# Patient Record
Sex: MALE | Race: BLACK OR AFRICAN AMERICAN | Employment: FULL TIME | URBAN - NONMETROPOLITAN AREA
[De-identification: names, ages, dates, MRNs, and addresses within clinical notes are randomized per-mention and may not be internally consistent; named-entity substitution may affect disease eponyms.]

---

## 2017-05-06 ENCOUNTER — OFFICE VISIT (OUTPATIENT)
Dept: URGENT CARE | Age: 37
End: 2017-05-06
Payer: COMMERCIAL

## 2017-05-06 VITALS
WEIGHT: 315 LBS | RESPIRATION RATE: 22 BRPM | SYSTOLIC BLOOD PRESSURE: 166 MMHG | TEMPERATURE: 98 F | DIASTOLIC BLOOD PRESSURE: 96 MMHG | OXYGEN SATURATION: 97 % | BODY MASS INDEX: 37.19 KG/M2 | HEIGHT: 77 IN | HEART RATE: 109 BPM

## 2017-05-06 DIAGNOSIS — I10 ESSENTIAL HYPERTENSION: ICD-10-CM

## 2017-05-06 DIAGNOSIS — J06.9 URI WITH COUGH AND CONGESTION: Primary | ICD-10-CM

## 2017-05-06 PROCEDURE — 99213 OFFICE O/P EST LOW 20 MIN: CPT | Performed by: PHYSICIAN ASSISTANT

## 2017-05-06 RX ORDER — BENZONATATE 100 MG/1
100 CAPSULE ORAL 3 TIMES DAILY PRN
Qty: 30 CAPSULE | Refills: 0 | Status: SHIPPED | OUTPATIENT
Start: 2017-05-06 | End: 2017-05-13

## 2017-05-06 RX ORDER — LOSARTAN POTASSIUM AND HYDROCHLOROTHIAZIDE 12.5; 5 MG/1; MG/1
1 TABLET ORAL DAILY
Qty: 30 TABLET | Refills: 0 | Status: SHIPPED | OUTPATIENT
Start: 2017-05-06 | End: 2022-10-28

## 2017-05-06 RX ORDER — AZITHROMYCIN 250 MG/1
TABLET, FILM COATED ORAL
Qty: 1 PACKET | Refills: 0 | Status: SHIPPED | OUTPATIENT
Start: 2017-05-06 | End: 2017-05-16

## 2017-05-06 ASSESSMENT — ENCOUNTER SYMPTOMS
GASTROINTESTINAL NEGATIVE: 1
WHEEZING: 0
SORE THROAT: 1
COUGH: 1
SHORTNESS OF BREATH: 0

## 2017-05-16 ENCOUNTER — OFFICE VISIT (OUTPATIENT)
Dept: FAMILY MEDICINE CLINIC | Facility: CLINIC | Age: 37
End: 2017-05-16

## 2017-05-16 VITALS
RESPIRATION RATE: 18 BRPM | OXYGEN SATURATION: 98 % | WEIGHT: 315 LBS | SYSTOLIC BLOOD PRESSURE: 142 MMHG | HEIGHT: 77 IN | DIASTOLIC BLOOD PRESSURE: 96 MMHG | BODY MASS INDEX: 37.19 KG/M2 | HEART RATE: 103 BPM | TEMPERATURE: 98.8 F

## 2017-05-16 DIAGNOSIS — I10 BENIGN ESSENTIAL HTN: Primary | ICD-10-CM

## 2017-05-16 DIAGNOSIS — E66.01 MORBID OBESITY DUE TO EXCESS CALORIES (HCC): ICD-10-CM

## 2017-05-16 DIAGNOSIS — Z13.220 SCREENING FOR LIPID DISORDERS: ICD-10-CM

## 2017-05-16 PROBLEM — E66.09 NON MORBID OBESITY DUE TO EXCESS CALORIES: Status: ACTIVE | Noted: 2017-05-16

## 2017-05-16 PROCEDURE — 99214 OFFICE O/P EST MOD 30 MIN: CPT | Performed by: FAMILY MEDICINE

## 2017-05-16 RX ORDER — LOSARTAN POTASSIUM AND HYDROCHLOROTHIAZIDE 12.5; 5 MG/1; MG/1
1.5 TABLET ORAL DAILY
Qty: 45 TABLET | Refills: 2 | Status: SHIPPED | OUTPATIENT
Start: 2017-05-16 | End: 2017-06-19

## 2017-05-16 RX ORDER — BENZONATATE 100 MG/1
CAPSULE ORAL 3 TIMES DAILY PRN
Refills: 0 | COMMUNITY
Start: 2017-05-06 | End: 2017-05-16

## 2017-05-16 RX ORDER — LOSARTAN POTASSIUM AND HYDROCHLOROTHIAZIDE 12.5; 5 MG/1; MG/1
TABLET ORAL DAILY
Refills: 0 | COMMUNITY
Start: 2017-05-06 | End: 2017-05-16 | Stop reason: SDUPTHER

## 2017-05-17 LAB
ALBUMIN SERPL-MCNC: 4 G/DL (ref 3.5–5)
ALBUMIN/GLOB SERPL: 1 G/DL (ref 1.1–2.5)
ALP SERPL-CCNC: 105 U/L (ref 24–120)
ALT SERPL-CCNC: 57 U/L (ref 0–54)
AST SERPL-CCNC: 37 U/L (ref 7–45)
BASOPHILS # BLD AUTO: 0.04 10*3/MM3 (ref 0–0.2)
BASOPHILS NFR BLD AUTO: 0.3 % (ref 0–2)
BILIRUB SERPL-MCNC: 0.6 MG/DL (ref 0.1–1)
BUN SERPL-MCNC: 10 MG/DL (ref 5–21)
BUN/CREAT SERPL: 13 (ref 7–25)
CALCIUM SERPL-MCNC: 9.7 MG/DL (ref 8.4–10.4)
CHLORIDE SERPL-SCNC: 100 MMOL/L (ref 98–110)
CHOLEST SERPL-MCNC: 175 MG/DL (ref 130–200)
CO2 SERPL-SCNC: 26 MMOL/L (ref 24–31)
CREAT SERPL-MCNC: 0.77 MG/DL (ref 0.5–1.4)
EOSINOPHIL # BLD AUTO: 0.12 10*3/MM3 (ref 0–0.7)
EOSINOPHIL NFR BLD AUTO: 1 % (ref 0–4)
ERYTHROCYTE [DISTWIDTH] IN BLOOD BY AUTOMATED COUNT: 14.9 % (ref 12–15)
GLOBULIN SER CALC-MCNC: 4.2 GM/DL
GLUCOSE SERPL-MCNC: 91 MG/DL (ref 70–100)
HCT VFR BLD AUTO: 51.2 % (ref 40–52)
HDLC SERPL-MCNC: 50 MG/DL
HGB BLD-MCNC: 16.7 G/DL (ref 14–18)
IMM GRANULOCYTES # BLD: 0.03 10*3/MM3 (ref 0–0.03)
IMM GRANULOCYTES NFR BLD: 0.3 % (ref 0–5)
LDLC SERPL CALC-MCNC: 103 MG/DL (ref 0–99)
LYMPHOCYTES # BLD AUTO: 3.07 10*3/MM3 (ref 0.72–4.86)
LYMPHOCYTES NFR BLD AUTO: 25.6 % (ref 15–45)
MCH RBC QN AUTO: 28.3 PG (ref 28–32)
MCHC RBC AUTO-ENTMCNC: 32.6 G/DL (ref 33–36)
MCV RBC AUTO: 86.8 FL (ref 82–95)
MONOCYTES # BLD AUTO: 0.83 10*3/MM3 (ref 0.19–1.3)
MONOCYTES NFR BLD AUTO: 6.9 % (ref 4–12)
NEUTROPHILS # BLD AUTO: 7.9 10*3/MM3 (ref 1.87–8.4)
NEUTROPHILS NFR BLD AUTO: 65.9 % (ref 39–78)
PLATELET # BLD AUTO: 286 10*3/MM3 (ref 130–400)
POTASSIUM SERPL-SCNC: 4.1 MMOL/L (ref 3.5–5.3)
PROT SERPL-MCNC: 8.2 G/DL (ref 6.3–8.7)
RBC # BLD AUTO: 5.9 10*6/MM3 (ref 4.8–5.9)
SODIUM SERPL-SCNC: 141 MMOL/L (ref 135–145)
TRIGL SERPL-MCNC: 111 MG/DL (ref 0–149)
VLDLC SERPL CALC-MCNC: 22.2 MG/DL
WBC # BLD AUTO: 11.99 10*3/MM3 (ref 4.8–10.8)

## 2017-06-19 ENCOUNTER — OFFICE VISIT (OUTPATIENT)
Dept: FAMILY MEDICINE CLINIC | Facility: CLINIC | Age: 37
End: 2017-06-19

## 2017-06-19 VITALS
BODY MASS INDEX: 37.19 KG/M2 | OXYGEN SATURATION: 98 % | HEIGHT: 77 IN | DIASTOLIC BLOOD PRESSURE: 96 MMHG | TEMPERATURE: 97.8 F | SYSTOLIC BLOOD PRESSURE: 146 MMHG | RESPIRATION RATE: 20 BRPM | HEART RATE: 93 BPM | WEIGHT: 315 LBS

## 2017-06-19 DIAGNOSIS — E66.01 MORBID OBESITY DUE TO EXCESS CALORIES (HCC): Primary | ICD-10-CM

## 2017-06-19 DIAGNOSIS — I10 BENIGN ESSENTIAL HTN: ICD-10-CM

## 2017-06-19 PROCEDURE — 99214 OFFICE O/P EST MOD 30 MIN: CPT | Performed by: FAMILY MEDICINE

## 2017-06-19 RX ORDER — LOSARTAN POTASSIUM AND HYDROCHLOROTHIAZIDE 25; 100 MG/1; MG/1
1 TABLET ORAL DAILY
Qty: 30 TABLET | Refills: 2 | Status: ON HOLD | OUTPATIENT
Start: 2017-06-19 | End: 2019-10-14 | Stop reason: SDUPTHER

## 2017-06-19 NOTE — PROGRESS NOTES
Chief Complaint   Patient presents with   • Follow-up     1 month f/u of HTN and starting BP meds.  He states that he has done a lot changing of his lifestyle with trying to exercise and eat better.          History:  Marlon Mccarthy is a 37 y.o. male presents who today for evaluation of the above problems.  PCP currently listed as No Known Provider.   Present with wife, Nahomi.  Permission to speak freely discussed and patient agreed.  1 month f/u HTN.  BP is mildly elevated today, about what it was in last OV.  He has no cuff at home large enough to check it.  The patient has not been checking BP. No HA, no chest pain, no vision changes, no SOA.  Meds are doing well, no SE.  He is happy with Rx, not overly expensive.  He is down 15 lb from last OV.  He has been drinking more water, trying to drink 64-70. Drinking 12 oz before meal and waiting 30 minute.  Caffeine free, zero calorie soft drinks, water with additives.  Working out/exerFamilinking.  He is subscribed to [employer] and healthy snacking.  Caloric intake has dropped from ~4000 to 2500 to 2600.  He is budgeted for 3400. He pushing himself.  Breakfast 300-500, lunch 500-800, Dinner 600-800.  He snacks about 4-5x daily.  Pouches of tuna are working well.  He has some white coat HTN.  BMI down from 53.4 to 51.6.   We talked about incresaing BP medication from 1.5 daily to 100/25 daily and his wife is concerned that it may be too much.  He hsa no home readings.  Using Lose it and doing very well.  He is working on challenges.       Marlon Mccarthy  has a past medical history of Diverticulitis of colon; Hypertension; and Non morbid obesity due to excess calories (5/16/2017).    Allergies   Allergen Reactions   • Lisinopril Cough     Past Medical History:   Diagnosis Date   • Diverticulitis of colon    • Hypertension    • Non morbid obesity due to excess calories 5/16/2017     History reviewed. No pertinent surgical history.  Family History   Problem Relation Age of  Onset   • Hypertension Mother    • Hypertension Father    • No Known Problems Sister    • No Known Problems Maternal Grandmother    • No Known Problems Maternal Grandfather    • No Known Problems Paternal Grandmother    • No Known Problems Paternal Grandfather    • No Known Problems Sister        Current Outpatient Prescriptions on File Prior to Visit   Medication Sig Dispense Refill   • losartan-hydrochlorothiazide (HYZAAR) 50-12.5 MG per tablet Take 1.5 tablets by mouth Daily. 45 tablet 2     No current facility-administered medications on file prior to visit.        Family history, surgical history, past medical history, Allergies and meds reviewed with patient today and updated in [facility] EMR.     ROS:  Review of Systems   Constitutional: Negative for activity change, appetite change, chills, diaphoresis, fatigue and fever.   HENT: Negative for congestion, ear discharge, ear pain, facial swelling, hearing loss, rhinorrhea, sinus pressure, sneezing, sore throat and tinnitus.    Eyes: Negative for pain, discharge, redness and visual disturbance.   Respiratory: Negative for apnea, cough, choking, chest tightness, shortness of breath and wheezing.    Cardiovascular: Negative for chest pain, palpitations and leg swelling.   Gastrointestinal: Negative for abdominal pain, constipation, diarrhea, nausea and vomiting.   Genitourinary: Negative for difficulty urinating, flank pain, frequency, penile pain and urgency.   Musculoskeletal: Negative for arthralgias, back pain, gait problem, joint swelling, myalgias and neck pain.   Skin: Negative for color change, pallor and rash.   Allergic/Immunologic: Negative for environmental allergies and food allergies.   Neurological: Negative for dizziness, seizures, weakness, numbness and headaches.   Hematological: Negative for adenopathy. Does not bruise/bleed easily.   Psychiatric/Behavioral: Negative for agitation, behavioral problems, confusion, hallucinations, self-injury, sleep  "disturbance and suicidal ideas.       OBJECTIVE:  Vitals:    06/19/17 0909   BP: 142/84   BP Location: Left arm   Patient Position: Sitting   Cuff Size: Large Adult   Pulse: 93   Resp: 20   Temp: 97.8 °F (36.6 °C)   TempSrc: Oral   SpO2: 98%   Weight: (!) 435 lb 6.4 oz (197 kg)   Height: 77\" (195.6 cm)     Physical Exam   Constitutional: He is oriented to person, place, and time. He appears well-developed and well-nourished.  Non-toxic appearance. He does not have a sickly appearance.   Central adiposity. Morbidly obese.    HENT:   Head: Normocephalic and atraumatic.   Right Ear: External ear normal.   Left Ear: External ear normal.   Nose: Nose normal.   Mouth/Throat: Oropharynx is clear and moist.   Eyes: Conjunctivae and EOM are normal. Pupils are equal, round, and reactive to light.   Neck: Normal range of motion. Neck supple. No thyromegaly present.   Cardiovascular: Normal rate, regular rhythm, normal heart sounds and intact distal pulses.    No murmur heard.  Pulmonary/Chest: Effort normal and breath sounds normal. No respiratory distress. He has no wheezes. He exhibits no tenderness.   Abdominal: Soft. Bowel sounds are normal. There is no tenderness. There is no rebound and no guarding.   Musculoskeletal: Normal range of motion. He exhibits no tenderness.   Lymphadenopathy:     He has no cervical adenopathy.   Neurological: He is alert and oriented to person, place, and time. No cranial nerve deficit. Coordination normal.   Skin: Skin is warm and dry. No rash noted.   Psychiatric: He has a normal mood and affect. His behavior is normal. Judgment and thought content normal.   Nursing note and vitals reviewed.      Assessment/Plan:  Morbid obesity due to excess calories: Declined referral.  He is wanting to work on this himself.  Down 14 lb from last OV and nearly back to weight from 8/30/16.  Caloric limiting, regular exercise and goals are being met.  Initial goal of 5% weight loss.  Set small goals. Federal " guidelines recommend that people under the age of 65 should have a BMI of 18.5-24.9 and people age 65 and older should have a BMI of 23-30. Morbid obesity is defined as >100 lb overweight or BMI >40.  The patient BMI is outside the recommended range and we recommended/discussed today to utilize a diet/exercise program to get back into the appropriate range.  Today we encouraged roughly a 1 lb per week weight loss with initial goal of 5% weight loss. The initial step is to document everything that is consumed into a food diary.  Studies have shown that patients can lose up to 2x the weight by keeping track of foods.  We discussed BEE today and discussed reasonable goal to realize weight loss.    · Discussed if eating out for a meal, consider cutting food in half and placing into to-go container.  Individually portion any foods coming into the home based on package.   · Consider using smaller plates.    · Consider drinking 12 oz of water 30 minutes before meal as way to suppress appetite.   · Cut back on soft drinks/juices.  Discussed 1 can of regular soft drink has roughly 150-170 Calories per day.    · Increase exercise as able. It is recommended to try to exercise minimum 10 minutes 5 days per week.  The goal over the next 2-4 weeks is to walk 30 minutes per day 5 days per week at pace difficult to hold conversation.   · Medications that may provide some benefit to weight loss include metformin, topamax, phentermine, Qsymia, Belviq (lorcaserin), Contrave (Buproprion/naltrexone) and a few others for Binge eating.  Also discussed some of the FAD diets and recommended general portion reduction instead of special dietary changes.  · Apps that may be of benefit include Ziploop Pal, Lose it.  · Healthier choices included fruits/grains/nuts instead of process food.  · Offered referral to dietician and bariatrics.      Benign essential HTN: Not at goal.  We increased him from 50/12.5 #1.5 tablets daily to 100-25mg 1  tablet daily.  BP medications increased today.  Discussed weight loss, portion contorl. Discussed medications R/B/A to meds d/w patient, SE reviewed, handout offered regarding medications listed.    NEW RX STRENGTH TODAY.  No SE so far.  He is feeling better overall.  No chest pain, no N/V/D vision change.  Bring BP log to me in 2-4 weeks. IF this remains elevated I will add norvasc.   -     losartan-hydrochlorothiazide (HYZAAR) 100-25 MG per tablet; Take 1 tablet by mouth Daily.    Risks/benefits of current and new medications discussed with the patient and or family today.  The patient/family are aware and accept that if there any side effects they should call or return to clinic as soon as possible.  Appropriate F/U discussed for topics addressed today. All questions were answered to the satisfactory state of patient/family.  Should symptoms fail to improve or worsen they agree to call or return to clinic or to go to the ER. Education handouts were offered on any new Rx if requested.  Discussed the importance of following up with any needed screening tests/labs/specialist appointments and any requested follow-up recommended by me today.  Importance of maintaining follow-up discussed and patient accepts that missed appointments can delay diagnosis and potentially lead to worsening of conditions.    An After Visit Summary was printed and given to the patient at discharge.  Follow-up: Return in about 3 months (around 9/19/2017).         Chris Denney M.D. 6/19/2017   Answers for HPI/ROS submitted by the patient on 6/16/2017   Hypertension  Chronicity: recurrent  Onset: more than 1 year ago  Progression since onset: waxing and waning  Condition status: controlled  anxiety: No  blurred vision: No  malaise/fatigue: No  orthopnea: No  peripheral edema: No  PND: No  sweats: No  Agents associated with hypertension: no associated agents  CAD risks: obesity  Compliance problems: exercise

## 2017-08-01 ENCOUNTER — HOSPITAL ENCOUNTER (OUTPATIENT)
Dept: NUTRITION | Facility: HOSPITAL | Age: 37
Discharge: HOME | End: 2017-08-01
Attending: FAMILY MEDICINE

## 2019-10-14 ENCOUNTER — APPOINTMENT (OUTPATIENT)
Dept: CARDIOLOGY | Facility: HOSPITAL | Age: 39
End: 2019-10-14

## 2019-10-14 ENCOUNTER — HOSPITAL ENCOUNTER (OUTPATIENT)
Facility: HOSPITAL | Age: 39
Setting detail: OBSERVATION
Discharge: HOME | End: 2019-10-14
Attending: EMERGENCY MEDICINE | Admitting: FAMILY MEDICINE

## 2019-10-14 VITALS
TEMPERATURE: 97.6 F | BODY MASS INDEX: 38.36 KG/M2 | SYSTOLIC BLOOD PRESSURE: 173 MMHG | WEIGHT: 315 LBS | RESPIRATION RATE: 16 BRPM | OXYGEN SATURATION: 99 % | HEART RATE: 87 BPM | HEIGHT: 76 IN | DIASTOLIC BLOOD PRESSURE: 84 MMHG

## 2019-10-14 DIAGNOSIS — I10 BENIGN ESSENTIAL HTN: ICD-10-CM

## 2019-10-14 DIAGNOSIS — F10.20 ALCOHOLISM (HCC): ICD-10-CM

## 2019-10-14 DIAGNOSIS — I10 HYPERTENSION, UNSPECIFIED TYPE: ICD-10-CM

## 2019-10-14 DIAGNOSIS — I47.1 SVT (SUPRAVENTRICULAR TACHYCARDIA) (HCC): Primary | ICD-10-CM

## 2019-10-14 PROBLEM — I47.10 SVT (SUPRAVENTRICULAR TACHYCARDIA): Status: ACTIVE | Noted: 2019-10-14

## 2019-10-14 LAB
BH CV ECHO MEAS - AO MAX PG (FULL): 0.58 MMHG
BH CV ECHO MEAS - AO MAX PG: 6.1 MMHG
BH CV ECHO MEAS - AO MEAN PG (FULL): 1 MMHG
BH CV ECHO MEAS - AO MEAN PG: 4 MMHG
BH CV ECHO MEAS - AO ROOT AREA (BSA CORRECTED): 1.1
BH CV ECHO MEAS - AO ROOT AREA: 9.6 CM^2
BH CV ECHO MEAS - AO ROOT DIAM: 3.5 CM
BH CV ECHO MEAS - AO V2 MAX: 123 CM/SEC
BH CV ECHO MEAS - AO V2 MEAN: 94.7 CM/SEC
BH CV ECHO MEAS - AO V2 VTI: 24.6 CM
BH CV ECHO MEAS - AVA(I,A): 3.3 CM^2
BH CV ECHO MEAS - AVA(I,D): 3.3 CM^2
BH CV ECHO MEAS - AVA(V,A): 3.6 CM^2
BH CV ECHO MEAS - AVA(V,D): 3.6 CM^2
BH CV ECHO MEAS - BSA(HAYCOCK): 3.6 M^2
BH CV ECHO MEAS - BSA: 3.3 M^2
BH CV ECHO MEAS - BZI_BMI: 60.6 KILOGRAMS/M^2
BH CV ECHO MEAS - BZI_METRIC_HEIGHT: 193 CM
BH CV ECHO MEAS - BZI_METRIC_WEIGHT: 225.9 KG
BH CV ECHO MEAS - EDV(CUBED): 197.1 ML
BH CV ECHO MEAS - EDV(MOD-SP4): 156 ML
BH CV ECHO MEAS - EDV(TEICH): 167.9 ML
BH CV ECHO MEAS - EF(CUBED): 83.5 %
BH CV ECHO MEAS - EF(MOD-SP4): 60.2 %
BH CV ECHO MEAS - EF(TEICH): 75.8 %
BH CV ECHO MEAS - ESV(CUBED): 32.5 ML
BH CV ECHO MEAS - ESV(MOD-SP4): 62.1 ML
BH CV ECHO MEAS - ESV(TEICH): 40.6 ML
BH CV ECHO MEAS - FS: 45.2 %
BH CV ECHO MEAS - IVS/LVPW: 1.1
BH CV ECHO MEAS - IVSD: 1.3 CM
BH CV ECHO MEAS - LA DIMENSION: 3.7 CM
BH CV ECHO MEAS - LA/AO: 1.1
BH CV ECHO MEAS - LAT PEAK E' VEL: 8.4 CM/SEC
BH CV ECHO MEAS - LV DIASTOLIC VOL/BSA (35-75): 47.8 ML/M^2
BH CV ECHO MEAS - LV MASS(C)D: 307.2 GRAMS
BH CV ECHO MEAS - LV MASS(C)DI: 94.1 GRAMS/M^2
BH CV ECHO MEAS - LV MAX PG: 5.5 MMHG
BH CV ECHO MEAS - LV MEAN PG: 3 MMHG
BH CV ECHO MEAS - LV SYSTOLIC VOL/BSA (12-30): 19 ML/M^2
BH CV ECHO MEAS - LV V1 MAX: 117 CM/SEC
BH CV ECHO MEAS - LV V1 MEAN: 79.1 CM/SEC
BH CV ECHO MEAS - LV V1 VTI: 21.1 CM
BH CV ECHO MEAS - LVIDD: 5.8 CM
BH CV ECHO MEAS - LVIDS: 3.2 CM
BH CV ECHO MEAS - LVLD AP4: 9.3 CM
BH CV ECHO MEAS - LVLS AP4: 8.5 CM
BH CV ECHO MEAS - LVOT AREA (M): 3.8 CM^2
BH CV ECHO MEAS - LVOT AREA: 3.8 CM^2
BH CV ECHO MEAS - LVOT DIAM: 2.2 CM
BH CV ECHO MEAS - LVPWD: 1.2 CM
BH CV ECHO MEAS - MED PEAK E' VEL: 9.79 CM/SEC
BH CV ECHO MEAS - MV A MAX VEL: 100 CM/SEC
BH CV ECHO MEAS - MV DEC TIME: 0.18 SEC
BH CV ECHO MEAS - MV E MAX VEL: 71.4 CM/SEC
BH CV ECHO MEAS - MV E/A: 0.71
BH CV ECHO MEAS - PA MAX PG: 2.9 MMHG
BH CV ECHO MEAS - PA V2 MAX: 85.4 CM/SEC
BH CV ECHO MEAS - RAP SYSTOLE: 5 MMHG
BH CV ECHO MEAS - RVSP: 15.6 MMHG
BH CV ECHO MEAS - SI(AO): 72.5 ML/M^2
BH CV ECHO MEAS - SI(CUBED): 50.4 ML/M^2
BH CV ECHO MEAS - SI(LVOT): 24.6 ML/M^2
BH CV ECHO MEAS - SI(MOD-SP4): 28.8 ML/M^2
BH CV ECHO MEAS - SI(TEICH): 39 ML/M^2
BH CV ECHO MEAS - SV(AO): 236.7 ML
BH CV ECHO MEAS - SV(CUBED): 164.7 ML
BH CV ECHO MEAS - SV(LVOT): 80.2 ML
BH CV ECHO MEAS - SV(MOD-SP4): 93.9 ML
BH CV ECHO MEAS - SV(TEICH): 127.2 ML
BH CV ECHO MEAS - TR MAX VEL: 163 CM/SEC
BH CV ECHO MEASUREMENTS AVERAGE E/E' RATIO: 7.85
CHOLEST SERPL-MCNC: 177 MG/DL (ref 0–200)
CK SERPL-CCNC: 434 U/L (ref 20–200)
HBA1C MFR BLD: 6.1 % (ref 4.8–5.6)
HDLC SERPL-MCNC: 51 MG/DL (ref 40–60)
HOLD SPECIMEN: NORMAL
LDLC SERPL CALC-MCNC: 105 MG/DL (ref 0–100)
LDLC/HDLC SERPL: 2.06 {RATIO}
LEFT ATRIUM VOLUME INDEX: 27.1 ML/M2
LEFT ATRIUM VOLUME: 88.4 CM3
MAGNESIUM SERPL-MCNC: 2 MG/DL (ref 1.6–2.6)
MAXIMAL PREDICTED HEART RATE: 180 BPM
PHOSPHATE SERPL-MCNC: 2.6 MG/DL (ref 2.5–4.5)
STRESS TARGET HR: 153 BPM
T4 FREE SERPL-MCNC: 1.28 NG/DL (ref 0.93–1.7)
TRIGL SERPL-MCNC: 105 MG/DL (ref 0–150)
TROPONIN T SERPL-MCNC: <0.01 NG/ML (ref 0–0.03)
TSH SERPL DL<=0.05 MIU/L-ACNC: 1.2 UIU/ML (ref 0.27–4.2)
VLDLC SERPL-MCNC: 21 MG/DL
WHOLE BLOOD HOLD SPECIMEN: NORMAL
WHOLE BLOOD HOLD SPECIMEN: NORMAL

## 2019-10-14 PROCEDURE — 84439 ASSAY OF FREE THYROXINE: CPT | Performed by: EMERGENCY MEDICINE

## 2019-10-14 PROCEDURE — G0378 HOSPITAL OBSERVATION PER HR: HCPCS

## 2019-10-14 PROCEDURE — 93306 TTE W/DOPPLER COMPLETE: CPT

## 2019-10-14 PROCEDURE — 25010000002 PERFLUTREN 6.52 MG/ML SUSPENSION: Performed by: FAMILY MEDICINE

## 2019-10-14 PROCEDURE — 0296T HC EXT ECG > 48HR TO 21 DAY RCRD W/CONECT INTL RCRD: CPT

## 2019-10-14 PROCEDURE — 93010 ELECTROCARDIOGRAM REPORT: CPT | Performed by: INTERNAL MEDICINE

## 2019-10-14 PROCEDURE — 99285 EMERGENCY DEPT VISIT HI MDM: CPT

## 2019-10-14 PROCEDURE — 84484 ASSAY OF TROPONIN QUANT: CPT | Performed by: INTERNAL MEDICINE

## 2019-10-14 PROCEDURE — 96372 THER/PROPH/DIAG INJ SC/IM: CPT

## 2019-10-14 PROCEDURE — 96374 THER/PROPH/DIAG INJ IV PUSH: CPT

## 2019-10-14 PROCEDURE — 93306 TTE W/DOPPLER COMPLETE: CPT | Performed by: INTERNAL MEDICINE

## 2019-10-14 PROCEDURE — 84100 ASSAY OF PHOSPHORUS: CPT | Performed by: EMERGENCY MEDICINE

## 2019-10-14 PROCEDURE — 93005 ELECTROCARDIOGRAM TRACING: CPT | Performed by: INTERNAL MEDICINE

## 2019-10-14 PROCEDURE — 83036 HEMOGLOBIN GLYCOSYLATED A1C: CPT | Performed by: INTERNAL MEDICINE

## 2019-10-14 PROCEDURE — 84484 ASSAY OF TROPONIN QUANT: CPT | Performed by: EMERGENCY MEDICINE

## 2019-10-14 PROCEDURE — 94799 UNLISTED PULMONARY SVC/PX: CPT

## 2019-10-14 PROCEDURE — 25010000002 ENOXAPARIN PER 10 MG: Performed by: INTERNAL MEDICINE

## 2019-10-14 PROCEDURE — 99204 OFFICE O/P NEW MOD 45 MIN: CPT | Performed by: INTERNAL MEDICINE

## 2019-10-14 PROCEDURE — 93005 ELECTROCARDIOGRAM TRACING: CPT | Performed by: EMERGENCY MEDICINE

## 2019-10-14 PROCEDURE — 80061 LIPID PANEL: CPT | Performed by: INTERNAL MEDICINE

## 2019-10-14 PROCEDURE — 82550 ASSAY OF CK (CPK): CPT | Performed by: EMERGENCY MEDICINE

## 2019-10-14 PROCEDURE — 94760 N-INVAS EAR/PLS OXIMETRY 1: CPT

## 2019-10-14 PROCEDURE — 84443 ASSAY THYROID STIM HORMONE: CPT | Performed by: EMERGENCY MEDICINE

## 2019-10-14 PROCEDURE — 83735 ASSAY OF MAGNESIUM: CPT | Performed by: EMERGENCY MEDICINE

## 2019-10-14 RX ORDER — LABETALOL HYDROCHLORIDE 5 MG/ML
10 INJECTION, SOLUTION INTRAVENOUS ONCE
Status: COMPLETED | OUTPATIENT
Start: 2019-10-14 | End: 2019-10-14

## 2019-10-14 RX ORDER — SODIUM CHLORIDE 0.9 % (FLUSH) 0.9 %
10 SYRINGE (ML) INJECTION AS NEEDED
Status: DISCONTINUED | OUTPATIENT
Start: 2019-10-14 | End: 2019-10-14 | Stop reason: HOSPADM

## 2019-10-14 RX ORDER — NITROGLYCERIN 0.4 MG/1
0.4 TABLET SUBLINGUAL
Status: DISCONTINUED | OUTPATIENT
Start: 2019-10-14 | End: 2019-10-14 | Stop reason: HOSPADM

## 2019-10-14 RX ORDER — ONDANSETRON 4 MG/1
4 TABLET, FILM COATED ORAL EVERY 6 HOURS PRN
Status: DISCONTINUED | OUTPATIENT
Start: 2019-10-14 | End: 2019-10-14 | Stop reason: HOSPADM

## 2019-10-14 RX ORDER — LOSARTAN POTASSIUM AND HYDROCHLOROTHIAZIDE 25; 100 MG/1; MG/1
1 TABLET ORAL DAILY
Qty: 30 TABLET | Refills: 3 | Status: SHIPPED | OUTPATIENT
Start: 2019-10-14 | End: 2019-10-14 | Stop reason: SDUPTHER

## 2019-10-14 RX ORDER — SODIUM CHLORIDE 0.9 % (FLUSH) 0.9 %
10 SYRINGE (ML) INJECTION EVERY 12 HOURS SCHEDULED
Status: DISCONTINUED | OUTPATIENT
Start: 2019-10-14 | End: 2019-10-14 | Stop reason: HOSPADM

## 2019-10-14 RX ORDER — SODIUM CHLORIDE 9 MG/ML
100 INJECTION, SOLUTION INTRAVENOUS CONTINUOUS
Status: DISCONTINUED | OUTPATIENT
Start: 2019-10-14 | End: 2019-10-14 | Stop reason: HOSPADM

## 2019-10-14 RX ORDER — LABETALOL HYDROCHLORIDE 5 MG/ML
20 INJECTION, SOLUTION INTRAVENOUS EVERY 4 HOURS PRN
Status: DISCONTINUED | OUTPATIENT
Start: 2019-10-14 | End: 2019-10-14 | Stop reason: HOSPADM

## 2019-10-14 RX ORDER — ONDANSETRON 2 MG/ML
4 INJECTION INTRAMUSCULAR; INTRAVENOUS EVERY 6 HOURS PRN
Status: DISCONTINUED | OUTPATIENT
Start: 2019-10-14 | End: 2019-10-14 | Stop reason: HOSPADM

## 2019-10-14 RX ADMIN — SODIUM CHLORIDE, PRESERVATIVE FREE 10 ML: 5 INJECTION INTRAVENOUS at 10:32

## 2019-10-14 RX ADMIN — LOSARTAN POTASSIUM: 50 TABLET, FILM COATED ORAL at 11:06

## 2019-10-14 RX ADMIN — ENOXAPARIN SODIUM 40 MG: 40 INJECTION SUBCUTANEOUS at 10:32

## 2019-10-14 RX ADMIN — LABETALOL HYDROCHLORIDE 10 MG: 5 INJECTION INTRAVENOUS at 03:53

## 2019-10-14 RX ADMIN — PERFLUTREN 9.78 MG: 6.52 INJECTION, SUSPENSION INTRAVENOUS at 09:20

## 2019-10-14 NOTE — CONSULTS
Inpatient Cardiology Consult  Consult performed by: Teddy Hector MD  Consult ordered by: Dejan Rodgers MD  Reason for consult: SVT        Chief Complaint   Patient presents with   • Rapid Heart Rate     HPI: This is a 39-year-old -American male with a history of hypertension, presenting with elevated heart rate.  Patient says that he was at work last night when he developed acute onset of heart rate elevation.  He noticed that his heart rate was elevated and he got up to try to go outside.  His supervisors at work noticed that he was not feeling well therefore recommended that he seek medical care.  The patient says that he has had numerous episodes like this in the past.  These last anywhere from minutes to about a half an hour in duration and generally spontaneously resolve or sometimes resolve after coughing or Valsalva.  He tried these maneuvers last night however this did not resolve his symptoms.  Ultimately, he was cardioverted to restore sinus rhythm.  The patient denies any chest pain with the episode, no lightheadedness, dizziness or syncope.  No significant shortness of breath with the episode.  No other symptoms were associated with this event.  He was transferred here and monitored overnight.  No obvious recurrence of this rhythm overnight.  The patient had 2 ECGs, both showing sinus rhythm.  The first ECG did show some ventricular ectopic beats.  The patient notes that he has been evaluated for episodes like this in the past.  He is one Holter monitors in the past which were unrevealing.  In addition, he has had several ECGs in the past as well.  As stated, usually his symptoms resolve spontaneously or with deep breathing or Valsalva.  He has never required a cardioversion until last night's episode.  However, he also notes that he was not unstable at that time or significantly symptomatic.  In any event, he is back at baseline at this time.    Palpitations    This is a recurrent  Pt read message on portal.   problem. The current episode started yesterday. The problem has been resolved. The symptoms are aggravated by alcohol and caffeine. Pertinent negatives include no chest pain, coughing, dizziness, fever, nausea, numbness, shortness of breath, syncope or vomiting. He has tried breathing exercises for the symptoms. The treatment provided no relief. Risk factors include obesity and sedentary lifestyle. There is no history of anemia, drug use or hyperthyroidism.       Past Medical History:   Diagnosis Date   • Diverticulitis of colon    • Hypertension    • Non morbid obesity due to excess calories 5/16/2017   • SVT (supraventricular tachycardia) (CMS/HCC)      History reviewed. No pertinent surgical history.    Home Medications: Hyzaar 100/25 mg daily    Allergies   Allergen Reactions   • Lisinopril Cough     Social History     Tobacco Use   • Smoking status: Never Smoker   • Smokeless tobacco: Never Used   Substance Use Topics   • Alcohol use: Yes     Alcohol/week: 4.2 oz     Types: 7 Cans of beer per week     Family History   Problem Relation Age of Onset   • Hypertension Mother    • Hypertension Father    • No Known Problems Sister    • No Known Problems Maternal Grandmother    • No Known Problems Maternal Grandfather    • No Known Problems Paternal Grandmother    • No Known Problems Paternal Grandfather    • No Known Problems Sister      Review of Systems   Constitution: Negative for chills, fever, night sweats and weight loss.   HENT: Negative for congestion and hearing loss.    Eyes: Negative for blurred vision and pain.   Cardiovascular: Positive for palpitations. Negative for chest pain, claudication, dyspnea on exertion, leg swelling, orthopnea, paroxysmal nocturnal dyspnea and syncope.   Respiratory: Negative for cough, hemoptysis, shortness of breath and wheezing.    Endocrine: Negative for cold intolerance, heat intolerance, polydipsia and polyuria.   Hematologic/Lymphatic: Negative for adenopathy and  "bleeding problem. Does not bruise/bleed easily.   Skin: Negative for color change, poor wound healing and rash.   Musculoskeletal: Negative for arthritis, back pain, joint pain, joint swelling, myalgias and neck pain.   Gastrointestinal: Negative for abdominal pain, change in bowel habit, constipation, diarrhea, heartburn, hematochezia, melena, nausea and vomiting.   Genitourinary: Negative for dysuria, frequency, hematuria and nocturia.   Neurological: Negative for dizziness, focal weakness, headaches, light-headedness, loss of balance and numbness.   Psychiatric/Behavioral: Negative for altered mental status, memory loss and substance abuse.   Allergic/Immunologic: Negative for hives and persistent infections.     Physical Exam:  /89 (BP Location: Left arm, Patient Position: Lying) Comment: reported to nurse   Pulse 93   Temp 97.7 °F (36.5 °C) (Oral)   Resp 16   Ht 193 cm (75.98\")   Wt (!) 226 kg (498 lb 5 oz)   SpO2 99%   BMI 60.68 kg/m²   Temp:  [97.7 °F (36.5 °C)-98.4 °F (36.9 °C)] 97.7 °F (36.5 °C)  Heart Rate:  [] 93  Resp:  [14-16] 16  BP: (138-180)/(78-96) 163/89    Physical Exam   Constitutional: He is oriented to person, place, and time. Vital signs are normal. He appears well-developed and well-nourished. He is cooperative.  Non-toxic appearance. No distress.   HENT:   Head: Normocephalic and atraumatic.   Right Ear: External ear normal.   Left Ear: External ear normal.   Nose: Nose normal.   Mouth/Throat: Uvula is midline, oropharynx is clear and moist and mucous membranes are normal. Mucous membranes are not pale, not dry and not cyanotic. No oropharyngeal exudate.   Eyes: EOM and lids are normal. Pupils are equal, round, and reactive to light.   Neck: Normal range of motion. Neck supple. No hepatojugular reflux and no JVD present. Carotid bruit is not present. No tracheal deviation and no edema present. No thyroid mass and no thyromegaly present.   Cardiovascular: Normal rate, " regular rhythm, S1 normal, S2 normal, normal heart sounds, intact distal pulses and normal pulses.  No extrasystoles are present. PMI is not displaced. Exam reveals no gallop and no friction rub.   No murmur heard.  Pulses:       Radial pulses are 2+ on the right side, and 2+ on the left side.        Femoral pulses are 2+ on the right side, and 2+ on the left side.       Dorsalis pedis pulses are 2+ on the right side, and 2+ on the left side.        Posterior tibial pulses are 2+ on the right side, and 2+ on the left side.   Distant   Pulmonary/Chest: Effort normal. No accessory muscle usage. No respiratory distress. He has decreased breath sounds. He has no wheezes. He has no rales. He exhibits no tenderness.   Abdominal: Soft. Normal appearance and bowel sounds are normal. He exhibits no distension, no abdominal bruit and no pulsatile midline mass. There is no hepatosplenomegaly. There is no tenderness.   Obese   Musculoskeletal: Normal range of motion. He exhibits no edema, tenderness or deformity.   Lymphadenopathy:     He has no cervical adenopathy.   Neurological: He is oriented to person, place, and time. He has normal strength. No cranial nerve deficit.   Skin: Skin is warm, dry and intact. No rash noted. He is not diaphoretic. No cyanosis or erythema. Nails show no clubbing.   Psychiatric: He has a normal mood and affect. His speech is normal and behavior is normal. Thought content normal.   Vitals reviewed.    Diagnostic Data:    Lab Results   Component Value Date    TSH 1.200 10/14/2019     ECG: Sinus tachycardia with PVC  ECG: NSR  ECG at [place]: Sinus tachycardia    ASSESSMENT/PLAN:    1. PSVT  2. Essential hypertension  3. Morbid obesity    -We are asked to see this patient regarding what appears to be paroxysmal SVT.  He is in sinus rhythm at this time.  Unfortunately, I cannot obtain any rhythm strips, however by history, it seems that the patient likely does have paroxysmal SVT.  It is reasonable to  place him in a 14-day cardiac monitor at discharge to evaluate for any recurrent arrhythmias.  We did discuss starting medications for this.  Patient is not inclined to do this at this time.  His symptoms seem to occur sporadically and not frequently, therefore I think this is reasonable.  We did discuss that if his events occur in increasing frequency in the future, we can start AV vane blocking agents or antiarrhythmic agents.  In addition, EP study and possible ablation were discussed with the patient as well.  Once again, we will defer these for the time being symptoms are generally fairly rare.  -An echocardiogram is pending at this time.  We will follow-up the results of this, however this would not necessarily change the management at this time.  -The patient's blood pressure is elevated at this time.  Apparently, he had stopped his medications at some point in the past.  He was previously on Hyzaar.  Resume this medication. The patient will need to follow-up with PCP long-term regarding his blood pressure.  -Would recommend the patient follow-up in our office in 3 months to reevaluate his symptoms at that time.  We will also follow-up the results of his cardiac monitor as an outpatient.  -The patient is stable for discharge home today.

## 2019-10-14 NOTE — PLAN OF CARE
Problem: Patient Care Overview  Goal: Plan of Care Review  Outcome: Ongoing (interventions implemented as appropriate)   10/14/19 0642   Coping/Psychosocial   Plan of Care Reviewed With patient   Plan of Care Review   Progress improving   OTHER   Outcome Summary VSS improving, no SVT, no c/o pain, S-ST continue to monitor       Problem: Arrhythmia/Dysrhythmia (Symptomatic) (Adult)  Goal: Signs and Symptoms of Listed Potential Problems Will be Absent, Minimized or Managed (Arrhythmia/Dysrhythmia)  Outcome: Ongoing (interventions implemented as appropriate)

## 2019-10-14 NOTE — ED PROVIDER NOTES
"Subjective   This is an extremely pleasant albeit very nervous 39 year old morbidly obese young man who presents from OSH for evaluation of SVT. The patient shares with me that he has had episodes of palpations pretty frequently over the last few years with normal event monitor around 3 years ago. 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 He admits further to etoh usage (he will drink 4 out of 7 days a week drinking he feels around 4 each time sometimes Stout beer other times 4 Locos). He admits further to pretty heavy caffeine usage (he will sometimes down a full 2 L of soda in one sitting). He tells me the episodes will often happen when he is under a great deal of stress, he will feel his heart race and his ears will \"feel funny.' He notes this will last for around 1 hour and then self taras. He states today similar symptoms started again and 911 was called. He was cardioverted in the ambulance. OSH stated upon arrival to their Ed he was sinus tach but cardizem bolus and gtt were started as the OSH physician wished to \"prevent the SVT from happening again.\"     Of note, the patient endorses he was at one point on BP medications and anxiety medications but has stopped both. He denies any CP or SOB and states he feels much better at this time.         Family, social and past history reviewed as below, prior documentation of H and Ps and other documentation are reviewed:    Past Medical History:  No date: Diverticulitis of colon  No date: Hypertension  5/16/2017: Non morbid obesity due to excess calories    History reviewed. No pertinent surgical history.    Social History    Socioeconomic History      Marital status:       Spouse name: Not on file      Number of children: Not on file      Years of education: Not on file      Highest education level: Not on file    Tobacco Use      Smoking status: Never Smoker      Smokeless tobacco: Never Used    " Substance and Sexual Activity      Alcohol use: Yes        Alcohol/week: 4.2 oz        Types: 7 Cans of beer per week      Drug use: No      Family history: reviewed and noncontributory             Review of Systems   All other systems reviewed and are negative.      Past Medical History:   Diagnosis Date   • Diverticulitis of colon    • Hypertension    • Non morbid obesity due to excess calories 5/16/2017       Allergies   Allergen Reactions   • Lisinopril Cough       History reviewed. No pertinent surgical history.    Family History   Problem Relation Age of Onset   • Hypertension Mother    • Hypertension Father    • No Known Problems Sister    • No Known Problems Maternal Grandmother    • No Known Problems Maternal Grandfather    • No Known Problems Paternal Grandmother    • No Known Problems Paternal Grandfather    • No Known Problems Sister        Social History     Socioeconomic History   • Marital status:      Spouse name: Not on file   • Number of children: Not on file   • Years of education: Not on file   • Highest education level: Not on file   Tobacco Use   • Smoking status: Never Smoker   • Smokeless tobacco: Never Used   Substance and Sexual Activity   • Alcohol use: Yes     Alcohol/week: 4.2 oz     Types: 7 Cans of beer per week   • Drug use: No           Objective   Physical Exam   Constitutional: He is oriented to person, place, and time. He appears well-developed and well-nourished.   HENT:   Head: Normocephalic and atraumatic.   Eyes: Conjunctivae and EOM are normal. Pupils are equal, round, and reactive to light.   Neck: Normal range of motion. Neck supple.   Cardiovascular: Normal rate, regular rhythm, normal heart sounds and intact distal pulses.   Pulmonary/Chest: Effort normal and breath sounds normal.   Abdominal: Soft. Bowel sounds are normal.   Musculoskeletal: He exhibits no edema.   Neurological: He is alert and oriented to person, place, and time.   Skin: Skin is warm. Capillary  refill takes less than 2 seconds.   Psychiatric: He has a normal mood and affect. His behavior is normal.   Vitals reviewed.      ECG 12 Lead    Date/Time: 10/14/2019 3:44 AM  Performed by: Booker Sung MD  Authorized by: Booker Sung MD   Interpreted by physician  Rhythm: sinus tachycardia  Rate: tachycardic  BPM: 104  QRS axis: normal                   ED Course  Upon receiving report from the OSH I did ask that they stop the cardizem drip as there was no indication for the patient to be on an IV drip of a CCB for a sinus tachycardia. The risk of bradycardia or hypotension were greater than the prophylactic usage of the medication.       Labs Reviewed   CK - Abnormal; Notable for the following components:       Result Value    Creatine Kinase 434 (*)     All other components within normal limits   TROPONIN (IN-HOUSE) - Normal    Narrative:     Troponin T Reference Range:  <= 0.03 ng/mL-   Negative for AMI  >0.03 ng/mL-     Abnormal for myocardial necrosis.  Clinicians would have to utilize clinical acumen, EKG, Troponin and serial changes to determine if it is an Acute Myocardial Infarction or myocardial injury due to an underlying chronic condition.    MAGNESIUM - Normal   PHOSPHORUS - Normal   TSH - Normal   T4, FREE - Normal   RAINBOW DRAW    Narrative:     The following orders were created for panel order [place] Draw.  Procedure                               Abnormality         Status                     ---------                               -----------         ------                     Light Blue Top[[id number]]                                   In process                 Green Top (Gel)[[id number]]                                  In process                 Lavender Top[[id number]]                                     In process                 Red Top[[id number]]                                          In process                   Please view results for these tests on the individual  orders.   URINE DRUG SCREEN   LIGHT BLUE TOP   GREEN TOP   LAVENDER TOP   RED TOP       ED Course as of Oct 14 0436   Mon Oct 14, 2019   0420 Dr. Davis reccomends admission for echo given the patient did need electric cardioversion.   [JH]      ED Course User Index  [JH] Booker Sung MD                  [facility]    Final diagnoses:   SVT (supraventricular tachycardia) (CMS/HCC)   Alcoholism (CMS/HCC)   Hypertension, unspecified type              Booker Sung MD  10/14/19 0436       Booker Sung MD  10/14/19 0437

## 2019-10-14 NOTE — PROGRESS NOTES
[facility] Medicine Services  DISCHARGE SUMMARY       Date of Admission: 10/14/2019  Date of Discharge:  10/14/2019  Primary Care Physician: Provider, No Known    Presenting Problem/History of Present Illness:  SVT (supraventricular tachycardia) (CMS/HCC) [I47.1]     Final Discharge Diagnoses:  Active Hospital Problems    Diagnosis   • SVT (supraventricular tachycardia) (CMS/HCC)   • Morbid obesity due to excess calories (CMS/HCC)   • Benign essential HTN       Consults: Cardiology    Pertinent Test Results:   Interpretation Summary echocardiogram    · Left ventricular systolic function is normal. Estimated EF appears to be in the range of 56 - 60%.  · Left ventricular diastolic dysfunction (grade I) consistent with impaired relaxation.  · Left ventricular wall thickness is consistent with mild concentric hypertrophy.  · No hemodynamically significant valvular abnormalities identified on the study.     Chief Complaint on Day of Discharge: none    History of Present Illness on Day of Discharge:   Patient is a 39-year-old -American male past medical history of recurrent SVT.  He presents tonight with another episode of rapid heart rate.  Apparently he was at work this evening when he began to notice that his heart was racing and became short of breath diaphoretic.  EMS was contacted not because the patient wanted them but because he was at work essentially.  He says he normally waits out these episodes and they go away within 30 minutes.  He was found to be in SVT around 200 we do not have a rhythm strip they did not send it with him.  He was given adenosine 6 6 and 12 without improvement.  He was cardioverted with 60 J back into sinus rhythm.  He presented to an outlying emergency room where he was evaluated labs were unremarkable EKG was unremarkable patient was transferred here for higher level of care.  He arrives in no acute distress.     Patient states that he has had  these episodes for years he has had Holter monitors done without finding anything.  His EKGs have never found anything either.  This is the first time he had to be cardioverted though.  He states that usually these happen when he is anxious or under stress when she was tonight.  He states that he usually is able to ride these episodes out by practicing deep breathing and trying to relax.  He states that usually and within 30 minutes however tonight he was unable to do that because he was at work.  He otherwise has no significant past medical history except for hypertension for which she stopped his medication several years ago because he lost weight and did not need it but when he gained it back he did not resume it.  He states is not to see the doctor unless he absolutely has to.  His other major problem is morbid obesity he is approximately 470 pounds.  Otherwise he denies any dyspnea on exertion beyond what is normal for him no edema no chest pain no palpitations other than the episodes he is described.  He is never had a stress test he has never had an EP study either.  He states he is never seen a cardiologist for this either.       Hospital Course:  The patient is a 39 y.o. male who presented to [facility] with SVT/hypertension/gross morbid obesity.      SVT/hypertension.  Patient was given adenosine without any results.  Patient was cardiovert with 60 J in ER.  Cardiology was consulted.  Patient started onlabetalol as needed.  Patient needs ZIO Patch upon discharge.  Patient start back on Hyzaar.  Troponin x3 is negative.  Patient is currently in sinus rhythm.  Echocardiogram- ejection fraction 56%, diastolic dysfunction grade 1, mild concentric hypertrophy.  Follow with cardiology in 3 months.    Gross morbid obesity.  BMI 60.  Diet and exercise is been discussed the patient.    Vital signs stable, afebrile.  Plan to discharge patient home today.  Follow with PCP 1 week.  Follow with cardiology  "in 3 months.  Patient will go home with ZIO Patch.    Condition on Discharge: Stable    Physical Exam on Discharge:  /84 (BP Location: Left arm, Patient Position: Sitting) Comment: reported to nurse  Pulse 87   Temp 97.6 °F (36.4 °C) (Oral)   Resp 16   Ht 193 cm (75.98\")   Wt (!) 226 kg (498 lb 5 oz)   SpO2 99%   BMI 60.68 kg/m²   Physical Exam   Constitutional: He is oriented to person, place, and time. He appears well-developed and well-nourished.   HENT:   Head: Normocephalic and atraumatic.   Eyes: Conjunctivae and EOM are normal. Pupils are equal, round, and reactive to light.   Neck: Neck supple. No JVD present. No thyromegaly present.   Cardiovascular: Normal rate, regular rhythm, normal heart sounds and intact distal pulses. Exam reveals no gallop and no friction rub.   No murmur heard.  Pulmonary/Chest: Effort normal and breath sounds normal. No respiratory distress. He has no wheezes. He has no rales. He exhibits no tenderness.   Abdominal: Soft. Bowel sounds are normal. He exhibits no distension. There is no tenderness. There is no rebound and no guarding.   Gross morbid obesity.   Musculoskeletal: Normal range of motion. He exhibits no edema, tenderness or deformity.   Lymphadenopathy:     He has no cervical adenopathy.   Neurological: He is alert and oriented to person, place, and time. He displays normal reflexes. No cranial nerve deficit. He exhibits normal muscle tone.   Skin: Skin is warm and dry. No rash noted.   Psychiatric: He has a normal mood and affect. His behavior is normal. Judgment and thought content normal.   Nursing note and vitals reviewed.        Discharge Disposition:  Home or Self Care    Discharge Medications:     Discharge Medications      Continue These Medications      Instructions Start Date   losartan-hydrochlorothiazide 100-25 MG per tablet  Commonly known as:  HYZAAR   1 tablet, Oral, Daily             Discharge Diet:   Diet Instructions     Advance Diet As " Tolerated            Activity at Discharge:   Activity Instructions     Activity as Tolerated            Discharge Care Plan/Instructions:     Follow-up Appointments:   Future Appointments   Date Time Provider Department Center   1/14/2020  2:45 PM Teddy Hector MD [facility]   Follow up with PCP 1 week.  Cardiology in 3 months.    Evin Puri MD  10/14/19  2:31 PM    Time: Greater than 30 minutes.

## 2019-10-14 NOTE — H&P
[facility] Medicine Services  HISTORY AND PHYSICAL    Date of Admission: 10/14/2019  Primary Care Physician: Provider, No Known    Subjective     Chief Complaint: Heart racing    History of Present Illness  Patient is a 39-year-old -American male past medical history of recurrent SVT.  He presents tonight with another episode of rapid heart rate.  Apparently he was at work this evening when he began to notice that his heart was racing and became short of breath diaphoretic.  EMS was contacted not because the patient wanted them but because he was at work essentially.  He says he normally waits out these episodes and they go away within 30 minutes.  He was found to be in SVT around 200 we do not have a rhythm strip they did not send it with him.  He was given adenosine 6 6 and 12 without improvement.  He was cardioverted with 60 J back into sinus rhythm.  He presented to an [facility] emergency room where he was evaluated labs were unremarkable EKG was unremarkable patient was transferred here for higher level of care.  He arrives in no acute distress.    Patient states that he has had these episodes for years he has had Holter monitors done without finding anything.  His EKGs have never found anything either.  This is the first time he had to be cardioverted though.  He states that usually these happen when he is anxious or under stress when she was tonight.  He states that he usually is able to ride these episodes out by practicing deep breathing and trying to relax.  He states that usually and within 30 minutes however tonight he was unable to do that because he was at work.  He otherwise has no significant past medical history except for hypertension for which she stopped his medication several years ago because he lost weight and did not need it but when he gained it back he did not resume it.  He states is not to see the doctor unless he absolutely has to.  His other  "major problem is morbid obesity he is approximately 470 pounds.  Otherwise he denies any dyspnea on exertion beyond what is normal for him no edema no chest pain no palpitations other than the episodes he is described.  He is never had a stress test he has never had an EP study either.  He states he is never seen a cardiologist for this either.        Review of Systems   14 point review of systems negative except for as per HPI    Otherwise complete ROS reviewed and negative except as mentioned in the HPI.    Past Medical History:   Past Medical History:   Diagnosis Date   • Diverticulitis of colon    • Hypertension    • Non morbid obesity due to excess calories 5/16/2017     Past Surgical History:History reviewed. No pertinent surgical history.  Social History:  reports that he has never smoked. He has never used smokeless tobacco. He reports that he drinks about 4.2 oz of alcohol per week. He reports that he does not use drugs.    Family History: family history includes Hypertension in his father and mother; No Known Problems in his maternal grandfather, maternal grandmother, paternal grandfather, paternal grandmother, sister, and sister.       Allergies:  Allergies   Allergen Reactions   • Lisinopril Cough     Medications:  Prior to Admission medications    Medication Sig Start Date End Date Taking? Authorizing Provider   losartan-hydrochlorothiazide (HYZAAR) 100-25 MG per tablet Take 1 tablet by mouth Daily. 6/19/17   Chris Denney MD     Objective     Vital Signs: /91 (BP Location: Left arm, Patient Position: Lying)   Pulse 86   Temp 98 °F (36.7 °C) (Oral)   Resp 15   Ht 193 cm (75.98\")   Wt (!) 226 kg (498 lb 5 oz)   SpO2 97%   BMI 60.68 kg/m²   Physical Exam  Gen.: Morbidly obese -American male in no acute distress  HEENT: Atraumatic, normocephalic.  Pupils equal, round, and reactive to light. Extraocular movements intact.  Sclerae anicteric.  TMs negative.  Mucous membranes moist.  " Neck is supple without lymphadenopathy.  No JVD is noted.  No carotid bruits are auscultated.  Oropharynx is without erythema or exudate.   Chest: Clear to auscultation and percussion.  Gynecomastia bilaterally with a small area under his left breast that is draining  CV: Tacky regular rate and rhythm.  Normal S1-S2.  No gallops, murmurs. or rubs.  Abdomen: Soft, nontender, nondistended.  Active bowel sounds,  No hepatosplenomegaly.  No masses.  No hernias.  Extremities: No clubbing, edema, or cyanosis.  Capillary refill is normal.  Pulses are 2+ and symmetric at radial and dorsalis pedis.  Posterior tibial pulses are intact and 2+ palpable.  Neuro: Patient is awake, alert, and oriented ×3.  Cranial nerves II through XII are grossly intact.  Motor is 5 out of 5 bilaterally.  DTRs are 2+ and symmetric bilaterally. Sensory exam is nonfocal  Skin: Warm, dry, and intact.  No evidence of breakdown.  Sensorium: Normal thought and affect    Nursing notes and vital signs reviewed        Results Reviewed:  Lab Results (last 24 hours)     Procedure Component Value Units Date/Time    [place] Draw [[id number]] Collected:  10/14/19 0350    Specimen:  Blood Updated:  10/14/19 0500    Narrative:       The following orders were created for panel order [place] Draw.  Procedure                               Abnormality         Status                     ---------                               -----------         ------                     Light Blue Top[[id number]]                                   Final result               Green Top (Gel)[[id number]]                                  Final result               Lavender Top[[id number]]                                     Final result               Red Top[[id number]]                                          In process                   Please view results for these tests on the individual orders.    Light Blue Top [[id number]] Collected:  10/14/19 0350    Specimen:  Blood Updated:   10/14/19 0500     Extra Tube hold for add-on     Comment: Auto resulted       Green Top (Gel) [[id number]] Collected:  10/14/19 0350    Specimen:  Blood Updated:  10/14/19 0500     Extra Tube Hold for add-ons.     Comment: Auto resulted.       Lavender Top [[id number]] Collected:  10/14/19 0350    Specimen:  Blood Updated:  10/14/19 0500     Extra Tube hold for add-on     Comment: Auto resulted       Troponin [[id number]]  (Normal) Collected:  10/14/19 0350    Specimen:  Blood Updated:  10/14/19 0426     Troponin T <0.010 ng/mL     Narrative:       Troponin T Reference Range:  <= 0.03 ng/mL-   Negative for AMI  >0.03 ng/mL-     Abnormal for myocardial necrosis.  Clinicians would have to utilize clinical acumen, EKG, Troponin and serial changes to determine if it is an Acute Myocardial Infarction or myocardial injury due to an underlying chronic condition.     CK [[id number]]  (Abnormal) Collected:  10/14/19 0350    Specimen:  Blood Updated:  10/14/19 0426     Creatine Kinase 434 U/L     Magnesium [[id number]]  (Normal) Collected:  10/14/19 0350    Specimen:  Blood Updated:  10/14/19 0426     Magnesium 2.0 mg/dL     Phosphorus [[id number]]  (Normal) Collected:  10/14/19 0350    Specimen:  Blood Updated:  10/14/19 0426     Phosphorus 2.6 mg/dL     TSH [[id number]]  (Normal) Collected:  10/14/19 0350    Specimen:  Blood Updated:  10/14/19 0426     TSH 1.200 uIU/mL     T4, Free [[id number]]  (Normal) Collected:  10/14/19 0350    Specimen:  Blood Updated:  10/14/19 0426     Free T4 1.28 ng/dL     Red Top [[id number]] Collected:  10/14/19 0350    Specimen:  Blood Updated:  10/14/19 0356        Imaging Results (last 24 hours)     ** No results found for the last 24 hours. **        I have personally reviewed and interpreted the radiology studies and ECG obtained at time of admission.     Assessment / Plan     Assessment:   Active Hospital Problems    Diagnosis   • SVT (supraventricular tachycardia) (CMS/HCC)   • Morbid obesity due  to excess calories (CMS/[facility])   • Benign essential HTN   1.  SVT plan is to admit patient monitor on telemetry trend troponins serial EKGs.  Will check 2D echo.  Will consult cardiology for further recommendations.  Will give PRN labetalol for elevated heart rate or hypertension.  Patient probably needs an EP study at some point although he is very hesitant about doing that.  2.  Hypertension untreated defer medications to cardiology and primary team.  Patient might benefit from beta-blocker calcium channel blocker defer that as well.  3.  Morbid obesity patient needs to obviously lose weight he is encouraged to work on doing this.  He also probably needs to be screened for sleep apnea due to this arrhythmia issue and his obesity.  Patient denies for previous history of sleep apnea.    Patient seen after midnight         Code Status: Full     I discussed the patient's findings and my recommendations with the patient.  He designates his wife is his surrogate decision-maker should he not build speak for himself.    Estimated length of stay 1 to 2 days    Dejan Rodgers MD   10/14/19   6:11 AM

## 2019-11-14 ENCOUNTER — TELEPHONE (OUTPATIENT)
Dept: CARDIOLOGY | Facility: CLINIC | Age: 39
End: 2019-11-14

## 2020-03-29 ENCOUNTER — NURSE TRIAGE (OUTPATIENT)
Dept: CALL CENTER | Facility: HOSPITAL | Age: 40
End: 2020-03-29

## 2020-03-29 NOTE — TELEPHONE ENCOUNTER
"    Reason for Disposition  • [1] MODERATE dizziness (e.g., interferes with normal activities) AND [2] has NOT been evaluated by physician for this  (Exception: dizziness caused by heat exposure, sudden standing, or poor fluid intake)    Additional Information  • Negative: Severe difficulty breathing (e.g., struggling for each breath, speaks in single words)  • Negative: [1] Difficulty breathing or swallowing AND [2] started suddenly after medicine, an allergic food or bee sting  • Negative: Shock suspected (e.g., cold/pale/clammy skin, too weak to stand, low BP, rapid pulse)  • Negative: Difficult to awaken or acting confused (e.g., disoriented, slurred speech)  • Negative: [1] Weakness (i.e., paralysis, loss of muscle strength) of the face, arm or leg on one side of the body AND [2] sudden onset AND [3] present now  • Negative: [1] Numbness (i.e., loss of sensation) of the face, arm or leg on one side of the body AND [2] sudden onset AND [3] present now  • Negative: [1] Loss of speech or garbled speech AND [2] sudden onset AND [3] present now  • Negative: Overdose (accidental or intentional) of medications  • Negative: [1] Fainted > 15 minutes ago AND [2] still feels too weak or dizzy to stand  • Negative: Heart beating < 50 beats per minute OR > 140 beats per minute  • Negative: Sounds like a life-threatening emergency to the triager  • Negative: Chest pain  • Negative: Rectal bleeding, bloody stool, or tarry-black stool  • Negative: [1] Vomiting AND [2] contains red blood or black (\"coffee ground\") material  • Negative: Vomiting is main symptom  • Negative: Diarrhea is main symptom  • Negative: Headache is main symptom  • Negative: Patient states that he/she is having an anxiety/panic attack  • Negative: Dizziness from low blood sugar (i.e., < 60 mg/dl or 3.5 mmol/l)  • Negative: Dizziness is described as a spinning sensation (i.e., vertigo)  • Negative: Heat exhaustion suspected (i.e., dehydration from heat " "exposure)  • Negative: Difficulty breathing  • Negative: SEVERE dizziness (e.g., unable to stand, requires support to walk, feels like passing out now)  • Negative: Extra heart beats OR irregular heart beating  (i.e., \"palpitations\")  • Negative: [1] Drinking very little AND [2] dehydration suspected (e.g., no urine > 12 hours, very dry mouth, very lightheaded)  • Negative: Patient sounds very sick or weak to the triager  • Negative: [1] Dizziness caused by heat exposure, sudden standing, or poor fluid intake AND [2] no improvement after 2 hours of rest and fluids  • Negative: [1] Fever > 103 F (39.4 C) AND [2] not able to get the fever down using Fever Care Advice  • Negative: [1] Fever > 101 F (38.3 C) AND [2] age > 60  • Negative: [1] Fever > 100.0 F (37.8 C) AND [2] bedridden (e.g., nursing home patient, CVA, chronic illness, recovering from surgery)  • Negative: [1] Fever > 100.0 F (37.8 C) AND [2] diabetes mellitus or weak immune system (e.g., HIV positive, cancer chemo, splenectomy, organ transplant, chronic steroids)  • Negative: Fever present > 3 days (72 hours)  • Negative: Taking a medicine that could cause dizziness (e.g., blood pressure medications, diuretics)  • Negative: [1] MODERATE dizziness (e.g., interferes with normal activities) AND [2] has been evaluated by physician for this    Answer Assessment - Initial Assessment Questions  1. DESCRIPTION: \"Describe your dizziness.\"      When he is looking at an object, it feels as if he is having trouble focusing as though the object wants to vee  2. LIGHTHEADED: \"Do you feel lightheaded?\" (e.g., somewhat faint, woozy, weak upon standing)      woozy  3. VERTIGO: \"Do you feel like either you or the room is spinning or tilting?\" (i.e. vertigo)      Feels like he is moving if his eyes are closed.  4. SEVERITY: \"How bad is it?\"  \"Do you feel like you are going to faint?\" \"Can you stand and walk?\"    - MILD - walking normally    - MODERATE - interferes with " "normal activities (e.g., work, school)     - SEVERE - unable to stand, requires support to walk, feels like passing out now.       moderate  5. ONSET:  \"When did the dizziness begin?\"      Noon yesterday  6. AGGRAVATING FACTORS: \"Does anything make it worse?\" (e.g., standing, change in head position)      Bending over  7. HEART RATE: \"Can you tell me your heart rate?\" \"How many beats in 15 seconds?\"  (Note: not all patients can do this)        68  8. CAUSE: \"What do you think is causing the dizziness?\"      unknown  9. RECURRENT SYMPTOM: \"Have you had dizziness before?\" If so, ask: \"When was the last time?\" \"What happened that time?\"      Yes; last time it was accompanied by nausea and vomiting  10. OTHER SYMPTOMS: \"Do you have any other symptoms?\" (e.g., fever, chest pain, vomiting, diarrhea, bleeding)        Stuffy nose  11. PREGNANCY: \"Is there any chance you are pregnant?\" \"When was your last menstrual period?\"        na    Protocols used: DIZZINESS - LIGHTHEADEDNESS-ADULT-AH      "

## 2020-04-21 ENCOUNTER — HOSPITAL ENCOUNTER (EMERGENCY)
Facility: HOSPITAL | Age: 40
Discharge: HOME | End: 2020-04-21
Attending: EMERGENCY MEDICINE | Admitting: EMERGENCY MEDICINE

## 2020-04-21 ENCOUNTER — APPOINTMENT (OUTPATIENT)
Dept: GENERAL RADIOLOGY | Facility: HOSPITAL | Age: 40
End: 2020-04-21

## 2020-04-21 ENCOUNTER — APPOINTMENT (OUTPATIENT)
Dept: CT IMAGING | Facility: HOSPITAL | Age: 40
End: 2020-04-21

## 2020-04-21 VITALS
BODY MASS INDEX: 37.19 KG/M2 | SYSTOLIC BLOOD PRESSURE: 160 MMHG | HEIGHT: 77 IN | TEMPERATURE: 98.3 F | RESPIRATION RATE: 16 BRPM | OXYGEN SATURATION: 100 % | DIASTOLIC BLOOD PRESSURE: 83 MMHG | HEART RATE: 100 BPM | WEIGHT: 315 LBS

## 2020-04-21 DIAGNOSIS — I10 ESSENTIAL HYPERTENSION: ICD-10-CM

## 2020-04-21 DIAGNOSIS — I47.1 SVT (SUPRAVENTRICULAR TACHYCARDIA) (HCC): ICD-10-CM

## 2020-04-21 DIAGNOSIS — R77.8 TROPONIN LEVEL ELEVATED: ICD-10-CM

## 2020-04-21 DIAGNOSIS — I47.1 PSVT (PAROXYSMAL SUPRAVENTRICULAR TACHYCARDIA) (HCC): Primary | ICD-10-CM

## 2020-04-21 LAB
ALBUMIN SERPL-MCNC: 3.9 G/DL (ref 3.5–5.2)
ALBUMIN/GLOB SERPL: 0.8 G/DL
ALP SERPL-CCNC: 110 U/L (ref 39–117)
ALT SERPL W P-5'-P-CCNC: 50 U/L (ref 1–41)
ANION GAP SERPL CALCULATED.3IONS-SCNC: 15 MMOL/L (ref 5–15)
AST SERPL-CCNC: 51 U/L (ref 1–40)
BASOPHILS # BLD AUTO: 0.05 10*3/MM3 (ref 0–0.2)
BASOPHILS NFR BLD AUTO: 0.5 % (ref 0–1.5)
BILIRUB SERPL-MCNC: 0.3 MG/DL (ref 0.2–1.2)
BUN BLD-MCNC: 8 MG/DL (ref 6–20)
BUN/CREAT SERPL: 12.3 (ref 7–25)
CALCIUM SPEC-SCNC: 9.5 MG/DL (ref 8.6–10.5)
CHLORIDE SERPL-SCNC: 102 MMOL/L (ref 98–107)
CO2 SERPL-SCNC: 22 MMOL/L (ref 22–29)
CREAT BLD-MCNC: 0.65 MG/DL (ref 0.76–1.27)
D DIMER PPP FEU-MCNC: 0.26 MG/L (FEU) (ref 0–0.5)
DEPRECATED RDW RBC AUTO: 43.5 FL (ref 37–54)
EOSINOPHIL # BLD AUTO: 0.06 10*3/MM3 (ref 0–0.4)
EOSINOPHIL NFR BLD AUTO: 0.6 % (ref 0.3–6.2)
ERYTHROCYTE [DISTWIDTH] IN BLOOD BY AUTOMATED COUNT: 14.6 % (ref 12.3–15.4)
GFR SERPL CREATININE-BSD FRML MDRD: >150 ML/MIN/1.73
GLOBULIN UR ELPH-MCNC: 4.8 GM/DL
GLUCOSE BLD-MCNC: 200 MG/DL (ref 65–99)
HCT VFR BLD AUTO: 54.1 % (ref 37.5–51)
HGB BLD-MCNC: 17.1 G/DL (ref 13–17.7)
HOLD SPECIMEN: NORMAL
HOLD SPECIMEN: NORMAL
IMM GRANULOCYTES # BLD AUTO: 0.03 10*3/MM3 (ref 0–0.05)
IMM GRANULOCYTES NFR BLD AUTO: 0.3 % (ref 0–0.5)
LYMPHOCYTES # BLD AUTO: 1.73 10*3/MM3 (ref 0.7–3.1)
LYMPHOCYTES NFR BLD AUTO: 18.1 % (ref 19.6–45.3)
MCH RBC QN AUTO: 26.9 PG (ref 26.6–33)
MCHC RBC AUTO-ENTMCNC: 31.6 G/DL (ref 31.5–35.7)
MCV RBC AUTO: 85.1 FL (ref 79–97)
MONOCYTES # BLD AUTO: 0.7 10*3/MM3 (ref 0.1–0.9)
MONOCYTES NFR BLD AUTO: 7.3 % (ref 5–12)
NEUTROPHILS # BLD AUTO: 7.01 10*3/MM3 (ref 1.7–7)
NEUTROPHILS NFR BLD AUTO: 73.2 % (ref 42.7–76)
NRBC BLD AUTO-RTO: 0 /100 WBC (ref 0–0.2)
PLATELET # BLD AUTO: 261 10*3/MM3 (ref 140–450)
PMV BLD AUTO: 10.4 FL (ref 6–12)
POTASSIUM BLD-SCNC: 3.6 MMOL/L (ref 3.5–5.2)
PROT SERPL-MCNC: 8.7 G/DL (ref 6–8.5)
RBC # BLD AUTO: 6.36 10*6/MM3 (ref 4.14–5.8)
SODIUM BLD-SCNC: 139 MMOL/L (ref 136–145)
TROPONIN T SERPL-MCNC: 0.01 NG/ML (ref 0–0.03)
TROPONIN T SERPL-MCNC: 0.07 NG/ML (ref 0–0.03)
TROPONIN T SERPL-MCNC: 0.09 NG/ML (ref 0–0.03)
WBC NRBC COR # BLD: 9.58 10*3/MM3 (ref 3.4–10.8)
WHOLE BLOOD HOLD SPECIMEN: NORMAL
WHOLE BLOOD HOLD SPECIMEN: NORMAL

## 2020-04-21 PROCEDURE — 80053 COMPREHEN METABOLIC PANEL: CPT | Performed by: EMERGENCY MEDICINE

## 2020-04-21 PROCEDURE — 85025 COMPLETE CBC W/AUTO DIFF WBC: CPT | Performed by: EMERGENCY MEDICINE

## 2020-04-21 PROCEDURE — 93005 ELECTROCARDIOGRAM TRACING: CPT | Performed by: EMERGENCY MEDICINE

## 2020-04-21 PROCEDURE — 93010 ELECTROCARDIOGRAM REPORT: CPT | Performed by: INTERNAL MEDICINE

## 2020-04-21 PROCEDURE — 96374 THER/PROPH/DIAG INJ IV PUSH: CPT

## 2020-04-21 PROCEDURE — 99285 EMERGENCY DEPT VISIT HI MDM: CPT

## 2020-04-21 PROCEDURE — 96375 TX/PRO/DX INJ NEW DRUG ADDON: CPT

## 2020-04-21 PROCEDURE — 71045 X-RAY EXAM CHEST 1 VIEW: CPT

## 2020-04-21 PROCEDURE — 84484 ASSAY OF TROPONIN QUANT: CPT | Performed by: EMERGENCY MEDICINE

## 2020-04-21 PROCEDURE — 0 IOPAMIDOL PER 1 ML: Performed by: EMERGENCY MEDICINE

## 2020-04-21 PROCEDURE — 85379 FIBRIN DEGRADATION QUANT: CPT | Performed by: EMERGENCY MEDICINE

## 2020-04-21 PROCEDURE — 71275 CT ANGIOGRAPHY CHEST: CPT

## 2020-04-21 RX ORDER — CARVEDILOL 3.12 MG/1
3.12 TABLET ORAL 2 TIMES DAILY
Qty: 60 TABLET | Refills: 0 | Status: SHIPPED | OUTPATIENT
Start: 2020-04-21 | End: 2022-06-18

## 2020-04-21 RX ORDER — LABETALOL HYDROCHLORIDE 5 MG/ML
10 INJECTION, SOLUTION INTRAVENOUS ONCE
Status: COMPLETED | OUTPATIENT
Start: 2020-04-21 | End: 2020-04-21

## 2020-04-21 RX ORDER — METOPROLOL TARTRATE 5 MG/5ML
5 INJECTION INTRAVENOUS ONCE
Status: COMPLETED | OUTPATIENT
Start: 2020-04-21 | End: 2020-04-21

## 2020-04-21 RX ORDER — ASPIRIN 81 MG/1
324 TABLET, CHEWABLE ORAL ONCE
Status: COMPLETED | OUTPATIENT
Start: 2020-04-21 | End: 2020-04-21

## 2020-04-21 RX ORDER — SODIUM CHLORIDE 0.9 % (FLUSH) 0.9 %
10 SYRINGE (ML) INJECTION AS NEEDED
Status: DISCONTINUED | OUTPATIENT
Start: 2020-04-21 | End: 2020-04-21 | Stop reason: HOSPADM

## 2020-04-21 RX ADMIN — IOPAMIDOL 100 ML: 755 INJECTION, SOLUTION INTRAVENOUS at 14:07

## 2020-04-21 RX ADMIN — METOPROLOL TARTRATE 5 MG: 5 INJECTION INTRAVENOUS at 13:15

## 2020-04-21 RX ADMIN — LABETALOL HYDROCHLORIDE 10 MG: 5 INJECTION, SOLUTION INTRAVENOUS at 17:40

## 2020-04-21 RX ADMIN — ASPIRIN 81 MG 324 MG: 81 TABLET ORAL at 17:39

## 2020-04-21 NOTE — ED PROVIDER NOTES
Subjective   Patient with history of SVT in the past today had an another episode he was chemically cardioverted by the EMS with 2 doses of adenosine      Palpitations   Palpitations quality:  Regular  Onset quality:  Sudden  Timing:  Constant  Progression:  Resolved  Chronicity:  Recurrent  Context: anxiety    Context: not appetite suppressants, not blood loss, not bronchodilators, not dehydration, not exercise, not nicotine and not stimulant use    Relieved by:  Nothing  Worsened by:  Nothing  Ineffective treatments:  None tried  Associated symptoms: shortness of breath    Associated symptoms: no back pain, no chest pain, no cough, no diaphoresis, no dizziness, no leg pain, no lower extremity edema, no malaise/fatigue, no nausea, no near-syncope, no numbness, no orthopnea, no PND, no syncope, no vomiting and no weakness    Risk factors: no diabetes mellitus, no heart disease, no hx of PE, no hx of thyroid disease and no stress        Review of Systems   Constitutional: Negative.  Negative for diaphoresis and malaise/fatigue.   HENT: Negative.    Eyes: Negative.    Respiratory: Positive for shortness of breath. Negative for cough.    Cardiovascular: Positive for palpitations. Negative for chest pain, orthopnea, syncope, PND and near-syncope.   Gastrointestinal: Negative.  Negative for abdominal distention, abdominal pain, nausea and vomiting.   Endocrine: Negative.    Genitourinary: Negative.    Musculoskeletal: Negative.  Negative for back pain and neck pain.   Skin: Negative.  Negative for color change and pallor.   Neurological: Negative.  Negative for dizziness, syncope, weakness, light-headedness, numbness and headaches.   Hematological: Negative.  Does not bruise/bleed easily.   All other systems reviewed and are negative.      Past Medical History:   Diagnosis Date   • Diverticulitis of colon    • Hypertension    • Non morbid obesity due to excess calories 5/16/2017   • SVT (supraventricular tachycardia)  (CMS/[facility])        Allergies   Allergen Reactions   • Lisinopril Cough       History reviewed. No pertinent surgical history.    Family History   Problem Relation Age of Onset   • Hypertension Mother    • Hypertension Father    • No Known Problems Sister    • No Known Problems Maternal Grandmother    • No Known Problems Maternal Grandfather    • No Known Problems Paternal Grandmother    • No Known Problems Paternal Grandfather    • No Known Problems Sister        Social History     Socioeconomic History   • Marital status:      Spouse name: Not on file   • Number of children: Not on file   • Years of education: Not on file   • Highest education level: Not on file   Tobacco Use   • Smoking status: Never Smoker   • Smokeless tobacco: Never Used   Substance and Sexual Activity   • Alcohol use: Yes     Alcohol/week: 7.0 standard drinks     Types: 7 Cans of beer per week   • Drug use: No           Objective   Physical Exam   Constitutional: He is oriented to person, place, and time. He appears well-developed.  Non-toxic appearance.   HENT:   Head: Normocephalic and atraumatic.   Mouth/Throat: Uvula is midline and mucous membranes are normal.   Eyes: Pupils are equal, round, and reactive to light. Conjunctivae and lids are normal. Lids are everted and swept, no foreign bodies found.   Neck: Trachea normal, normal range of motion, full passive range of motion without pain and phonation normal. Neck supple. Normal carotid pulses and no JVD present. Carotid bruit is not present. No neck rigidity. No tracheal deviation present.   Cardiovascular: Normal rate, regular rhythm, normal heart sounds, intact distal pulses and normal pulses. PMI is not displaced.   Pulmonary/Chest: Effort normal and breath sounds normal. No accessory muscle usage or stridor. No apnea and no tachypnea. He has no decreased breath sounds. He has no wheezes. He has no rhonchi. He has no rales.   Abdominal: Soft. Normal appearance, normal aorta and  bowel sounds are normal. There is no hepatosplenomegaly. There is no tenderness.   Musculoskeletal: Normal range of motion.   Lower extremity exam bilaterally is unremarkable.  There is no right or left calf tenderness .  There is no palpable venous cord.  No obvious difference in the size of the legs.  No pitting edema.  The dorsalis pedis and posterior tibial femoral and popliteal pulses are palpable and +2 bilaterally.  Homans sign is negative   Neurological: He is alert and oriented to person, place, and time. He has normal strength and normal reflexes. He displays normal reflexes. No cranial nerve deficit or sensory deficit. Gait normal. GCS eye subscore is 4. GCS verbal subscore is 5. GCS motor subscore is 6.   Skin: Skin is warm, dry and intact. No cyanosis. No pallor. Nails show no clubbing.   Psychiatric: He has a normal mood and affect. His speech is normal and behavior is normal.   Nursing note and vitals reviewed.      Procedures           ED Course  ED Course as of Apr 21 1714   Tue Apr 21, 2020   1146 · Left ventricular systolic function is normal. Estimated EF appears to be in the range of 56 - 60%.  · Left ventricular diastolic dysfunction (grade I) consistent with impaired relaxation.  · Left ventricular wall thickness is consistent with mild concentric hypertrophy.  · No hemodynamically significant valvular abnormalities identified on the study.       [TS]   1632 Patient came to the ED via ambulance had an episode of SVT.  In the ER was given Lopressor his blood pressure under better control and so is his heart rate.  The initial work-up was negative his second set of cardiac markers are come back as positive I have discussed this case at length with the patient she he undergo a CT of the chest which is negative for pulmonary embolus keeping consideration his fast heart rate the troponin elevation could be secondary to that but the possibility of cardiac ischemia cannot be ruled out especially when  the patient is telling me that he has poor exercise tolerance and gets tachycardic when he can activity.  I have strongly encouraged him to be admitted to the hospital for further evaluation I have had a family discussion on the phone with his wife regarding the same thing the patient has decided that he wants to go home the possibility of increased morbidity and mortality has explained explained to the patientThe patient is agreeable and staying for this third set of cardiac markers and if they show a downward trend he wants to go homeI have told him that even a downward trending troponin does not mean that he does not have a cardiac ischemia and still he runs a risk of increased morbidity mortality he understands but if his third set of cardiac markers show a downward trend he is going to go home and follow-up with cardiology as an outpatientThe patient has been advised return the ER for any worsening symptoms    [TS]   1634 Risks and benefits of treatments given and alternative treatment options discussed with patient/family. I answered all the questions in simple, plain language, and there was voiced understanding and agreement with plan of care. There were no further questions. Differential diagnosis discussed. Patient/family was advised that the practice of medicine is not always an exact science, and sometimes tests, physical exam, or history may not show the underlying conditions with certainty. Additionally, the condition may change or show itself later after initial presentation. There was also expressed understanding and agreement with this limitation of emergency medicine practice. Patient/family was asked to return to ED if any problem or issues or if condition worsens or does not improved. Patient/family agreed to follow up with PCP/specialist as advised, or return to ED if unable to see a provider in a timely fashion for continued symptoms.     [TS]   1707 We are waiting on the third set of cardiac  markers and if they are showing a downward trend the patient wants to go home    [TS]   1707 Turned over to Dr. Correa    [TS]      ED Course User Index  [TS] Palmer Watkins MD Wells' Criteria (for pulmonary embolism) reviewed and/or performed as part of the patient evaluation and treatment planning process.  The result associated with this review/performance is: 3       MDM  Number of Diagnoses or Management Options  Diagnosis management comments: Palpitations morbid obesity SVT       Amount and/or Complexity of Data Reviewed  Clinical lab tests: ordered and reviewed  Tests in the radiology section of CPT®: ordered and reviewed  Tests in the medicine section of CPT®: reviewed and ordered    Risk of Complications, Morbidity, and/or Mortality  Presenting problems: moderate  Diagnostic procedures: moderate  Management options: moderate        Final diagnoses:   PSVT (paroxysmal supraventricular tachycardia) (CMS/HCC)   Troponin level elevated   Essential hypertension   SVT (supraventricular tachycardia) (CMS/HCC)            Palmer Watkins MD  04/21/20 1636       Palmer Watkins MD  04/21/20 1099

## 2020-04-21 NOTE — DISCHARGE INSTRUCTIONS
How to Take Your Blood Pressure  You can take your blood pressure at home with a machine. You may need to check your blood pressure at home:  · To check if you have high blood pressure (hypertension).  · To check your blood pressure over time.  · To make sure your blood pressure medicine is working.  Supplies needed:  You will need a blood pressure machine, or monitor. You can buy one at a [employer] or online. When choosing one:  · Choose one with an arm cuff.  · Choose one that wraps around your upper arm. Only one finger should fit between your arm and the cuff.  · Do not choose one that measures your blood pressure from your wrist or finger.  Your doctor can suggest a monitor.  How to prepare  Avoid these things for 30 minutes before checking your blood pressure:  · Drinking caffeine.  · Drinking alcohol.  · Eating.  · Smoking.  · Exercising.  Five minutes before checking your blood pressure:  · Pee.  · Sit in a dining chair. Avoid sitting in a soft couch or armchair.  · Be quiet. Do not talk.  How to take your blood pressure  Follow the instructions that came with your machine. If you have a digital blood pressure monitor, these may be the instructions:  1. Sit up straight.  2. Place your feet on the floor. Do not cross your ankles or legs.  3. Rest your left arm at the level of your heart. You may rest it on a table, desk, or chair.  4. Pull up your shirt sleeve.  5. Wrap the blood pressure cuff around the upper part of your left arm. The cuff should be 1 inch (2.5 cm) above your elbow. It is best to wrap the cuff around bare skin.  6. Fit the cuff snugly around your arm. You should be able to place only one finger between the cuff and your arm.  7. Put the cord inside the groove of your elbow.  8. Press the power button.  9. Sit quietly while the cuff fills with air and loses air.  10. Write down the numbers on the screen.  11. Wait 2-3 minutes and then repeat steps 1-10.  What do the numbers mean?  Two  "numbers make up your blood pressure. The first number is called systolic pressure. The second is called diastolic pressure. An example of a blood pressure reading is \"120 over 80\" (or 120/80).  If you are an adult and do not have a medical condition, use this guide to find out if your blood pressure is normal:  Normal  · First number: below 120.  · Second number: below 80.  Elevated  · First number: 120-129.  · Second number: below 80.  Hypertension stage 1  · First number: 130-139.  · Second number: 80-89.  Hypertension stage 2  · First number: 140 or above.  · Second number: 90 or above.  Your blood pressure is above normal even if only the top or bottom number is above normal.  Follow these instructions at home:  · Check your blood pressure as often as your doctor tells you to.  · Take your monitor to your next doctor's appointment. Your doctor will:  ? Make sure you are using it correctly.  ? Make sure it is working right.  · Make sure you understand what your blood pressure numbers should be.  · Tell your doctor if your medicines are causing side effects.  Contact a doctor if:  · Your blood pressure keeps being high.  Get help right away if:  · Your first blood pressure number is higher than 180.  · Your second blood pressure number is higher than 120.  This information is not intended to replace advice given to you by your health care provider. Make sure you discuss any questions you have with your health care provider.  Document Released: 11/30/2009 Document Revised: 11/15/2017 Document Reviewed: 05/26/2017  [employer] Patient Education © 2020 [employer]  Supraventricular Tachycardia, Adult  Supraventricular tachycardia (SVT) is a kind of abnormal heartbeat. It makes your heart beat very fast and then beat at a normal speed.      Heart Disease Prevention  Heart disease is the leading cause of death in the world. Coronary artery disease is the most common cause of heart disease. This condition " results when cholesterol and other substances (plaque) build up inside the walls of the blood vessels that supply your heart muscle (arteries). This buildup in arteries is called atherosclerosis. You can take actions to lower your risk of heart disease.  How can heart disease affect me?  Heart disease can cause many unpleasant symptoms and complications, such as:  · Chest pain (angina).  · Reduced or blocked blood flow to your heart. This can cause:  ? Irregular heartbeats (arrhythmias).  ? Heart attack.  ? Heart failure.  What can increase my risk?  The following factors may make you more likely to develop this condition:  · High blood pressure (hypertension).  · High cholesterol.  · Smoking.  · A diet high in saturated fats or trans fats.  · Lack of physical activity.  · Obesity.  · Drinking too much alcohol.  · Diabetes.  · Having a family history of heart disease.  What actions can I take to prevent heart disease?  Nutrition    · Eat a heart-healthy eating plan as told by your health care provider. Examples include the DASH (Dietary Approaches to Stop Hypertension) eating plan or the Mediterranean diet.  · Generally, it is recommended that you:  ? Eat less salt (sodium). Ask your health care provider how much sodium is safe for you. Most people should have less than 2,300 mg each day.  ? Limit unhealthy fats, such as saturated and trans fats, in your diet. You can do this by eating low-fat dairy products, eating less red meat, and avoiding processed foods.  ? Eat healthy fats (omega-3 fatty acids). These are found in fish, such as mackerel or salmon.  ? Eat more fruits and vegetables. You should try to fill one-half of your plate with fruits and vegetables at each meal.  ? Eat more whole grains.  ? Avoid foods and drinks that have added sugars.  Lifestyle    · Get regular exercise. This is one of the most important things you can do for your health. Generally, it is recommended that you:  ? Exercise for at least  30 minutes on most days of the week (150 minutes each week). The exercise should increase your heart rate and make you sweat (aerobic exercise).  ? Add strength exercises on at least 2 days each week.  · Do not use any products that contain nicotine or tobacco, such as cigarettes and e-cigarettes. These can damage your heart and blood vessels. If you need help quitting, ask your health care provider.  Alcohol use  · Do not drink alcohol if:  ? Your health care provider tells you not to drink.  ? You are pregnant, may be pregnant, or are planning to become pregnant.  · If you drink alcohol, limit how much you have:  ? 0-1 drink a day for women.  ? 0-2 drinks a day for men.  · Be aware of how much alcohol is in your drink. In the [place] one drink equals one typical bottle of beer (12 oz), one-half glass of wine (5 oz), or one shot of hard liquor (1½ oz).  Medicines  · Take over-the-counter and prescription medicines only as told by your health care provider.  · Ask your health care provider whether you should take an aspirin every day. Taking aspirin may help reduce your risk of heart disease and stroke.  · Depending on your risk factors, your health care provider may prescribe medicines to lower your risk of heart disease or to control related conditions. You may take medicine to:  ? Lower cholesterol.  ? Control blood pressure.  ? Control diabetes.  General information  · Keep your blood pressure under control, as recommended by your health care provider. For most healthy people, the upper number of your blood pressure (systolic) should be no higher than 120, and the lower number (diastolic) no higher than 80. Treatment may be needed if your blood pressure is higher than 130/80.  · Have your blood pressure checked at least every two years. Your health care provider may check your blood pressure more often if you have high blood pressure.  · After age 20, have your cholesterol checked every 4-6 years. If you have risk  factors for heart disease, you may need to have it checked more frequently. Treatment may be needed if your cholesterol is high.  · Have your body mass index (BMI) checked every year. Your health care provider can calculate your BMI from your height and weight.  · Work with your health care provider to lose weight, if needed, or to maintain a healthy weight.  Where to find more information:  · Centers for Disease Control and Prevention: www.cdc.gov/heartdisease  · American Heart Association: www.heart.org  ? Take a free online heart disease risk quiz to better understand your personal risk factors.  Summary  · Heart disease is the leading cause of death in the world.  · Heart disease can cause chest pain, abnormal heart rhythms, heart attack, and heart failure.  · High blood pressure, high cholesterol, and smoking are the main risk factors for heart disease, although other factors also contribute.  · You can take actions to lower your chances of developing heart disease. Work with your health care provider to reduce your risk by following a heart-healthy diet, being physically active, and controlling your weight, blood pressure, and cholesterol level.  This information is not intended to replace advice given to you by your health care provider. Make sure you discuss any questions you have with your health care provider.  Document Released: 08/01/2005 Document Revised: 01/02/2019 Document Reviewed: 01/02/2019  [employer] Patient Education © 2020 [employer]      Hypertension, Adult  Hypertension is another name for high blood pressure. High blood pressure forces your heart to work harder to pump blood. This can cause problems over time.  There are two numbers in a blood pressure reading. There is a top number (systolic) over a bottom number (diastolic). It is best to have a blood pressure that is below 120/80. Healthy choices can help lower your blood pressure, or you may need medicine to help lower  it.  What are the causes?  The cause of this condition is not known. Some conditions may be related to high blood pressure.  What increases the risk?  · Smoking.  · Having type 2 diabetes mellitus, high cholesterol, or both.  · Not getting enough exercise or physical activity.  · Being overweight.  · Having too much fat, sugar, calories, or salt (sodium) in your diet.  · Drinking too much alcohol.  · Having long-term (chronic) kidney disease.  · Having a family history of high blood pressure.  · Age. Risk increases with age.  · Race. You may be at higher risk if you are .  · Gender. Men are at higher risk than women before age 45. After age 65, women are at higher risk than men.  · Having obstructive sleep apnea.  · Stress.  What are the signs or symptoms?  · High blood pressure may not cause symptoms. Very high blood pressure (hypertensive crisis) may cause:  ? Headache.  ? Feelings of worry or nervousness (anxiety).  ? Shortness of breath.  ? Nosebleed.  ? A feeling of being sick to your stomach (nausea).  ? Throwing up (vomiting).  ? Changes in how you see.  ? Very bad chest pain.  ? Seizures.  How is this treated?  · This condition is treated by making healthy lifestyle changes, such as:  ? Eating healthy foods.  ? Exercising more.  ? Drinking less alcohol.  · Your health care provider may prescribe medicine if lifestyle changes are not enough to get your blood pressure under control, and if:  ? Your top number is above 130.  ? Your bottom number is above 80.  · Your personal target blood pressure may vary.  Follow these instructions at home:  Eating and drinking    · If told, follow the DASH eating plan. To follow this plan:  ? Fill one half of your plate at each meal with fruits and vegetables.  ? Fill one fourth of your plate at each meal with whole grains. Whole grains include whole-wheat pasta, brown rice, and whole-grain bread.  ? Eat or drink low-fat dairy products, such as skim milk or  low-fat yogurt.  ? Fill one fourth of your plate at each meal with low-fat (lean) proteins. Low-fat proteins include fish, chicken without skin, eggs, beans, and tofu.  ? Avoid fatty meat, cured and processed meat, or chicken with skin.  ? Avoid pre-made or processed food.  · Eat less than 1,500 mg of salt each day.  · Do not drink alcohol if:  ? Your doctor tells you not to drink.  ? You are pregnant, may be pregnant, or are planning to become pregnant.  · If you drink alcohol:  ? Limit how much you use to:  § 0-1 drink a day for women.  § 0-2 drinks a day for men.  ? Be aware of how much alcohol is in your drink. In the [place] one drink equals one 12 oz bottle of beer (355 mL), one 5 oz glass of wine (148 mL), or one 1½ oz glass of hard liquor (44 mL).  Lifestyle    · Work with your doctor to stay at a healthy weight or to lose weight. Ask your doctor what the best weight is for you.  · Get at least 30 minutes of exercise most days of the week. This may include walking, swimming, or biking.  · Get at least 30 minutes of exercise that strengthens your muscles (resistance exercise) at least 3 days a week. This may include lifting weights or doing Pilates.  · Do not use any products that contain nicotine or tobacco, such as cigarettes, e-cigarettes, and chewing tobacco. If you need help quitting, ask your doctor.  · Check your blood pressure at home as told by your doctor.  · Keep all follow-up visits as told by your doctor. This is important.  Medicines  · Take over-the-counter and prescription medicines only as told by your doctor. Follow directions carefully.  · Do not skip doses of blood pressure medicine. The medicine does not work as well if you skip doses. Skipping doses also puts you at risk for problems.  · Ask your doctor about side effects or reactions to medicines that you should watch for.  Contact a doctor if you:  · Think you are having a reaction to the medicine you are taking.  · Have headaches that  keep coming back (recurring).  · Feel dizzy.  · Have swelling in your ankles.  · Have trouble with your vision.  Get help right away if you:  · Get a very bad headache.  · Start to feel mixed up (confused).  · Feel weak or numb.  · Feel faint.  · Have very bad pain in your:  ? Chest.  ? Belly (abdomen).  · Throw up more than once.  · Have trouble breathing.  Summary  · Hypertension is another name for high blood pressure.  · High blood pressure forces your heart to work harder to pump blood.  · For most people, a normal blood pressure is less than 120/80.  · Making healthy choices can help lower blood pressure. If your blood pressure does not get lower with healthy choices, you may need to take medicine.  This information is not intended to replace advice given to you by your health care provider. Make sure you discuss any questions you have with your health care provider.  Document Released: 06/05/2009 Document Revised: 08/28/2019 Document Reviewed: 08/28/2019  [employer] Interactive Patient Education © 2020 [employer]  A normal resting heartbeat is  times a minute. This condition can make your heart beat more than 150 times a minute. Times of having a fast heartbeat (episodes) can be scary, but they are usually not dangerous. In some cases, they may lead to heart failure if:  · They happen many times per day.  · Last longer than a few seconds.  What are the causes?    A normal heartbeat starts when an area called the sinoatrial node sends out an electrical signal. In SVT, other areas of the heart send out signals that get in the way of the signal from the sinoatrial node.  What increases the risk?  You are more likely to develop this condition if you are:  · 12-30 years old.  · A woman.  The following factors may make you more likely to develop this condition:  · Stress.  · Tiredness.  · Smoking.  · Stimulant drugs, such as cocaine and methamphetamine.  · Alcohol.  · Caffeine.  · Pregnancy.  · Feeling  worried or nervous (anxiety).  What are the signs or symptoms?  · A pounding heart.  · A feeling that your heart is skipping beats (palpitations).  · Weakness.  · Trouble getting enough air.  · Pain or tightness in your chest.  · Feeling like you are going to pass out (faint).  · Feeling worried or nervous.  · Dizziness.  · Sweating.  · Feeling sick to your stomach (nausea).  · Passing out.  · Tiredness.  Sometimes, there are no symptoms.  How is this treated?  · Vagal nerve stimulation. Ways to do this include:  ? Holding your breath and pushing, as though you are pooping (having a bowel movement).  ? Massaging an area on one side of your neck. Do not try this yourself. Only a doctor should do this. If done the wrong way, it can lead to a stroke.  ? Bending forward with your head between your legs.  ? Coughing while bending forward with your head between your legs.  ? Closing your eyes and massaging your eyeballs. Ask a doctor how to do this.  · Medicines that prevent attacks.  · Medicine to stop an attack given through an IV tube at the hospital.  · A small electric shock (cardioversion) that stops an attack.  · Radiofrequency ablation. In this procedure, a small, thin tube (catheter) is used to send energy to the area that is causing the rapid heartbeats.  If you do not have symptoms, you may not need treatment.  Follow these instructions at home:  Stress  · Avoid things that make you feel stressed.  · To deal with stress, try:  ? Doing yoga or meditation, or being out in nature.  ? Listening to relaxing music.  ? Doing deep breathing.  ? Taking steps to be healthy, such as getting lots of sleep, exercising, and eating a balanced diet.  ? Talking with a mental health doctor.  Lifestyle    · Try to get at least 7 hours of sleep each night.  · Do not use any products that contain nicotine or tobacco, such as cigarettes, e-cigarettes, and chewing tobacco. If you need help quitting, ask your doctor.  · Be aware of  how alcohol affects you.  ? If alcohol gives you a fast heartbeat, do not drink alcohol.  ? If alcohol does not seem to give you a fast heartbeat, limit alcohol use to no more than 1 drink a day for women who are not pregnant, and 2 drinks a day for men. In the [place] one drink is one of these:  ? 12 oz of beer (355 mL).  ? 5 oz of wine (148 mL).  ? 1½ oz of hard liquor (44 mL).  · Be aware of how caffeine affects you.  ? If caffeine gives you a fast heartbeat, do not eat, drink, or use anything with caffeine in it.  ? If caffeine does not seem to give you a fast heartbeat, limit how much caffeine you eat, drink, or use.  · Do not use stimulant drugs. If you need help quitting, ask your doctor.  General instructions  · Stay at a healthy weight.  · Exercise regularly. Ask your doctor about good activities for you. Try one or a mixture of these:  ? 150 minutes a week of gentle exercise, like walking or yoga.  ? 75 minutes a week of exercise that is very active, like running or swimming.  · Do vagus nerve treatments to slow down your heartbeat as told by your doctor.  · Take over-the-counter and prescription medicines only as told by your doctor.  · Keep all follow-up visits as told by your doctor. This is important.  Contact a doctor if:  · You have a fast heartbeat more often.  · Times of having a fast heartbeat last longer than before.  · Home treatments to slow down your heartbeat do not help.  · You have new symptoms.  Get help right away if:  · You have chest pain.  · Your symptoms get worse.  · You have trouble breathing.  · Your heart beats very fast for more than 20 minutes.  · You pass out.  These symptoms may be an emergency. Do not wait to see if the symptoms will go away. Get medical help right away. Call your local emergency services (911 in the [place] Do not drive yourself to the hospital.  Summary  · SVT is a type of abnormal heart beat.  · This condition can make your heart beat more than 150 times a  minute.  · Treatment depends on how often the condition happens and your symptoms.  This information is not intended to replace advice given to you by your health care provider. Make sure you discuss any questions you have with your health care provider.  Document Released: 12/18/2006 Document Revised: 11/05/2019 Document Reviewed: 11/05/2019  [employer] Patient Education © 2020 [employer]

## 2020-04-22 NOTE — ED PROVIDER NOTES
Subjective   History of Present Illnessthis is an addendum.  See Dr Pickens note for full H&P    Review of Systems    Past Medical History:   Diagnosis Date   • Diverticulitis of colon    • Hypertension    • Non morbid obesity due to excess calories 5/16/2017   • SVT (supraventricular tachycardia) (CMS/HCC)        Allergies   Allergen Reactions   • Lisinopril Cough       History reviewed. No pertinent surgical history.    Family History   Problem Relation Age of Onset   • Hypertension Mother    • Hypertension Father    • No Known Problems Sister    • No Known Problems Maternal Grandmother    • No Known Problems Maternal Grandfather    • No Known Problems Paternal Grandmother    • No Known Problems Paternal Grandfather    • No Known Problems Sister        Social History     Socioeconomic History   • Marital status:      Spouse name: Not on file   • Number of children: Not on file   • Years of education: Not on file   • Highest education level: Not on file   Tobacco Use   • Smoking status: Never Smoker   • Smokeless tobacco: Never Used   Substance and Sexual Activity   • Alcohol use: Yes     Alcohol/week: 7.0 standard drinks     Types: 7 Cans of beer per week   • Drug use: No           Objective   Physical Exam    Procedures           ED Course  ED Course as of Apr 21 1906   Tue Apr 21, 2020   1146 · Left ventricular systolic function is normal. Estimated EF appears to be in the range of 56 - 60%.  · Left ventricular diastolic dysfunction (grade I) consistent with impaired relaxation.  · Left ventricular wall thickness is consistent with mild concentric hypertrophy.  · No hemodynamically significant valvular abnormalities identified on the study.       [TS]   1632 Patient came to the ED via ambulance had an episode of SVT.  In the ER was given Lopressor his blood pressure under better control and so is his heart rate.  The initial work-up was negative his second set of cardiac markers are come back as positive  I have discussed this case at length with the patient she he undergo a CT of the chest which is negative for pulmonary embolus keeping consideration his fast heart rate the troponin elevation could be secondary to that but the possibility of cardiac ischemia cannot be ruled out especially when the patient is telling me that he has poor exercise tolerance and gets tachycardic when he can activity.  I have strongly encouraged him to be admitted to the hospital for further evaluation I have had a family discussion on the phone with his wife regarding the same thing the patient has decided that he wants to go home the possibility of increased morbidity and mortality has explained explained to the patientThe patient is agreeable and staying for this third set of cardiac markers and if they show a downward trend he wants to go homeI have told him that even a downward trending troponin does not mean that he does not have a cardiac ischemia and still he runs a risk of increased morbidity mortality he understands but if his third set of cardiac markers show a downward trend he is going to go home and follow-up with cardiology as an outpatientThe patient has been advised return the ER for any worsening symptoms    [TS]   1634 Risks and benefits of treatments given and alternative treatment options discussed with patient/family. I answered all the questions in simple, plain language, and there was voiced understanding and agreement with plan of care. There were no further questions. Differential diagnosis discussed. Patient/family was advised that the practice of medicine is not always an exact science, and sometimes tests, physical exam, or history may not show the underlying conditions with certainty. Additionally, the condition may change or show itself later after initial presentation. There was also expressed understanding and agreement with this limitation of emergency medicine practice. Patient/family was asked to  return to ED if any problem or issues or if condition worsens or does not improved. Patient/family agreed to follow up with PCP/specialist as advised, or return to ED if unable to see a provider in a timely fashion for continued symptoms.     [TS]   1707 We are waiting on the third set of cardiac markers and if they are showing a downward trend the patient wants to go home    [TS]   1707 Turned over to Dr. Correa    [TS]      ED Course User Index  [TS] Palmer Watkins MD           Lab Results (last 24 hours)     Procedure Component Value Units Date/Time    CBC & Differential [[id number]] Collected:  04/21/20 1214    Specimen:  Blood Updated:  04/21/20 1221    Narrative:       The following orders were created for panel order CBC & Differential.  Procedure                               Abnormality         Status                     ---------                               -----------         ------                     CBC Auto Differential[[id number]]        Abnormal            Final result                 Please view results for these tests on the individual orders.    Comprehensive Metabolic Panel [[id number]]  (Abnormal) Collected:  04/21/20 1214    Specimen:  Blood Updated:  04/21/20 1248     Glucose 200 mg/dL      BUN 8 mg/dL      Creatinine 0.65 mg/dL      Sodium 139 mmol/L      Potassium 3.6 mmol/L      Chloride 102 mmol/L      CO2 22.0 mmol/L      Calcium 9.5 mg/dL      Total Protein 8.7 g/dL      Albumin 3.90 g/dL      ALT (SGPT) 50 U/L      AST (SGOT) 51 U/L      Comment: Specimen hemolyzed.  Results may be affected.        Alkaline Phosphatase 110 U/L      Total Bilirubin 0.3 mg/dL      eGFR  African Amer >150 mL/min/1.73      Globulin 4.8 gm/dL      A/G Ratio 0.8 g/dL      BUN/Creatinine Ratio 12.3     Anion Gap 15.0 mmol/L     Narrative:       GFR Normal >60  Chronic Kidney Disease <60  Kidney Failure <15      Troponin [[id number]]  (Normal) Collected:  04/21/20 1214    Specimen:  Blood Updated:  04/21/20  1238     Troponin T 0.012 ng/mL     Narrative:       Troponin T Reference Range:  <= 0.03 ng/mL-   Negative for AMI  >0.03 ng/mL-     Abnormal for myocardial necrosis.  Clinicians would have to utilize clinical acumen, EKG, Troponin and serial changes to determine if it is an Acute Myocardial Infarction or myocardial injury due to an underlying chronic condition.       Results may be falsely decreased if patient taking Biotin.      CBC Auto Differential [[id number]]  (Abnormal) Collected:  04/21/20 1214    Specimen:  Blood Updated:  04/21/20 1221     WBC 9.58 10*3/mm3      RBC 6.36 10*6/mm3      Hemoglobin 17.1 g/dL      Hematocrit 54.1 %      MCV 85.1 fL      MCH 26.9 pg      MCHC 31.6 g/dL      RDW 14.6 %      RDW-SD 43.5 fl      MPV 10.4 fL      Platelets 261 10*3/mm3      Neutrophil % 73.2 %      Lymphocyte % 18.1 %      Monocyte % 7.3 %      Eosinophil % 0.6 %      Basophil % 0.5 %      Immature Grans % 0.3 %      Neutrophils, Absolute 7.01 10*3/mm3      Lymphocytes, Absolute 1.73 10*3/mm3      Monocytes, Absolute 0.70 10*3/mm3      Eosinophils, Absolute 0.06 10*3/mm3      Basophils, Absolute 0.05 10*3/mm3      Immature Grans, Absolute 0.03 10*3/mm3      nRBC 0.0 /100 WBC     D-dimer, Quantitative [[id number]]  (Normal) Collected:  04/21/20 1214    Specimen:  Blood Updated:  04/21/20 1231     D-Dimer, Quantitative 0.26 mg/L (FEU)     Narrative:       Reference Range is 0-0.50 mg/L FEU. However, results <0.50 mg/L FEU tends to rule out DVT or PE. Results >0.50 mg/L FEU are not useful in predicting absence or presence of DVT or PE.      Troponin [[id number]]  (Abnormal) Collected:  04/21/20 1435    Specimen:  Blood Updated:  04/21/20 1459     Troponin T 0.068 ng/mL     Narrative:       Troponin T Reference Range:  <= 0.03 ng/mL-   Negative for AMI  >0.03 ng/mL-     Abnormal for myocardial necrosis.  Clinicians would have to utilize clinical acumen, EKG, Troponin and serial changes to determine if it is an Acute  Myocardial Infarction or myocardial injury due to an underlying chronic condition.       Results may be falsely decreased if patient taking Biotin.      Troponin [[id number]]  (Abnormal) Collected:  04/21/20 1750    Specimen:  Blood Updated:  04/21/20 1822     Troponin T 0.086 ng/mL     Narrative:       Troponin T Reference Range:  <= 0.03 ng/mL-   Negative for AMI  >0.03 ng/mL-     Abnormal for myocardial necrosis.  Clinicians would have to utilize clinical acumen, EKG, Troponin and serial changes to determine if it is an Acute Myocardial Infarction or myocardial injury due to an underlying chronic condition.       Results may be falsely decreased if patient taking Biotin.          CT Angiogram Chest   Final Result   Somewhat limited study due to a suboptimal contrast bolus   and attenuation artifact related to the patient's body habitus, however   without evidence of pulmonary embolism or acute intrathoracic pathology.   Hepatic steatosis is evident.                   This report was finalized on 04/21/2020 14:36 by Dr. Lowell Wilson MD.      XR Chest 1 View   Final Result   1. No acute cardiopulmonary findings.   This report was finalized on 04/21/2020 13:51 by Dr Madeline Arauz MD.        Labs showed mild hyperglycemia, mildly elevated AST and ALT, and mildly elevated troponin.  Third troponin was only mildly higher than the second.  I truly feel this was related to the rate during the patient's SVT episode.  I had a long discussion with the patient.  He has had absolutely no symptoms once his SVT was converted.  He has had no chest pain or shortness of breath.  His 3rd EKG showed normal sinus rhythm with a rate of 93, no acute ischemia or infarction.  Patient states he has recently been furloughed from his job and is also scared of COVID and does not want to be admitted to the hospital.  He will follow-up with Dr. Hector in the cardiology clinic.  Dr. Watkins ordered a Holter monitor.  I will add carvedilol  to the patient's medications.  I feel like his troponin elevation was related only to his SVT but I did tell the patient I cannot rule out a small MI.  He is aware of this but still chooses not be admitted.  He is return to the ER immediately for any chest pain, shortness of breath or other concerns.                                MDM    Final diagnoses:   PSVT (paroxysmal supraventricular tachycardia) (CMS/HCC)   Troponin level elevated   Essential hypertension   SVT (supraventricular tachycardia) (CMS/[facility])            Madeline Arzola MD  04/21/20 0995

## 2020-04-24 ENCOUNTER — HOSPITAL ENCOUNTER (OUTPATIENT)
Dept: CARDIOLOGY | Facility: HOSPITAL | Age: 40
Discharge: HOME | End: 2020-04-24
Admitting: EMERGENCY MEDICINE

## 2020-04-24 DIAGNOSIS — I47.1 SVT (SUPRAVENTRICULAR TACHYCARDIA) (HCC): ICD-10-CM

## 2020-04-24 PROCEDURE — 0296T HC EXT ECG > 48HR TO 21 DAY RCRD W/CONECT INTL RCRD: CPT

## 2020-05-18 PROCEDURE — 0298T PR EXT ECG > 48HR TO 21 DAY REVIEW AND INTERPRETATN: CPT | Performed by: INTERNAL MEDICINE

## 2020-06-01 ENCOUNTER — TELEPHONE (OUTPATIENT)
Dept: CARDIOLOGY | Facility: CLINIC | Age: 40
End: 2020-06-01

## 2020-06-03 NOTE — TELEPHONE ENCOUNTER
Below are the results of the monitor.  HE did have several short runs of SVT, but as long as stable and without symptoms, he should be ok to resume work,.      · An abnormal monitor study.  · The predominant rhythm noted during the testing period was sinus rhythm.  · Average HR: 85. Min HR: 51. Max HR: 197.  · Premature atrial contractions occured rarely, with several short runs of SVT, the longest being 14 beats in duration, during which no symptoms were reported.  · Premature ventricular contractions occured rarely, predominantly as isolated PVCs, also occurring in a pattern of bigeminy and trigeminy for short periods of time. There was also one short run of nonsustained ventricular tachycardia, during which no symptoms were reported..  · No symptoms were reported. Any patient triggered events occurred during sinus rhythm or sinus rhythm with an isolated PVC.  · No sustained cardiac arrhythmias and no significant pauses.  · No high risk features.

## 2020-06-03 NOTE — TELEPHONE ENCOUNTER
Patient notified and he verbalized understanding. Typed up return to work letter and patient will pick it up tomorrow.

## 2021-03-15 ENCOUNTER — APPOINTMENT (OUTPATIENT)
Dept: VACCINE CLINIC | Facility: HOSPITAL | Age: 41
End: 2021-03-15

## 2021-03-25 ENCOUNTER — IMMUNIZATION (OUTPATIENT)
Dept: VACCINE CLINIC | Facility: HOSPITAL | Age: 41
End: 2021-03-25

## 2021-03-25 PROCEDURE — 91301 HC SARSCO02 VAC 100MCG/0.5ML IM: CPT | Performed by: OBSTETRICS & GYNECOLOGY

## 2021-03-25 PROCEDURE — 0011A: CPT | Performed by: OBSTETRICS & GYNECOLOGY

## 2021-04-12 ENCOUNTER — APPOINTMENT (OUTPATIENT)
Dept: VACCINE CLINIC | Facility: HOSPITAL | Age: 41
End: 2021-04-12

## 2021-04-22 ENCOUNTER — IMMUNIZATION (OUTPATIENT)
Dept: VACCINE CLINIC | Facility: HOSPITAL | Age: 41
End: 2021-04-22

## 2021-04-22 PROCEDURE — 0012A: CPT | Performed by: OBSTETRICS & GYNECOLOGY

## 2021-04-22 PROCEDURE — 91301 HC SARSCO02 VAC 100MCG/0.5ML IM: CPT | Performed by: OBSTETRICS & GYNECOLOGY

## 2021-06-07 NOTE — TELEPHONE ENCOUNTER
Patient called wanting results of monitor and also needs letter saying he can go back to work. Please advise.   Consent 3/Introductory Paragraph: I gave the patient a chance to ask questions they had about the procedure.  Following this I explained the Mohs procedure and consent was obtained. The risks, benefits and alternatives to therapy were discussed in detail. Specifically, the risks of infection, scarring, bleeding, prolonged wound healing, incomplete removal, allergy to anesthesia, nerve injury and recurrence were addressed. Prior to the procedure, the treatment site was clearly identified and confirmed by the patient. All components of Universal Protocol/PAUSE Rule completed.

## 2022-06-18 ENCOUNTER — TELEMEDICINE (OUTPATIENT)
Dept: FAMILY MEDICINE CLINIC | Facility: TELEHEALTH | Age: 42
End: 2022-06-18

## 2022-06-18 VITALS — HEIGHT: 77 IN | WEIGHT: 315 LBS | BODY MASS INDEX: 37.19 KG/M2

## 2022-06-18 DIAGNOSIS — J40 BRONCHITIS: Primary | ICD-10-CM

## 2022-06-18 PROCEDURE — 99203 OFFICE O/P NEW LOW 30 MIN: CPT | Performed by: NURSE PRACTITIONER

## 2022-06-18 RX ORDER — DEXTROMETHORPHAN HYDROBROMIDE AND PROMETHAZINE HYDROCHLORIDE 15; 6.25 MG/5ML; MG/5ML
5 SYRUP ORAL NIGHTLY PRN
Qty: 118 ML | Refills: 0 | Status: SHIPPED | OUTPATIENT
Start: 2022-06-18 | End: 2022-07-16

## 2022-06-18 RX ORDER — ALBUTEROL SULFATE 90 UG/1
2 AEROSOL, METERED RESPIRATORY (INHALATION) EVERY 4 HOURS PRN
Qty: 18 G | Refills: 0 | Status: SHIPPED | OUTPATIENT
Start: 2022-06-18 | End: 2022-12-05

## 2022-06-18 RX ORDER — PREDNISONE 20 MG/1
TABLET ORAL
Qty: 13 TABLET | Refills: 0 | Status: SHIPPED | OUTPATIENT
Start: 2022-06-18 | End: 2022-07-16

## 2022-06-18 NOTE — PROGRESS NOTES
You have chosen to receive care through a telehealth visit.  Do you consent to use a video/audio connection for your medical care today? Yes     CHIEF COMPLAINT  Cc: cough    HPI  Marlon Mccarthy is a 42 y.o. male  presents with complaint of cough, He reports that he has had it for about 3 weeks now but that it has gotten worse in the past 2 days. He reports that he has had to sleep sitting up the past two days. He reports some wheezing and that the cough is productive and clear at times. He did test for COVID and the results of that test were negative. He is fully vaccinated for COVID via two doses of the Moderna vaccine. He is taking Mucinex for his symptoms.    Review of Systems   Constitutional: Negative for fatigue and fever.   HENT: Positive for congestion (off and on) and postnasal drip. Negative for sinus pressure, sinus pain and sore throat. Sneezing: resolved.    Respiratory: Positive for cough and wheezing. Negative for apnea and shortness of breath.    Cardiovascular: Negative for chest pain and leg swelling.   Gastrointestinal: Negative for diarrhea and nausea.   Musculoskeletal: Negative for myalgias.   Neurological: Negative for headaches.       Past Medical History:   Diagnosis Date   • Diverticulitis of colon    • Hypertension    • Non morbid obesity due to excess calories 5/16/2017   • SVT (supraventricular tachycardia) ([facility])        Family History   Problem Relation Age of Onset   • Hypertension Mother    • Hypertension Father    • No Known Problems Sister    • No Known Problems Maternal Grandmother    • No Known Problems Maternal Grandfather    • No Known Problems Paternal Grandmother    • No Known Problems Paternal Grandfather    • No Known Problems Sister        Social History     Socioeconomic History   • Marital status:    Tobacco Use   • Smoking status: Never Smoker   • Smokeless tobacco: Never Used   Substance and Sexual Activity   • Alcohol use: Yes     Alcohol/week: 7.0 standard  "drinks     Types: 7 Cans of beer per week   • Drug use: No       Marlon Mccarthy  reports that he has never smoked. He has never used smokeless tobacco..  Ht 195.6 cm (77\")   Wt (!) 212 kg (467 lb)   BMI 55.38 kg/m²     PHYSICAL EXAM  Physical Exam   Constitutional: He is oriented to person, place, and time. He appears well-developed and well-nourished.   HENT:   Head: Normocephalic and atraumatic.   Right Ear: External ear normal.   Left Ear: External ear normal.   Nose: Congestion present. Right sinus exhibits no maxillary sinus tenderness and no frontal sinus tenderness. Left sinus exhibits no maxillary sinus tenderness and no frontal sinus tenderness.   Mouth/Throat: Mouth/Lips are normal.  Eyes: Lids are normal. Right eye exhibits no discharge and no exudate. Left eye exhibits no discharge and no exudate. Right conjunctiva is not injected. Left conjunctiva is not injected.   Pulmonary/Chest: No accessory muscle usage. No tachypnea and no bradypnea.  No respiratory distress.No use of oxygen by nasal cannulaNo use of oxygen by mask noted.  Abdominal: Abdomen appears normal.   Neurological: He is alert and oriented to person, place, and time. No cranial nerve deficit.   Skin: His skin appears normal.  Psychiatric: He has a normal mood and affect. His speech is normal and behavior is normal. Judgment and thought content normal.       Results for orders placed or performed during the hospital encounter of 04/21/20   Comprehensive Metabolic Panel    Specimen: Blood   Result Value Ref Range    Glucose 200 (H) 65 - 99 mg/dL    BUN 8 6 - 20 mg/dL    Creatinine 0.65 (L) 0.76 - 1.27 mg/dL    Sodium 139 136 - 145 mmol/L    Potassium 3.6 3.5 - 5.2 mmol/L    Chloride 102 98 - 107 mmol/L    CO2 22.0 22.0 - 29.0 mmol/L    Calcium 9.5 8.6 - 10.5 mg/dL    Total Protein 8.7 (H) 6.0 - 8.5 g/dL    Albumin 3.90 3.50 - 5.20 g/dL    ALT (SGPT) 50 (H) 1 - 41 U/L    AST (SGOT) 51 (H) 1 - 40 U/L    Alkaline Phosphatase 110 39 - 117 " U/L    Total Bilirubin 0.3 0.2 - 1.2 mg/dL    eGFR  African Amer >150 >60 mL/min/1.73    Globulin 4.8 gm/dL    A/G Ratio 0.8 g/dL    BUN/Creatinine Ratio 12.3 7.0 - 25.0    Anion Gap 15.0 5.0 - 15.0 mmol/L   Troponin    Specimen: Blood   Result Value Ref Range    Troponin T 0.012 0.000 - 0.030 ng/mL   Troponin    Specimen: Blood   Result Value Ref Range    Troponin T 0.068 (C) 0.000 - 0.030 ng/mL   CBC Auto Differential    Specimen: Blood   Result Value Ref Range    WBC 9.58 3.40 - 10.80 10*3/mm3    RBC 6.36 (H) 4.14 - 5.80 10*6/mm3    Hemoglobin 17.1 13.0 - 17.7 g/dL    Hematocrit 54.1 (H) 37.5 - 51.0 %    MCV 85.1 79.0 - 97.0 fL    MCH 26.9 26.6 - 33.0 pg    MCHC 31.6 31.5 - 35.7 g/dL    RDW 14.6 12.3 - 15.4 %    RDW-SD 43.5 37.0 - 54.0 fl    MPV 10.4 6.0 - 12.0 fL    Platelets 261 140 - 450 10*3/mm3    Neutrophil % 73.2 42.7 - 76.0 %    Lymphocyte % 18.1 (L) 19.6 - 45.3 %    Monocyte % 7.3 5.0 - 12.0 %    Eosinophil % 0.6 0.3 - 6.2 %    Basophil % 0.5 0.0 - 1.5 %    Immature Grans % 0.3 0.0 - 0.5 %    Neutrophils, Absolute 7.01 (H) 1.70 - 7.00 10*3/mm3    Lymphocytes, Absolute 1.73 0.70 - 3.10 10*3/mm3    Monocytes, Absolute 0.70 0.10 - 0.90 10*3/mm3    Eosinophils, Absolute 0.06 0.00 - 0.40 10*3/mm3    Basophils, Absolute 0.05 0.00 - 0.20 10*3/mm3    Immature Grans, Absolute 0.03 0.00 - 0.05 10*3/mm3    nRBC 0.0 0.0 - 0.2 /100 WBC   D-dimer, Quantitative    Specimen: Blood   Result Value Ref Range    D-Dimer, Quantitative 0.26 0.00 - 0.50 mg/L (FEU)   Troponin    Specimen: Blood   Result Value Ref Range    Troponin T 0.086 (C) 0.000 - 0.030 ng/mL   Light Blue Top   Result Value Ref Range    Extra Tube hold for add-on    Green Top (Gel)   Result Value Ref Range    Extra Tube Hold for add-ons.    Lavender Top   Result Value Ref Range    Extra Tube hold for add-on    Red Top   Result Value Ref Range    Extra Tube Hold for add-ons.        Diagnoses and all orders for this visit:    1. Bronchitis (Primary)  -      Ambulatory Referral to Internal Medicine    Other orders  -     predniSONE (DELTASONE) 20 MG tablet; Prednisone 20mg tabs, 3 for 2 days, 2 for 2 days, 1 for 2 days, 1/2 for 2 days take with food or milk  Dispense: 13 tablet; Refill: 0  -     promethazine-dextromethorphan (PROMETHAZINE-DM) 6.25-15 MG/5ML syrup; Take 5 mL by mouth At Night As Needed for Cough.  Dispense: 118 mL; Refill: 0  -     albuterol sulfate  (90 Base) MCG/ACT inhaler; Inhale 2 puffs Every 4 (Four) Hours As Needed for Wheezing.  Dispense: 18 g; Refill: 0    Take prednisone with food as early in the day as possible  Do not take prednisone with nsaids such as ibuprofen, aleve, or aspirin  May take tylenol for pain or fever  Continue Mucinex with plenty of fluids especially water to thin secretions and help with congestion.  Albuterol inhaler as needed for wheezing  Do not drive after taking promethazine DM as it may make you drowsy  Primary Care referral ordered for you. Make an appointment as soon as possible    FOLLOW-UP  If symptoms worsen or persist follow up with PCP.Virtual Care or Urgent Care  Follow up with primary care provider as soon as possible    Patient verbalizes understanding of medication dosage, comfort measures, instructions for treatment and follow-up.    KARLA Seo  06/18/2022  08:59 EDT    The use of a video visit has been reviewed with the patient and verbal informed consent has been obtained. Myself and Marlon Mccarthy participated in this visit. The patient is located in [address] [place] [address].    I am located in [place], [place]. Mychart and Zoom were utilized. I spent 25 minutes in the patient's chart for this visit.

## 2022-07-06 ENCOUNTER — TELEMEDICINE (OUTPATIENT)
Dept: FAMILY MEDICINE CLINIC | Facility: TELEHEALTH | Age: 42
End: 2022-07-06

## 2022-07-06 DIAGNOSIS — R06.2 WHEEZING: Primary | ICD-10-CM

## 2022-07-06 PROCEDURE — 99213 OFFICE O/P EST LOW 20 MIN: CPT | Performed by: NURSE PRACTITIONER

## 2022-07-06 NOTE — PATIENT INSTRUCTIONS
Restart your albuterol inhaler every 4-6 hours when wheezing  If symptoms persist longer than 2 weeks follow up  If you develop shortness of breath or chest pain, go to the emergency department     Bronchospasm, Adult    Bronchospasm is when the small airways in the lungs narrow. This can make it very hard to breathe. Swelling and more mucus than normal can add to this problem.  What are the causes?  Common causes of this condition include:  Having a cold.  Exercise.  The smell from sprays, perfumes, candles, and .  Cold air.  Stress, laughing, or crying.  What increases the risk?  Having asthma.  Smoking.  Having allergies.  Being allergic to certain foods, medicine, or bug bites or stings.  What are the signs or symptoms?  Symptoms of this condition include:  Making whistling sounds when you breathe (wheezing).  Coughing.  A tight feeling in your chest.  Feeling like you cannot catch your breath.  Feeling like you have no energy to exercise.  Breathing that is noisy.  A cough that has a high pitch.  How is this treated?  This condition may be treated by:  Using medicines that you breathe in. These open up the airways and help you breathe. Medicines can be taken with a metered dose inhaler or a nebulizer device.  Taking medicines to reduce swelling.  Getting rid of what started the bronchospasm.  Follow these instructions at home:  Medicines  Take over-the-counter and prescription medicines only as told by your doctor.  If you need to use an inhaler or nebulizer to take your medicine, ask your doctor how to use it.  You may be given a spacer to use with your inhaler. This makes it easier to get the medicine from the inhaler into your lungs.  Lifestyle  Do not use any products that have nicotine or tobacco. This includes cigarettes, e-cigarettes, and chewing tobacco. If you need help quitting, ask your doctor.  Keep track of things that start your bronchospasm. Avoid these if you are able to.  When pollen,  air pollution, or humidity is bad, keep windows closed. Use an air conditioner if you have one.  Find ways to cope with stress and your feelings.  Activity  Some people have bronchospasm when they exercise. This is called exercise-induced bronchoconstriction (EIB). If you have this problem, talk with your doctor about how to deal with EIB. Some tips include:  Use your inhaler before exercise.  Exercise indoors if it is very cold, humid, or the pollen and mold count is high.  Warm up and cool down before and after exercise.  Stop your exercise right away if your symptoms start or get worse.  General instructions  If you have asthma, make sure you have an asthma action plan.  Stay up to date on your shots (immunizations).  Keep all follow-up visits as told by your doctor. This is important.  Get help right away if:  You have trouble breathing.  You wheeze and cough and this does not get better after you take medicine.  You have chest pain.  You have trouble speaking more than one word in a sentence.  These symptoms may be an emergency. Do not wait to see if the symptoms will go away. Get medical help right away. Call your local emergency services (911 in the [place] Do not drive yourself to the hospital.  Summary  Bronchospasm is when the small airways in the lungs narrow. Swelling and more mucus than normal can add to this problem. This can make it very hard to breathe.  Get help right away if you wheeze and cough and this does not get better after you take medicine.  Do not use any products that have nicotine or tobacco. This includes cigarettes, e-cigarettes, and chewing tobacco.  This information is not intended to replace advice given to you by your health care provider. Make sure you discuss any questions you have with your health care provider.  Document Revised: 01/26/2021 Document Reviewed: 01/26/2021  Elsevier Patient Education © 2021 [employer]

## 2022-07-06 NOTE — PROGRESS NOTES
"CHIEF COMPLAINT  Chief Complaint   Patient presents with   • Wheezing         HPI  Marlon Mccarthy is a 42 y.o. male  presents with complaint of continued wheezing. The cough is almost gone. When he is \"out and about\" he does not \"feel it.\" He is not using the albuterol inhaler.     Review of Systems   Constitutional: Negative for chills, diaphoresis, fatigue and fever.   HENT: Negative.    Respiratory: Positive for cough (mild occastional ). Negative for chest tightness, shortness of breath and wheezing.    Cardiovascular: Negative for chest pain.       Past Medical History:   Diagnosis Date   • Diverticulitis of colon    • Hypertension    • Non morbid obesity due to excess calories 5/16/2017   • SVT (supraventricular tachycardia) ([facility])        Family History   Problem Relation Age of Onset   • Hypertension Mother    • Hypertension Father    • No Known Problems Sister    • No Known Problems Maternal Grandmother    • No Known Problems Maternal Grandfather    • No Known Problems Paternal Grandmother    • No Known Problems Paternal Grandfather    • No Known Problems Sister        Social History     Socioeconomic History   • Marital status:    Tobacco Use   • Smoking status: Never Smoker   • Smokeless tobacco: Never Used   Substance and Sexual Activity   • Alcohol use: Yes     Alcohol/week: 7.0 standard drinks     Types: 7 Cans of beer per week   • Drug use: No       Marlon Mccarthy  reports that he has never smoked. He has never used smokeless tobacco.         There were no vitals taken for this visit.    PHYSICAL EXAM  Virtual Visit Physical Exam      Diagnoses and all orders for this visit:    1. Wheezing (Primary)    Restart albuterol inhaler.     The use of a video visit has been reviewed with the patient and verbal informd consent has een obtained. Myself and Marlon Mccarthy participated in this visit. The patient is located in [address] [place] [address]. I am located in [place], [place]. " Mychart and Zoom were utilized. I spent 11 minutes in the patient's chart for this visit.           Ofelia Omalley, KARLA  07/06/2022  08:45 EDT

## 2022-07-16 ENCOUNTER — TELEMEDICINE (OUTPATIENT)
Dept: FAMILY MEDICINE CLINIC | Facility: TELEHEALTH | Age: 42
End: 2022-07-16

## 2022-07-16 VITALS — WEIGHT: 315 LBS | BODY MASS INDEX: 37.19 KG/M2 | HEIGHT: 77 IN

## 2022-07-16 DIAGNOSIS — Z86.79 HISTORY OF HYPERTENSION: ICD-10-CM

## 2022-07-16 DIAGNOSIS — E66.01 MORBID OBESITY: ICD-10-CM

## 2022-07-16 DIAGNOSIS — R06.2 WHEEZING: Primary | ICD-10-CM

## 2022-07-16 DIAGNOSIS — Z86.39 HISTORY OF ELEVATED GLUCOSE: ICD-10-CM

## 2022-07-16 NOTE — PROGRESS NOTES
You have chosen to receive care through a telehealth visit.  Do you consent to use a video/audio connection for your medical care today? Yes     CHIEF COMPLAINT  Cc: wheezing    HPI  Marlon Mccarthy is a 42 y.o. male  presents with complaint of wheezing. This has been going on since the middle of June.  He was treated for bronchitis 06/2022. He was treated with prednisone, albuterol inhaler and promethazine DM for cough. He also took Mucinex. He had some improvement. Then 07/06/2022 he was again seen via [facility] for wheezing and refill was provided on the albuterol inhaler. He presents today with intermittent wheezing not present at this moment. It is noted in his chart that he was in the ER in 2020 and his glucose was elevated. In 2019 he had a hgbA1c of 6.9. He was on cozaar and hctz in 2019 which he does not have any more.  He reports that after watching his wife pass that he is afraid to go to the doctor and has not seen his primary care provider. He reports a lack of a support system.    Review of Systems   Constitutional: Negative for fatigue and fever.   HENT: Positive for postnasal drip. Negative for congestion, rhinorrhea, sinus pressure, sinus pain and sore throat.    Respiratory: Positive for cough (with wheeze) and wheezing. Negative for chest tightness and shortness of breath.    Cardiovascular: Negative for chest pain.   Gastrointestinal: Negative for diarrhea and nausea.   Musculoskeletal: Negative for myalgias.   Neurological: Negative for headaches.   Psychiatric/Behavioral: The patient is nervous/anxious.        Past Medical History:   Diagnosis Date   • Diverticulitis of colon    • Hypertension    • Non morbid obesity due to excess calories 5/16/2017   • SVT (supraventricular tachycardia) (HCC)        Family History   Problem Relation Age of Onset   • Hypertension Mother    • Hypertension Father    • No Known Problems Sister    • No Known Problems Maternal Grandmother    • No Known  "Problems Maternal Grandfather    • No Known Problems Paternal Grandmother    • No Known Problems Paternal Grandfather    • No Known Problems Sister        Social History     Socioeconomic History   • Marital status:    Tobacco Use   • Smoking status: Never Smoker   • Smokeless tobacco: Never Used   Substance and Sexual Activity   • Alcohol use: Yes     Alcohol/week: 7.0 standard drinks     Types: 7 Cans of beer per week   • Drug use: No       Marlon Mccarthy  reports that he has never smoked. He has never used smokeless tobacco..        Ht 195.6 cm (77\")   Wt (!) 212 kg (467 lb)   BMI 55.38 kg/m²     PHYSICAL EXAM  Physical Exam   Constitutional: He is oriented to person, place, and time. He appears well-developed and well-nourished.   HENT:   Head: Normocephalic and atraumatic.   Right Ear: External ear normal.   Left Ear: External ear normal.   Nose: Nose normal.   Mouth/Throat: Mouth/Lips are normal.  Eyes: Lids are normal. Right eye exhibits no discharge and no exudate. Left eye exhibits no discharge and no exudate. Right conjunctiva is not injected. Left conjunctiva is not injected.   Pulmonary/Chest: No accessory muscle usage. No tachypnea and no bradypnea.  No respiratory distress.No use of oxygen by nasal cannulaNo use of oxygen by mask noted.  Abdominal: Abdomen appears normal.   Neurological: He is alert and oriented to person, place, and time. No cranial nerve deficit.   Skin: His skin appears normal.  Psychiatric: He has a normal mood and affect. His speech is normal and behavior is normal. Judgment and thought content normal.       Results for orders placed or performed during the hospital encounter of 04/21/20   Comprehensive Metabolic Panel    Specimen: Blood   Result Value Ref Range    Glucose 200 (H) 65 - 99 mg/dL    BUN 8 6 - 20 mg/dL    Creatinine 0.65 (L) 0.76 - 1.27 mg/dL    Sodium 139 136 - 145 mmol/L    Potassium 3.6 3.5 - 5.2 mmol/L    Chloride 102 98 - 107 mmol/L    CO2 22.0 22.0 - " 29.0 mmol/L    Calcium 9.5 8.6 - 10.5 mg/dL    Total Protein 8.7 (H) 6.0 - 8.5 g/dL    Albumin 3.90 3.50 - 5.20 g/dL    ALT (SGPT) 50 (H) 1 - 41 U/L    AST (SGOT) 51 (H) 1 - 40 U/L    Alkaline Phosphatase 110 39 - 117 U/L    Total Bilirubin 0.3 0.2 - 1.2 mg/dL    eGFR  African Amer >150 >60 mL/min/1.73    Globulin 4.8 gm/dL    A/G Ratio 0.8 g/dL    BUN/Creatinine Ratio 12.3 7.0 - 25.0    Anion Gap 15.0 5.0 - 15.0 mmol/L   Troponin    Specimen: Blood   Result Value Ref Range    Troponin T 0.012 0.000 - 0.030 ng/mL   Troponin    Specimen: Blood   Result Value Ref Range    Troponin T 0.068 (C) 0.000 - 0.030 ng/mL   CBC Auto Differential    Specimen: Blood   Result Value Ref Range    WBC 9.58 3.40 - 10.80 10*3/mm3    RBC 6.36 (H) 4.14 - 5.80 10*6/mm3    Hemoglobin 17.1 13.0 - 17.7 g/dL    Hematocrit 54.1 (H) 37.5 - 51.0 %    MCV 85.1 79.0 - 97.0 fL    MCH 26.9 26.6 - 33.0 pg    MCHC 31.6 31.5 - 35.7 g/dL    RDW 14.6 12.3 - 15.4 %    RDW-SD 43.5 37.0 - 54.0 fl    MPV 10.4 6.0 - 12.0 fL    Platelets 261 140 - 450 10*3/mm3    Neutrophil % 73.2 42.7 - 76.0 %    Lymphocyte % 18.1 (L) 19.6 - 45.3 %    Monocyte % 7.3 5.0 - 12.0 %    Eosinophil % 0.6 0.3 - 6.2 %    Basophil % 0.5 0.0 - 1.5 %    Immature Grans % 0.3 0.0 - 0.5 %    Neutrophils, Absolute 7.01 (H) 1.70 - 7.00 10*3/mm3    Lymphocytes, Absolute 1.73 0.70 - 3.10 10*3/mm3    Monocytes, Absolute 0.70 0.10 - 0.90 10*3/mm3    Eosinophils, Absolute 0.06 0.00 - 0.40 10*3/mm3    Basophils, Absolute 0.05 0.00 - 0.20 10*3/mm3    Immature Grans, Absolute 0.03 0.00 - 0.05 10*3/mm3    nRBC 0.0 0.0 - 0.2 /100 WBC   D-dimer, Quantitative    Specimen: Blood   Result Value Ref Range    D-Dimer, Quantitative 0.26 0.00 - 0.50 mg/L (FEU)   Troponin    Specimen: Blood   Result Value Ref Range    Troponin T 0.086 (C) 0.000 - 0.030 ng/mL   Light Blue Top   Result Value Ref Range    Extra Tube hold for add-on    Green Top (Gel)   Result Value Ref Range    Extra Tube Hold for add-ons.     Lavender Top   Result Value Ref Range    Extra Tube hold for add-on    Red Top   Result Value Ref Range    Extra Tube Hold for add-ons.        Diagnoses and all orders for this visit:    1. Wheezing (Primary)    2. History of elevated glucose    3. Morbid obesity (HCC)    4. History of hypertension    This patient has been advised to see his primary care provider for a full work up  Patient reports that he does have a primary care provider  Patient anxiety discussed, Risks and benefits and importantce of seeing his primary care provider discussed with this patient    FOLLOW-UP  Follow up with primary care provider    If symptoms worsen or persist follow up with ER    Patient verbalizes understanding of medication dosage, comfort measures, instructions for treatment and follow-up.    Elizabeth Yepez, KARLA  07/16/2022  03:21 EDT    The use of a video visit has been reviewed with the patient and verbal informed consent has been obtained. Myself and Marlon Mccarthy participated in this visit. The patient is located in [address] [place] [address].    I am located in [place], [place]. Mychart and Zoom were utilized. I spent 20 minutes in the patient's chart for this visit.

## 2022-07-17 ENCOUNTER — HOSPITAL ENCOUNTER (OUTPATIENT)
Dept: GENERAL RADIOLOGY | Facility: HOSPITAL | Age: 42
Discharge: HOME | End: 2022-07-17
Admitting: NURSE PRACTITIONER

## 2022-07-17 PROCEDURE — 85025 COMPLETE CBC W/AUTO DIFF WBC: CPT | Performed by: NURSE PRACTITIONER

## 2022-07-17 PROCEDURE — 82550 ASSAY OF CK (CPK): CPT | Performed by: NURSE PRACTITIONER

## 2022-07-17 PROCEDURE — 80053 COMPREHEN METABOLIC PANEL: CPT | Performed by: NURSE PRACTITIONER

## 2022-07-17 PROCEDURE — 71046 X-RAY EXAM CHEST 2 VIEWS: CPT

## 2022-07-17 PROCEDURE — 83036 HEMOGLOBIN GLYCOSYLATED A1C: CPT | Performed by: NURSE PRACTITIONER

## 2022-08-01 ENCOUNTER — OFFICE VISIT (OUTPATIENT)
Dept: INTERNAL MEDICINE | Facility: CLINIC | Age: 42
End: 2022-08-01

## 2022-08-01 VITALS
DIASTOLIC BLOOD PRESSURE: 88 MMHG | BODY MASS INDEX: 37.19 KG/M2 | WEIGHT: 315 LBS | HEART RATE: 106 BPM | OXYGEN SATURATION: 96 % | HEIGHT: 77 IN | SYSTOLIC BLOOD PRESSURE: 158 MMHG

## 2022-08-01 DIAGNOSIS — G47.9 DIFFICULTY SLEEPING: ICD-10-CM

## 2022-08-01 DIAGNOSIS — E66.01 MORBID OBESITY DUE TO EXCESS CALORIES: ICD-10-CM

## 2022-08-01 DIAGNOSIS — I10 BENIGN ESSENTIAL HTN: Primary | ICD-10-CM

## 2022-08-01 DIAGNOSIS — R73.03 PREDIABETES: ICD-10-CM

## 2022-08-01 DIAGNOSIS — F43.21 FEELING GRIEF: ICD-10-CM

## 2022-08-01 PROCEDURE — 99214 OFFICE O/P EST MOD 30 MIN: CPT | Performed by: NURSE PRACTITIONER

## 2022-08-01 RX ORDER — TRAZODONE HYDROCHLORIDE 50 MG/1
50 TABLET ORAL NIGHTLY
Qty: 30 TABLET | Refills: 1 | Status: SHIPPED | OUTPATIENT
Start: 2022-08-01 | End: 2022-09-23 | Stop reason: SDUPTHER

## 2022-08-01 RX ORDER — CARVEDILOL 6.25 MG/1
6.25 TABLET ORAL 2 TIMES DAILY WITH MEALS
Qty: 60 TABLET | Refills: 1 | Status: SHIPPED | OUTPATIENT
Start: 2022-08-01 | End: 2022-09-01 | Stop reason: SDUPTHER

## 2022-08-01 NOTE — PROGRESS NOTES
Chief Complaint   Patient presents with   • Establish Care   • Hypertension         History:  Marlon Mccarthy is a 42 y.o. male who presents today for evaluation of the above problems.          Establish care.    Hypertension, labs recently done showed A1C 6.4%. Just started Hyzaar from  7/17/22. Was on losartan alone before.  Mom has diabetes.  Working on low-sugar and carbs in the last few weeks AND has joined the gym! Is walking on treadmill. Has a diet miah.    Bronchitis May, still occasionally feels a wheeze in the chest.    Has had acid reflux before, not bothering now.    Wife of 22 years passed away on Mother's Day and has been functional but very sad at night. Works at [employer].    Resting pulse at home 85-89. Does not drink caffeine. Used to be on Coreg without problems, just stopped.    Not sleeping well due to sadness about being alone at night.  Talking to  regularly and doing group therapy as well. Denies suicidal ideation.      ROS:  Review of Systems  As above    Allergies   Allergen Reactions   • Lisinopril Cough     Past Medical History:   Diagnosis Date   • Diverticulitis of colon    • Hypertension    • Non morbid obesity due to excess calories 5/16/2017   • SVT (supraventricular tachycardia) (HCC)      Past Surgical History:   Procedure Laterality Date   • INGUINAL HERNIA REPAIR       Family History   Problem Relation Age of Onset   • Diabetes Mother    • Hypertension Mother    • Hypertension Father    • No Known Problems Sister    • No Known Problems Sister    • No Known Problems Maternal Grandmother    • No Known Problems Maternal Grandfather    • No Known Problems Paternal Grandmother    • No Known Problems Paternal Grandfather      Marlon  reports that he has never smoked. He has never used smokeless tobacco. He reports current alcohol use of about 7.0 standard drinks of alcohol per week. He reports that he does not use drugs.    I have reviewed and updated the above documentation (if  "necessary) including but not limited to chief complaint, ROS, PFSH, allergies and medications        Current Outpatient Medications:   •  albuterol sulfate  (90 Base) MCG/ACT inhaler, Inhale 2 puffs Every 4 (Four) Hours As Needed for Wheezing., Disp: 18 g, Rfl: 0  •  losartan-hydrochlorothiazide (HYZAAR) 50-12.5 MG per tablet, Take 1 tablet by mouth Daily., Disp: 30 tablet, Rfl: 0  •  carvedilol (Coreg) 6.25 MG tablet, Take 1 tablet by mouth 2 (Two) Times a Day With Meals., Disp: 60 tablet, Rfl: 1  •  traZODone (DESYREL) 50 MG tablet, Take 1 tablet by mouth Every Night., Disp: 30 tablet, Rfl: 1    OBJECTIVE:  Visit Vitals  /88 (BP Location: Right arm, Patient Position: Sitting, Cuff Size: Adult)   Pulse 106   Ht 195.6 cm (77\")   Wt (!) 230 kg (508 lb) Comment: pt reported   SpO2 96%   BMI 60.24 kg/m²        PHQ-9 Depression Screening  Little interest or pleasure in doing things? 1-->several days   Feeling down, depressed, or hopeless? 2-->more than half the days   Trouble falling or staying asleep, or sleeping too much? 2-->more than half the days   Feeling tired or having little energy? 0-->not at all   Poor appetite or overeating? 3-->nearly every day   Feeling bad about yourself - or that you are a failure or have let yourself or your family down? 3-->nearly every day   Trouble concentrating on things, such as reading the newspaper or watching television? 0-->not at all   Moving or speaking so slowly that other people could have noticed? Or the opposite - being so fidgety or restless that you have been moving around a lot more than usual? 0-->not at all   Thoughts that you would be better off dead, or of hurting yourself in some way? 0-->not at all   PHQ-9 Total Score 11   If you checked off any problems, how difficult have these problems made it for you to do your work, take care of things at home, or get along with other people? not difficult at all       Physical Exam  Vitals and nursing note " reviewed.   Constitutional:       General: He is not in acute distress.     Appearance: Normal appearance. He is not ill-appearing, toxic-appearing or diaphoretic.   HENT:      Head: Normocephalic and atraumatic.   Eyes:      General: No scleral icterus.     Conjunctiva/sclera: Conjunctivae normal.   Cardiovascular:      Rate and Rhythm: Normal rate and regular rhythm.   Pulmonary:      Effort: Pulmonary effort is normal. No respiratory distress.      Breath sounds: Normal breath sounds. No wheezing.   Abdominal:      General: There is no distension.      Palpations: Abdomen is soft.      Tenderness: There is no abdominal tenderness.   Musculoskeletal:      Cervical back: Neck supple.      Right lower leg: Edema (large, with stasis changes, no pitting) present.      Left lower leg: No edema (large, with stasis changes, no pitting).   Skin:     General: Skin is dry.   Neurological:      Mental Status: He is alert and oriented to person, place, and time.   Psychiatric:         Mood and Affect: Mood normal.         Behavior: Behavior normal.         Thought Content: Thought content normal.         Judgment: Judgment normal.         [facility]    Assessment/Plan    Diagnoses and all orders for this visit:    1. Benign essential HTN (Primary)  -     carvedilol (Coreg) 6.25 MG tablet; Take 1 tablet by mouth 2 (Two) Times a Day With Meals.  Dispense: 60 tablet; Refill: 1    2. Morbid obesity due to excess calories (HCC)    3. Prediabetes    4. Difficulty sleeping  -     traZODone (DESYREL) 50 MG tablet; Take 1 tablet by mouth Every Night.  Dispense: 30 tablet; Refill: 1    5. Feeling grief    Add Coreg to Hyzaar.  Blood pressure is not well-controlled, and his pulse is high.  I have reviewed ECG in chart.    Class 3 Severe Obesity (BMI >=40). Obesity-related health conditions include the following: hypertension and diabetes mellitus. Obesity is newly identified. BMI is is above average; BMI management plan is completed. We  discussed low calorie, low carb based diet program, portion control and increasing exercise. He defers referral to bariatrics at this time but will consider.    No meds for prediabetes at this time but encouraged his new efforts at joining the gym and watching sugar in diet.      Trial of Desyrel to help with grief/depressive symptoms and sleep.  I also suspect he could have sleep apnea as he reports daytime sleepiness during conversation.  He defers referral for sleep study at this time.      Education materials and an After Visit Summary were printed and given to the patient at discharge.  Return in about 1 month (around 9/1/2022) for Recheck blood pressure with Dr. Sawyer and PHQ-9.         KARLA Infante   10:21 CDT  8/1/2022

## 2022-08-18 ENCOUNTER — PATIENT MESSAGE (OUTPATIENT)
Dept: INTERNAL MEDICINE | Facility: CLINIC | Age: 42
End: 2022-08-18

## 2022-08-18 DIAGNOSIS — I10 BENIGN ESSENTIAL HTN: ICD-10-CM

## 2022-08-18 RX ORDER — LOSARTAN POTASSIUM AND HYDROCHLOROTHIAZIDE 12.5; 5 MG/1; MG/1
1 TABLET ORAL DAILY
Qty: 30 TABLET | Refills: 0 | Status: SHIPPED | OUTPATIENT
Start: 2022-08-18 | End: 2022-09-23 | Stop reason: SDUPTHER

## 2022-08-25 DIAGNOSIS — I10 BENIGN ESSENTIAL HTN: ICD-10-CM

## 2022-08-25 DIAGNOSIS — G47.9 DIFFICULTY SLEEPING: ICD-10-CM

## 2022-08-25 RX ORDER — CARVEDILOL 6.25 MG/1
6.25 TABLET ORAL 2 TIMES DAILY WITH MEALS
Qty: 60 TABLET | Refills: 1 | OUTPATIENT
Start: 2022-08-25

## 2022-08-25 RX ORDER — TRAZODONE HYDROCHLORIDE 50 MG/1
50 TABLET ORAL NIGHTLY
Qty: 30 TABLET | Refills: 1 | OUTPATIENT
Start: 2022-08-25

## 2022-08-25 RX ORDER — LOSARTAN POTASSIUM AND HYDROCHLOROTHIAZIDE 12.5; 5 MG/1; MG/1
1 TABLET ORAL DAILY
Qty: 30 TABLET | Refills: 0 | OUTPATIENT
Start: 2022-08-25

## 2022-09-01 ENCOUNTER — OFFICE VISIT (OUTPATIENT)
Dept: INTERNAL MEDICINE | Facility: CLINIC | Age: 42
End: 2022-09-01

## 2022-09-01 VITALS
SYSTOLIC BLOOD PRESSURE: 142 MMHG | TEMPERATURE: 98.5 F | WEIGHT: 315 LBS | DIASTOLIC BLOOD PRESSURE: 80 MMHG | BODY MASS INDEX: 37.19 KG/M2 | HEART RATE: 90 BPM | OXYGEN SATURATION: 96 % | RESPIRATION RATE: 15 BRPM | HEIGHT: 77 IN

## 2022-09-01 DIAGNOSIS — R73.03 PREDIABETES: ICD-10-CM

## 2022-09-01 DIAGNOSIS — I10 BENIGN ESSENTIAL HTN: Primary | ICD-10-CM

## 2022-09-01 DIAGNOSIS — E66.01 MORBID OBESITY DUE TO EXCESS CALORIES: ICD-10-CM

## 2022-09-01 PROCEDURE — 99214 OFFICE O/P EST MOD 30 MIN: CPT | Performed by: INTERNAL MEDICINE

## 2022-09-01 RX ORDER — CARVEDILOL 12.5 MG/1
12.5 TABLET ORAL 2 TIMES DAILY WITH MEALS
Qty: 60 TABLET | Refills: 2 | Status: SHIPPED | OUTPATIENT
Start: 2022-09-01

## 2022-09-01 NOTE — PROGRESS NOTES
Chief Complaint   Patient presents with   • Hypertension   • Follow-up       Answers for HPI/ROS submitted by the patient on 8/27/2022  What is the primary reason for your visit?: High Blood Pressure      History:  Marlon Mccarthy is a 42 y.o. male who presents today for evaluation of the above problems.      1 month follow-up for hypertension and prediabetes and morbid obesity.  Reviewed Allie Araiza's office note from August 1, 2022.  Coreg was added to his regimen, and trazodone 50 mg was added at night for insomnia.  He has adjusted his lifestyle and is increasing exercise and decreasing carbohydrates.    Blood pressure is improved at 142/80 and his heart rate is 98.    Blood pressure has been elevated at home as well.  Last night it was 159/81 but usually has been around 140/85.  He takes Hyzaar 50-12.5.    He is exercising at [employer].  He is watching his salt intake but not yet watching his carbohydrates.  He is trying to limit himself to no more than 2700 guerrero/day.  He tends to not eat much throughout the day and eats all of his calories at nighttime right before bed.  He is trying to do a better job avoiding this.    His sleep is better with trazodone 50 mg.  He denies any snoring.  He does not believe he has sleep apnea    This past Monday while doing yard work he pulled a muscle in his left lower back.  Has fairly mild symptoms and has not taken any medication yet for them.    He has left greater than right ulnar neuropathy symptoms.  Seems to worsen when he lays on his stomach with arms drawn up to his chest.  He is trying to avoid this.  We discussed wearing wrist brace at night, trying to keep his arm straight, and as needed ibuprofen both for his back and the ulnar nerve irritation.  If symptoms are not improved in 1 month we can obtain nerve conduction study and EMG.    Hypertension  This is a recurrent problem. The current episode started more than 1 year ago. The problem has been waxing and  waning since onset. The problem is controlled. Associated symptoms include anxiety. Pertinent negatives include no blurred vision, chest pain, headaches, malaise/fatigue, neck pain, orthopnea, palpitations, peripheral edema, PND, shortness of breath or sweats. There are no associated agents to hypertension. Risk factors for coronary artery disease include obesity, sedentary lifestyle and stress. Compliance problems include diet, exercise and psychosocial issues.          ROS:  Review of Systems   Constitutional: Negative for malaise/fatigue.   Eyes: Negative for blurred vision.   Respiratory: Negative for shortness of breath.    Cardiovascular: Negative for chest pain, palpitations, orthopnea and PND.   Musculoskeletal: Negative for neck pain.   Neurological: Negative for headaches.         Current Outpatient Medications:   •  albuterol sulfate  (90 Base) MCG/ACT inhaler, Inhale 2 puffs Every 4 (Four) Hours As Needed for Wheezing., Disp: 18 g, Rfl: 0  •  carvedilol (Coreg) 12.5 MG tablet, Take 1 tablet by mouth 2 (Two) Times a Day With Meals., Disp: 60 tablet, Rfl: 2  •  losartan-hydrochlorothiazide (HYZAAR) 50-12.5 MG per tablet, Take 1 tablet by mouth Daily., Disp: 30 tablet, Rfl: 0  •  traZODone (DESYREL) 50 MG tablet, Take 1 tablet by mouth Every Night., Disp: 30 tablet, Rfl: 1    Lab Results   Component Value Date    GLUCOSE 118 (H) 07/17/2022    BUN 6 07/17/2022    CREATININE 0.55 (L) 07/17/2022    EGFRIFNONA 113 05/16/2017    EGFRIFAFRI >150 04/21/2020    BCR 10.9 07/17/2022    K 3.4 (L) 07/17/2022    CO2 25.0 07/17/2022    CALCIUM 9.2 07/17/2022    PROTENTOTREF 8.2 05/16/2017    ALBUMIN 3.80 07/17/2022    LABIL2 1.0 (L) 05/16/2017    AST 36 07/17/2022    ALT 35 07/17/2022       WBC   Date Value Ref Range Status   07/17/2022 9.40 3.40 - 10.80 10*3/mm3 Final     RBC   Date Value Ref Range Status   07/17/2022 5.80 4.14 - 5.80 10*6/mm3 Final     Hemoglobin   Date Value Ref Range Status   07/17/2022 16.2  13.0 - 17.7 g/dL Final     Hematocrit   Date Value Ref Range Status   07/17/2022 50.7 37.5 - 51.0 % Final     MCV   Date Value Ref Range Status   07/17/2022 87.4 79.0 - 97.0 fL Final     MCH   Date Value Ref Range Status   07/17/2022 27.9 26.6 - 33.0 pg Final     MCHC   Date Value Ref Range Status   07/17/2022 32.0 31.5 - 35.7 g/dL Final     RDW   Date Value Ref Range Status   07/17/2022 15.4 12.3 - 15.4 % Final     RDW-SD   Date Value Ref Range Status   07/17/2022 48.2 37.0 - 54.0 fl Final     MPV   Date Value Ref Range Status   07/17/2022 10.0 6.0 - 12.0 fL Final     Platelets   Date Value Ref Range Status   07/17/2022 237 140 - 450 10*3/mm3 Final     Neutrophil %   Date Value Ref Range Status   07/17/2022 69.1 42.7 - 76.0 % Final     Lymphocyte %   Date Value Ref Range Status   07/17/2022 20.5 19.6 - 45.3 % Final     Monocyte %   Date Value Ref Range Status   07/17/2022 6.9 5.0 - 12.0 % Final     Eosinophil %   Date Value Ref Range Status   07/17/2022 2.7 0.3 - 6.2 % Final     Basophil %   Date Value Ref Range Status   07/17/2022 0.4 0.0 - 1.5 % Final     Immature Grans %   Date Value Ref Range Status   07/17/2022 0.4 0.0 - 0.5 % Final     Neutrophils, Absolute   Date Value Ref Range Status   07/17/2022 6.49 1.70 - 7.00 10*3/mm3 Final     Lymphocytes, Absolute   Date Value Ref Range Status   07/17/2022 1.93 0.70 - 3.10 10*3/mm3 Final     Monocytes, Absolute   Date Value Ref Range Status   07/17/2022 0.65 0.10 - 0.90 10*3/mm3 Final     Eosinophils, Absolute   Date Value Ref Range Status   07/17/2022 0.25 0.00 - 0.40 10*3/mm3 Final     Basophils, Absolute   Date Value Ref Range Status   07/17/2022 0.04 0.00 - 0.20 10*3/mm3 Final     Immature Grans, Absolute   Date Value Ref Range Status   07/17/2022 0.04 0.00 - 0.05 10*3/mm3 Final     nRBC   Date Value Ref Range Status   07/17/2022 0.0 0.0 - 0.2 /100 WBC Final         OBJECTIVE:  Visit Vitals  /80 (BP Location: Left arm, Patient Position: Sitting, Cuff Size:  "Adult)   Pulse 90   Temp 98.5 °F (36.9 °C) (Oral)   Resp 15   Ht 195.6 cm (77.01\")   Wt (!) 230 kg (508 lb)   SpO2 96%   BMI 60.23 kg/m²      Physical Exam  Vitals and nursing note reviewed.   Constitutional:       General: He is not in acute distress.     Appearance: He is well-developed. He is not ill-appearing or toxic-appearing.      Comments: Pleasant     HENT:      Head: Normocephalic and atraumatic.   Eyes:      Pupils: Pupils are equal, round, and reactive to light.   Neck:      Thyroid: No thyromegaly.      Trachea: Phonation normal.   Pulmonary:      Effort: No respiratory distress.   Musculoskeletal:        Back:    Skin:     Coloration: Skin is not pale.      Findings: No erythema.   Neurological:      Mental Status: He is alert.   Psychiatric:         Behavior: Behavior normal.         Thought Content: Thought content normal.         Judgment: Judgment normal.         Assessment/Plan    Diagnoses and all orders for this visit:    1. Benign essential HTN (Primary)  -     carvedilol (Coreg) 12.5 MG tablet; Take 1 tablet by mouth 2 (Two) Times a Day With Meals.  Dispense: 60 tablet; Refill: 2    2. Morbid obesity due to excess calories (HCC)    3. Prediabetes    Increase carvedilol to 12.5 mg twice daily.  I feel like if we increase the Hyzaar his blood pressure may be to decrease too much     Commended him on his increased activity and weight loss.  I would also recommend he start watching his carbohydrates and decreasing them.     We discussed wearing wrist brace at night, trying to keep his arm straight, and as needed ibuprofen both for his back and the ulnar nerve irritation.  If symptoms are not improved in 1 month we can obtain nerve conduction study and EMG.    As needed ibuprofen for the mild back strain    34 minutes spent total on the day of the visit.    Return in about 3 months (around 12/1/2022) for Recheck with Dajuan Sawyer MD  10:10 CDT  9/1/2022   "

## 2022-09-23 DIAGNOSIS — G47.9 DIFFICULTY SLEEPING: ICD-10-CM

## 2022-09-23 DIAGNOSIS — I10 BENIGN ESSENTIAL HTN: ICD-10-CM

## 2022-09-23 RX ORDER — TRAZODONE HYDROCHLORIDE 50 MG/1
50 TABLET ORAL NIGHTLY
Qty: 30 TABLET | Refills: 5 | Status: SHIPPED | OUTPATIENT
Start: 2022-09-23

## 2022-09-23 RX ORDER — LOSARTAN POTASSIUM AND HYDROCHLOROTHIAZIDE 12.5; 5 MG/1; MG/1
1 TABLET ORAL DAILY
Qty: 30 TABLET | Refills: 5 | Status: SHIPPED | OUTPATIENT
Start: 2022-09-23

## 2022-10-28 ENCOUNTER — HOSPITAL ENCOUNTER (EMERGENCY)
Age: 42
Discharge: HOME | End: 2022-10-28
Attending: EMERGENCY MEDICINE

## 2022-10-28 VITALS
SYSTOLIC BLOOD PRESSURE: 155 MMHG | HEART RATE: 106 BPM | HEIGHT: 77 IN | WEIGHT: 315 LBS | BODY MASS INDEX: 37.19 KG/M2 | TEMPERATURE: 98.4 F | OXYGEN SATURATION: 95 % | RESPIRATION RATE: 22 BRPM | DIASTOLIC BLOOD PRESSURE: 99 MMHG

## 2022-10-28 DIAGNOSIS — I47.1 PAROXYSMAL SUPRAVENTRICULAR TACHYCARDIA (HCC): Primary | ICD-10-CM

## 2022-10-28 PROCEDURE — 99284 EMERGENCY DEPT VISIT MOD MDM: CPT

## 2022-10-28 RX ORDER — TRAZODONE HYDROCHLORIDE 50 MG/1
50 TABLET ORAL NIGHTLY
COMMUNITY
Start: 2022-09-23

## 2022-10-28 RX ORDER — CARVEDILOL 12.5 MG/1
12.5 TABLET ORAL 2 TIMES DAILY WITH MEALS
COMMUNITY
Start: 2022-09-01

## 2022-10-28 ASSESSMENT — ENCOUNTER SYMPTOMS
RHINORRHEA: 0
VOICE CHANGE: 0
COUGH: 0
EYE PAIN: 0
SHORTNESS OF BREATH: 0
ABDOMINAL PAIN: 0
VOMITING: 0
EYE REDNESS: 0
DIARRHEA: 0

## 2022-10-28 NOTE — ED PROVIDER NOTES
[facility] EMERGENCY DEPT  EMERGENCY DEPARTMENT ENCOUNTER      Pt Name: Shena Vitale  MRN: [id number]  [place] 1980  Date of evaluation: 10/28/2022  Provider: Lorena Aguirre MD    CHIEF COMPLAINT       Chief Complaint   Patient presents with    Tachycardia     Hx SVT         HISTORY OF PRESENT ILLNESS   (Location/Symptom, Timing/Onset,Context/Setting, Quality, Duration, Modifying Factors, Severity)  Note limiting factors. Shena Vitale is a 36 y.o. male who presents to the emergency department for evaluation after a presumed episode of Svt. Has had prior similar episodes in the past. Says it usually occurs about 3 times a year and he is normally able to get it to stop with vagal maneuvers. Says this is the  first time it's happened since his wife passed away 3 months ago and he got scared because no one was there with her. Says the episode lasted 10 minutes. Says he felt slightly short of breath during the episode, which is typical. Denies any pain or dizziness. says sxs start with feeling his ears are burning, then has palpitations and fatigue. Followed by Dr. Princess Espino at [address] [facility] carvedilol daily but didn't have chance to this morning. One prior episode required cardioversion in 2019, which was his first episode. HPI    NursingNotes were reviewed. REVIEW OF SYSTEMS    (2-9 systems for level 4, 10 or more for level 5)     Review of Systems   Constitutional:  Negative for fatigue and fever. HENT:  Negative for congestion, rhinorrhea and voice change. Eyes:  Negative for pain and redness. Respiratory:  Negative for cough and shortness of breath. Cardiovascular:  Positive for palpitations. Negative for chest pain. Gastrointestinal:  Negative for abdominal pain, diarrhea and vomiting. Endocrine: Negative. Genitourinary: Negative. Musculoskeletal:  Negative for arthralgias and gait problem. Skin:  Negative for rash and wound. Neurological:  Negative for weakness and headaches. Hematological: Negative. Psychiatric/Behavioral: Negative. All other systems reviewed and are negative. A complete review of systems was performed and is negative except as noted above in the HPI. PAST MEDICAL HISTORY     Past Medical History:   Diagnosis Date    Hypertension          SURGICAL HISTORY       Past Surgical History:   Procedure Laterality Date    COLONOSCOPY      TOENAIL EXCISION           CURRENT MEDICATIONS       Discharge Medication List as of 10/28/2022 10:15 AM        CONTINUE these medications which have NOT CHANGED    Details   carvedilol (COREG) 12.5 MG tablet Take 12.5 mg by mouth 2 times daily (with meals)Historical Med      traZODone (DESYREL) 50 MG tablet Take 50 mg by mouth nightlyHistorical Med      losartan-hydrochlorothiazide (HYZAAR) 50-12.5 MG per tablet Take 1 tablet by mouth daily, Disp-30 tablet, R-0             ALLERGIES     Lisinopril    FAMILY HISTORY     History reviewed. No pertinent family history. SOCIAL HISTORY       Social History     Socioeconomic History    Marital status:      Spouse name: None    Number of children: None    Years of education: None    Highest education level: None   Tobacco Use    Smoking status: Never    Smokeless tobacco: Never   Substance and Sexual Activity    Alcohol use: Yes     Alcohol/week: 6.0 standard drinks     Types: 6 Cans of beer per week     Comment: occasional    Drug use: No       SCREENINGS    Ashok Coma Scale  Eye Opening: Spontaneous  Best Verbal Response: Oriented  Best Motor Response: Obeys commands  Ashok Coma Scale Score: 15        PHYSICAL EXAM    (up to 7 for level 4, 8 or more for level 5)     ED Triage Vitals [10/28/22 0943]   BP Temp Temp Source Heart Rate Resp SpO2 Height Weight   (!) 155/99 98.4 °F (36.9 °C) Oral (!) 106 22 95 % 6' 5\" (1.956 m) (!) 508 lb (230.4 kg)       Physical Exam  Vitals and nursing note reviewed. Constitutional:       General: He is not in acute distress. Appearance: He is well-developed. He is obese. He is not diaphoretic. HENT:      Head: Normocephalic and atraumatic. Eyes:      General: No scleral icterus. Neck:      Vascular: No JVD. Cardiovascular:      Rate and Rhythm: Normal rate and regular rhythm. Pulses:           Radial pulses are 2+ on the right side and 2+ on the left side. Dorsalis pedis pulses are 2+ on the right side and 2+ on the left side. Heart sounds: Normal heart sounds. No murmur heard. No friction rub. No gallop. Pulmonary:      Effort: Pulmonary effort is normal. No accessory muscle usage or respiratory distress. Breath sounds: Normal breath sounds. No stridor. No decreased breath sounds, wheezing, rhonchi or rales. Chest:      Chest wall: No tenderness. Abdominal:      General: There is no distension. Palpations: Abdomen is soft. Tenderness: There is no abdominal tenderness. There is no guarding or rebound. Musculoskeletal:         General: No deformity. Normal range of motion. Right lower leg: No edema. Left lower leg: No edema. Skin:     General: Skin is warm and dry. Coloration: Skin is not pale. Findings: No erythema. Neurological:      Mental Status: He is alert and oriented to person, place, and time. GCS: GCS eye subscore is 4. GCS verbal subscore is 5. GCS motor subscore is 6. Cranial Nerves: No cranial nerve deficit. Motor: No abnormal muscle tone.       Coordination: Coordination normal.   Psychiatric:         Behavior: Behavior normal.         Judgment: Judgment normal.       DIAGNOSTIC RESULTS     EKG: All EKG's are interpreted by the Emergency Department Physician who either signs or Co-signs this chart in the absence of a cardiologist.        RADIOLOGY:   Non-plain film images such as CT, Ultrasound and MRI are read by the radiologist. Plainradiographic images are visualized and preliminarily interpreted by the emergency physician with the below findings:        Interpretation per the Radiologist below, if available at the time of this note:    No orders to display         ED BEDSIDE ULTRASOUND:   Performed by ED Physician - none    LABS:  Labs Reviewed - No data to display    All other labs were within normal range or not returned as of this dictation. EMERGENCY DEPARTMENT COURSE and DIFFERENTIALDIAGNOSIS/MDM:   Vitals:    Vitals:    10/28/22 0943   BP: (!) 155/99   Pulse: (!) 106   Resp: 22   Temp: 98.4 °F (36.9 °C)   TempSrc: Oral   SpO2: 95%   Weight: (!) 508 lb (230.4 kg)   Height: 6' 5\" (1.956 m)       [facility]    ED Course as of 10/28/22 1716   Fri Oct 28, 2022   1005 EKG shows sinus rhythm with a rate of 120 with frequent ectopic beats. Borderline T wave flattening diffusely, likely rate related. Normal QRS QTC [NAOMI]      ED Course User Index  [NAOMI] Mary Escobedo MD   On arrival here, pt in regular tachycardia. Converted with vagal maneuvers before rhythm strip obtained. Sxs resolved. Denies any other symptoms after converting. Does not want any labs or additional workup done at this time. CONSULTS:  None    PROCEDURES:  Unless otherwise notedbelow, none     Procedures    FINAL IMPRESSION     1. Paroxysmal supraventricular tachycardia [facility])          DISPOSITION/PLAN   DISPOSITION Decision To Discharge 10/28/2022 09:57:23 AM      No notes of EC Admission Criteria type on file.     PATIENT REFERRED TO:  [facility] EMERGENCY DEPT  Carson Dias  [phone]    If symptoms worsen    DISCHARGE MEDICATIONS:  Discharge Medication List as of 10/28/2022 10:15 AM             (Please note that portions of this note were completed with a voice recognition program.  Efforts were made to edit the dictations butoccasionally words are mis-transcribed.)    Mary Freeman MD (electronically signed)  Emergency Physician          Mary Escobedo MD  10/28/22 7763

## 2022-10-28 NOTE — ED TRIAGE NOTES
Pt put on HR, pt noted to be in SVT, connected EKG and pt converted to ST before EKG could be printed

## 2022-12-23 ENCOUNTER — TELEMEDICINE (OUTPATIENT)
Dept: FAMILY MEDICINE CLINIC | Facility: TELEHEALTH | Age: 42
End: 2022-12-23

## 2022-12-23 DIAGNOSIS — J06.9 ACUTE URI: Primary | ICD-10-CM

## 2022-12-23 PROCEDURE — 99213 OFFICE O/P EST LOW 20 MIN: CPT | Performed by: NURSE PRACTITIONER

## 2022-12-23 RX ORDER — IPRATROPIUM BROMIDE 42 UG/1
2 SPRAY, METERED NASAL 4 TIMES DAILY
Qty: 15 ML | Refills: 0 | Status: SHIPPED | OUTPATIENT
Start: 2022-12-23

## 2022-12-23 RX ORDER — PSEUDOEPHEDRINE HCL 30 MG
30 TABLET ORAL EVERY 4 HOURS PRN
Qty: 40 TABLET | Refills: 0 | Status: SHIPPED | OUTPATIENT
Start: 2022-12-23 | End: 2023-01-02

## 2022-12-24 NOTE — PROGRESS NOTES
You have chosen to receive care through a telehealth visit.  Do you consent to use a video/audio connection for your medical care today? Yes     CHIEF COMPLAINT  Chief Complaint   Patient presents with   • URI         HPI  Marlon Mccarthy is a 42 y.o. male  presents with complaint of post nasal drip.  Nasal congestion, cough for a couple of days.  He states that nasal mucus is clear.    Review of Systems   HENT: Positive for congestion, postnasal drip and rhinorrhea.    Respiratory: Positive for cough.        Past Medical History:   Diagnosis Date   • Diverticulitis of colon    • Hypertension    • Non morbid obesity due to excess calories 5/16/2017   • SVT (supraventricular tachycardia) ([facility])        Family History   Problem Relation Age of Onset   • Diabetes Mother    • Hypertension Mother    • Hypertension Father    • No Known Problems Sister    • No Known Problems Sister    • No Known Problems Maternal Grandmother    • No Known Problems Maternal Grandfather    • No Known Problems Paternal Grandmother    • No Known Problems Paternal Grandfather        Social History     Socioeconomic History   • Marital status:    Tobacco Use   • Smoking status: Never   • Smokeless tobacco: Never   Substance and Sexual Activity   • Alcohol use: Yes     Alcohol/week: 7.0 standard drinks     Types: 7 Cans of beer per week     Comment: social   • Drug use: No   • Sexual activity: Not Currently     Partners: Female     Birth control/protection: Condom       Marlon Mccarthy  reports that he has never smoked. He has never used smokeless tobacco..            There were no vitals taken for this visit.    PHYSICAL EXAM  Physical Exam   Constitutional: He appears well-developed and well-nourished.   HENT:   Head: Normocephalic.   Eyes: Pupils are equal, round, and reactive to light.   Pulmonary/Chest: Effort normal.   Musculoskeletal: Normal range of motion.   Neurological: He is alert.   Psychiatric: He has a normal mood and  affect.       Results for orders placed or performed during the hospital encounter of 07/17/22   Comprehensive Metabolic Panel    Specimen: Blood   Result Value Ref Range    Glucose 118 (H) 65 - 99 mg/dL    BUN 6 6 - 20 mg/dL    Creatinine 0.55 (L) 0.76 - 1.27 mg/dL    Sodium 135 (L) 136 - 145 mmol/L    Potassium 3.4 (L) 3.5 - 5.2 mmol/L    Chloride 101 98 - 107 mmol/L    CO2 25.0 22.0 - 29.0 mmol/L    Calcium 9.2 8.6 - 10.5 mg/dL    Total Protein 8.5 6.0 - 8.5 g/dL    Albumin 3.80 3.50 - 5.20 g/dL    ALT (SGPT) 35 1 - 41 U/L    AST (SGOT) 36 1 - 40 U/L    Alkaline Phosphatase 100 39 - 117 U/L    Total Bilirubin 0.7 0.0 - 1.2 mg/dL    Globulin 4.7 gm/dL    A/G Ratio 0.8 g/dL    BUN/Creatinine Ratio 10.9 7.0 - 25.0    Anion Gap 9.0 5.0 - 15.0 mmol/L    eGFR 126.1 >60.0 mL/min/1.73   Hemoglobin A1c    Specimen: Blood   Result Value Ref Range    Hemoglobin A1C 6.40 (H) 4.80 - 5.60 %   CK    Specimen: Blood   Result Value Ref Range    Creatine Kinase 609 (H) 20 - 200 U/L   CBC Auto Differential    Specimen: Blood   Result Value Ref Range    WBC 9.40 3.40 - 10.80 10*3/mm3    RBC 5.80 4.14 - 5.80 10*6/mm3    Hemoglobin 16.2 13.0 - 17.7 g/dL    Hematocrit 50.7 37.5 - 51.0 %    MCV 87.4 79.0 - 97.0 fL    MCH 27.9 26.6 - 33.0 pg    MCHC 32.0 31.5 - 35.7 g/dL    RDW 15.4 12.3 - 15.4 %    RDW-SD 48.2 37.0 - 54.0 fl    MPV 10.0 6.0 - 12.0 fL    Platelets 237 140 - 450 10*3/mm3    Neutrophil % 69.1 42.7 - 76.0 %    Lymphocyte % 20.5 19.6 - 45.3 %    Monocyte % 6.9 5.0 - 12.0 %    Eosinophil % 2.7 0.3 - 6.2 %    Basophil % 0.4 0.0 - 1.5 %    Immature Grans % 0.4 0.0 - 0.5 %    Neutrophils, Absolute 6.49 1.70 - 7.00 10*3/mm3    Lymphocytes, Absolute 1.93 0.70 - 3.10 10*3/mm3    Monocytes, Absolute 0.65 0.10 - 0.90 10*3/mm3    Eosinophils, Absolute 0.25 0.00 - 0.40 10*3/mm3    Basophils, Absolute 0.04 0.00 - 0.20 10*3/mm3    Immature Grans, Absolute 0.04 0.00 - 0.05 10*3/mm3    nRBC 0.0 0.0 - 0.2 /100 WBC       Diagnoses and all  orders for this visit:    1. Acute URI (Primary)  -     pseudoephedrine (Sudafed) 30 MG tablet; Take 1 tablet by mouth Every 4 (Four) Hours As Needed for Congestion for up to 10 days.  Dispense: 40 tablet; Refill: 0  -     ipratropium (ATROVENT) 0.06 % nasal spray; 2 sprays into the nostril(s) as directed by provider 4 (Four) Times a Day.  Dispense: 15 mL; Refill: 0          FOLLOW-UP  As discussed during visit with PCP/[facility] if no improvement or Urgent Care/Emergency Department if worsening of symptoms    Patient verbalizes understanding of medication dosage, comfort measures, instructions for treatment and follow-up.    Bella Eller, APRN  12/23/2022  21:11 EST    The use of a video visit has been reviewed with the patient and verbal informed consent has been obtained. Myself and Marlon Mccarthy participated in this visit. The patient is located in [address] [place] [address].    I am located in [place], [place]. Mychart and Zoom were utilized. I spent 20 minutes in the patient's chart for this visit.

## 2023-01-25 ENCOUNTER — TELEMEDICINE (OUTPATIENT)
Dept: FAMILY MEDICINE CLINIC | Facility: TELEHEALTH | Age: 43
End: 2023-01-25
Payer: COMMERCIAL

## 2023-01-25 DIAGNOSIS — J40 BRONCHITIS: Primary | ICD-10-CM

## 2023-01-25 PROCEDURE — 99213 OFFICE O/P EST LOW 20 MIN: CPT | Performed by: NURSE PRACTITIONER

## 2023-01-25 RX ORDER — PREDNISONE 10 MG/1
TABLET ORAL
Qty: 21 TABLET | Refills: 0 | Status: SHIPPED | OUTPATIENT
Start: 2023-01-25

## 2023-01-25 RX ORDER — DEXTROMETHORPHAN HYDROBROMIDE AND PROMETHAZINE HYDROCHLORIDE 15; 6.25 MG/5ML; MG/5ML
5 SYRUP ORAL 4 TIMES DAILY PRN
Qty: 200 ML | Refills: 0 | Status: SHIPPED | OUTPATIENT
Start: 2023-01-25

## 2023-01-25 NOTE — PROGRESS NOTES
You have chosen to receive care through a telehealth visit.  Do you consent to use a video/audio connection for your medical care today? Yes     CHIEF COMPLAINT  No chief complaint on file.        HPI  Marlon Mccarthy is a 43 y.o. male  presents with complaint of intermittent for past few months chest congestion, wheezing, cough with occasional white sputum production, mild shortness of breath with coughing fit.  Denies fever.     Review of Systems   See HPI    Past Medical History:   Diagnosis Date   • Diverticulitis of colon    • Hypertension    • Non morbid obesity due to excess calories 5/16/2017   • SVT (supraventricular tachycardia) ([facility])        Family History   Problem Relation Age of Onset   • Diabetes Mother    • Hypertension Mother    • Hypertension Father    • No Known Problems Sister    • No Known Problems Sister    • No Known Problems Maternal Grandmother    • No Known Problems Maternal Grandfather    • No Known Problems Paternal Grandmother    • No Known Problems Paternal Grandfather        Social History     Socioeconomic History   • Marital status:    Tobacco Use   • Smoking status: Never   • Smokeless tobacco: Never   Substance and Sexual Activity   • Alcohol use: Yes     Alcohol/week: 7.0 standard drinks     Types: 7 Cans of beer per week     Comment: social   • Drug use: No   • Sexual activity: Not Currently     Partners: Female     Birth control/protection: Condom       Marlon Mccarthy  reports that he has never smoked. He has never used smokeless tobacco..              There were no vitals taken for this visit.    PHYSICAL EXAM  Physical Exam   Constitutional: He is oriented to person, place, and time. He appears well-developed and well-nourished. He does not have a sickly appearance. He does not appear ill.   HENT:   Head: Normocephalic and atraumatic.   Pulmonary/Chest: Effort normal.  No respiratory distress.  Neurological: He is alert and oriented to person, place, and time.          Diagnoses and all orders for this visit:    1. Bronchitis (Primary)  -     predniSONE (DELTASONE) 10 MG (21) dose pack; Use as directed on package  Dispense: 21 tablet; Refill: 0  -     promethazine-dextromethorphan (PROMETHAZINE-DM) 6.25-15 MG/5ML syrup; Take 5 mL by mouth 4 (Four) Times a Day As Needed for Cough.  Dispense: 200 mL; Refill: 0    --take medications as prescribed  --increase fluids, rest as needed, tylenol or ibuprofen for pain  --f/u in 5-7 days if no improvement        FOLLOW-UP  As discussed during visit with PCP/[facility] if no improvement or Urgent Care/Emergency Department if worsening of symptoms    Patient verbalizes understanding of medication dosage, comfort measures, instructions for treatment and follow-up.    Neva Calle, APRN  01/25/2023  08:53 EST    The use of a video visit has been reviewed with the patient and verbal informed consent has been obtained. Myself and Marlon Mccarthy participated in this visit. The patient is located in [address] [place] [address].    I am located in [place], [place]. [employer] and [employer] were utilized. I spent 8 minutes in the patient's chart for this visit.

## 2023-02-21 ENCOUNTER — TELEMEDICINE (OUTPATIENT)
Dept: FAMILY MEDICINE CLINIC | Facility: TELEHEALTH | Age: 43
End: 2023-02-21
Payer: COMMERCIAL

## 2023-02-21 DIAGNOSIS — J40 BRONCHITIS: Primary | ICD-10-CM

## 2023-02-21 PROCEDURE — 99213 OFFICE O/P EST LOW 20 MIN: CPT | Performed by: NURSE PRACTITIONER

## 2023-02-21 RX ORDER — METHYLPREDNISOLONE 4 MG/1
TABLET ORAL
Qty: 21 TABLET | Refills: 0 | Status: SHIPPED | OUTPATIENT
Start: 2023-02-21

## 2023-02-21 RX ORDER — AZITHROMYCIN 250 MG/1
TABLET, FILM COATED ORAL
Qty: 6 TABLET | Refills: 0 | Status: SHIPPED | OUTPATIENT
Start: 2023-02-21

## 2023-02-21 RX ORDER — ALBUTEROL SULFATE 90 UG/1
2 AEROSOL, METERED RESPIRATORY (INHALATION) EVERY 4 HOURS PRN
Qty: 18 G | Refills: 0 | Status: SHIPPED | OUTPATIENT
Start: 2023-02-21

## 2023-02-21 NOTE — PROGRESS NOTES
You have chosen to receive care through a telehealth visit.  Do you consent to use a video/audio connection for your medical care today? Yes     CHIEF COMPLAINT  Chief Complaint   Patient presents with   • Cough         HPI  Marlon Mccarthy is a 43 y.o. male  presents with complaint of cough.  He state that it started a couple of days ago.  He also states that he gets bronchitis frequently.  He is also concerned about having asthma.    Review of Systems   Respiratory: Positive for cough.    All other systems reviewed and are negative.      Past Medical History:   Diagnosis Date   • Diverticulitis of colon    • Hypertension    • Non morbid obesity due to excess calories 5/16/2017   • SVT (supraventricular tachycardia) ([facility])        Family History   Problem Relation Age of Onset   • Diabetes Mother    • Hypertension Mother    • Hypertension Father    • No Known Problems Sister    • No Known Problems Sister    • No Known Problems Maternal Grandmother    • No Known Problems Maternal Grandfather    • No Known Problems Paternal Grandmother    • No Known Problems Paternal Grandfather        Social History     Socioeconomic History   • Marital status:    Tobacco Use   • Smoking status: Never   • Smokeless tobacco: Never   Substance and Sexual Activity   • Alcohol use: Yes     Alcohol/week: 7.0 standard drinks     Types: 7 Cans of beer per week     Comment: social   • Drug use: No   • Sexual activity: Not Currently     Partners: Female     Birth control/protection: Condom       Marlon Mccarthy  reports that he has never smoked. He has never used smokeless tobacco..         There were no vitals taken for this visit.    PHYSICAL EXAM  Physical Exam   Constitutional: He appears well-developed and well-nourished.   HENT:   Head: Normocephalic.   Eyes: Pupils are equal, round, and reactive to light.   Pulmonary/Chest: Effort normal.   Musculoskeletal: Normal range of motion.   Neurological: He is alert.   Psychiatric: He  has a normal mood and affect.       Results for orders placed or performed during the hospital encounter of 07/17/22   Comprehensive Metabolic Panel    Specimen: Blood   Result Value Ref Range    Glucose 118 (H) 65 - 99 mg/dL    BUN 6 6 - 20 mg/dL    Creatinine 0.55 (L) 0.76 - 1.27 mg/dL    Sodium 135 (L) 136 - 145 mmol/L    Potassium 3.4 (L) 3.5 - 5.2 mmol/L    Chloride 101 98 - 107 mmol/L    CO2 25.0 22.0 - 29.0 mmol/L    Calcium 9.2 8.6 - 10.5 mg/dL    Total Protein 8.5 6.0 - 8.5 g/dL    Albumin 3.80 3.50 - 5.20 g/dL    ALT (SGPT) 35 1 - 41 U/L    AST (SGOT) 36 1 - 40 U/L    Alkaline Phosphatase 100 39 - 117 U/L    Total Bilirubin 0.7 0.0 - 1.2 mg/dL    Globulin 4.7 gm/dL    A/G Ratio 0.8 g/dL    BUN/Creatinine Ratio 10.9 7.0 - 25.0    Anion Gap 9.0 5.0 - 15.0 mmol/L    eGFR 126.1 >60.0 mL/min/1.73   Hemoglobin A1c    Specimen: Blood   Result Value Ref Range    Hemoglobin A1C 6.40 (H) 4.80 - 5.60 %   CK    Specimen: Blood   Result Value Ref Range    Creatine Kinase 609 (H) 20 - 200 U/L   CBC Auto Differential    Specimen: Blood   Result Value Ref Range    WBC 9.40 3.40 - 10.80 10*3/mm3    RBC 5.80 4.14 - 5.80 10*6/mm3    Hemoglobin 16.2 13.0 - 17.7 g/dL    Hematocrit 50.7 37.5 - 51.0 %    MCV 87.4 79.0 - 97.0 fL    MCH 27.9 26.6 - 33.0 pg    MCHC 32.0 31.5 - 35.7 g/dL    RDW 15.4 12.3 - 15.4 %    RDW-SD 48.2 37.0 - 54.0 fl    MPV 10.0 6.0 - 12.0 fL    Platelets 237 140 - 450 10*3/mm3    Neutrophil % 69.1 42.7 - 76.0 %    Lymphocyte % 20.5 19.6 - 45.3 %    Monocyte % 6.9 5.0 - 12.0 %    Eosinophil % 2.7 0.3 - 6.2 %    Basophil % 0.4 0.0 - 1.5 %    Immature Grans % 0.4 0.0 - 0.5 %    Neutrophils, Absolute 6.49 1.70 - 7.00 10*3/mm3    Lymphocytes, Absolute 1.93 0.70 - 3.10 10*3/mm3    Monocytes, Absolute 0.65 0.10 - 0.90 10*3/mm3    Eosinophils, Absolute 0.25 0.00 - 0.40 10*3/mm3    Basophils, Absolute 0.04 0.00 - 0.20 10*3/mm3    Immature Grans, Absolute 0.04 0.00 - 0.05 10*3/mm3    nRBC 0.0 0.0 - 0.2 /100 WBC        Diagnoses and all orders for this visit:    1. Bronchitis (Primary)  -     methylPREDNISolone (MEDROL) 4 MG dose pack; Take as directed on package instructions.  Dispense: 21 tablet; Refill: 0  -     azithromycin (Zithromax Z-Zafar) 250 MG tablet; Take 2 tablets by mouth on day 1, then 1 tablet daily on days 2-5  Dispense: 6 tablet; Refill: 0  -     albuterol sulfate  (90 Base) MCG/ACT inhaler; Inhale 2 puffs Every 4 (Four) Hours As Needed for Wheezing.  Dispense: 18 g; Refill: 0          FOLLOW-UP  As discussed during visit with PCP/[facility] if no improvement or Urgent Care/Emergency Department if worsening of symptoms    Instructed pt to ask his doctor about a PFT or even a referral to a pulmonologist for asthma work up    Instructed pt to take Emergen C or Airborne gummies to boost immune system.    Patient verbalizes understanding of medication dosage, comfort measures, instructions for treatment and follow-up.    Bella Eller, APRN  02/21/2023  03:37 EST    The use of a video visit has been reviewed with the patient and verbal informed consent has been obtained. Myself and Marlon Mccarthy participated in this visit. The patient is located in [address] [place] [address].    I am located in [place], [place]. Mychart and [employer] were utilized. I spent 20 minutes in the patient's chart for this visit.

## 2023-04-24 DIAGNOSIS — I10 BENIGN ESSENTIAL HTN: ICD-10-CM

## 2023-04-24 RX ORDER — CARVEDILOL 12.5 MG/1
12.5 TABLET ORAL 2 TIMES DAILY WITH MEALS
Qty: 60 TABLET | Refills: 2 | Status: SHIPPED | OUTPATIENT
Start: 2023-04-24

## 2023-05-03 ENCOUNTER — OFFICE VISIT (OUTPATIENT)
Dept: INTERNAL MEDICINE | Facility: CLINIC | Age: 43
End: 2023-05-03
Payer: COMMERCIAL

## 2023-05-03 VITALS
HEIGHT: 77 IN | DIASTOLIC BLOOD PRESSURE: 84 MMHG | SYSTOLIC BLOOD PRESSURE: 164 MMHG | HEART RATE: 96 BPM | BODY MASS INDEX: 37.19 KG/M2 | OXYGEN SATURATION: 96 % | WEIGHT: 315 LBS

## 2023-05-03 DIAGNOSIS — G47.00 INSOMNIA, UNSPECIFIED TYPE: ICD-10-CM

## 2023-05-03 DIAGNOSIS — I10 BENIGN ESSENTIAL HTN: Primary | ICD-10-CM

## 2023-05-03 DIAGNOSIS — M54.50 CHRONIC BILATERAL LOW BACK PAIN, UNSPECIFIED WHETHER SCIATICA PRESENT: ICD-10-CM

## 2023-05-03 DIAGNOSIS — R73.03 PREDIABETES: ICD-10-CM

## 2023-05-03 DIAGNOSIS — E66.01 MORBID OBESITY DUE TO EXCESS CALORIES: ICD-10-CM

## 2023-05-03 DIAGNOSIS — G89.29 CHRONIC BILATERAL LOW BACK PAIN, UNSPECIFIED WHETHER SCIATICA PRESENT: ICD-10-CM

## 2023-05-03 RX ORDER — CARVEDILOL 25 MG/1
25 TABLET ORAL 2 TIMES DAILY WITH MEALS
Qty: 60 TABLET | Refills: 5 | Status: SHIPPED | OUTPATIENT
Start: 2023-05-03

## 2023-05-03 RX ORDER — HYDROXYZINE 50 MG/1
50 TABLET, FILM COATED ORAL NIGHTLY PRN
Qty: 30 TABLET | Refills: 0 | Status: SHIPPED | OUTPATIENT
Start: 2023-05-03

## 2023-05-03 RX ORDER — CYCLOBENZAPRINE HCL 10 MG
10 TABLET ORAL 3 TIMES DAILY PRN
Qty: 30 TABLET | Refills: 3 | Status: SHIPPED | OUTPATIENT
Start: 2023-05-03

## 2023-05-03 NOTE — PROGRESS NOTES
Chief Complaint   Patient presents with   • Anxiety   • Insomnia   • Weight Gain   • Hypertension         History:  Marlon Mccarthy is a 43 y.o. male who presents today for evaluation of the above problems.          1) Taking Coreg 12.5 mg BID and Hyzaar daily. BP home 155-160/70-80, pulse in the  range.    2) Back pain 1 month. Taking IB regularly. Pulled it getting something out of truck. Doesn't think he needs xrays. Hurts low back both sides.    3) Continuing to have difficulty with diet and overeating. Does not want to see bariatrics at this time, but would like some help.    4) Stopped trazodone because of side effects. Still has difficulty sleeping. Defers sleep study, does not think he has sleep apnea.    Anxiety  Symptoms include insomnia.       Insomnia    Hypertension  Associated symptoms include anxiety.       ROS:  Review of Systems   Psychiatric/Behavioral: The patient has insomnia.    as above      Allergies   Allergen Reactions   • Lisinopril Cough     Past Medical History:   Diagnosis Date   • Anxiety    • Diverticulitis of colon    • Diverticulosis    • Hypertension    • Non morbid obesity due to excess calories 05/16/2017   • SVT (supraventricular tachycardia)      Past Surgical History:   Procedure Laterality Date   • COLONOSCOPY     • HERNIA REPAIR     • INGUINAL HERNIA REPAIR       Family History   Problem Relation Age of Onset   • Diabetes Mother    • Hypertension Mother    • Stroke Mother    • Hypertension Father    • No Known Problems Sister    • No Known Problems Sister    • No Known Problems Maternal Grandmother    • No Known Problems Maternal Grandfather    • No Known Problems Paternal Grandmother    • No Known Problems Paternal Grandfather      Marlon  reports that he has never smoked. He has never used smokeless tobacco. He reports current alcohol use of about 7.0 standard drinks per week. He reports that he does not use drugs.    I have reviewed and updated the above  "documentation (if necessary) including but not limited to chief complaint, ROS, PFSH, allergies and medications        Current Outpatient Medications:   •  albuterol sulfate  (90 Base) MCG/ACT inhaler, Inhale 2 puffs Every 4 (Four) Hours As Needed for Wheezing., Disp: 18 g, Rfl: 0  •  carvedilol (COREG) 25 MG tablet, Take 1 tablet by mouth 2 (Two) Times a Day With Meals., Disp: 60 tablet, Rfl: 5  •  ipratropium (ATROVENT) 0.06 % nasal spray, 2 sprays into the nostril(s) as directed by provider 4 (Four) Times a Day., Disp: 15 mL, Rfl: 0  •  losartan-hydrochlorothiazide (HYZAAR) 50-12.5 MG per tablet, Take 1 tablet by mouth Daily., Disp: 30 tablet, Rfl: 5  •  cyclobenzaprine (FLEXERIL) 10 MG tablet, Take 1 tablet by mouth 3 (Three) Times a Day As Needed (back pain)., Disp: 30 tablet, Rfl: 3  •  hydrOXYzine (ATARAX) 50 MG tablet, Take 1 tablet by mouth At Night As Needed (sleep)., Disp: 30 tablet, Rfl: 0  •  Semaglutide-Weight Management 0.25 MG/0.5ML solution auto-injector, Inject 0.25 mg under the skin into the appropriate area as directed 1 (One) Time Per Week for 28 days, THEN 0.5 mg 1 (One) Time Per Week for 28 days., Disp: 1.5 mL, Rfl: 2    OBJECTIVE:  Visit Vitals  /84 (BP Location: Right arm, Patient Position: Sitting, Cuff Size: Adult)   Pulse 96   Ht 195.6 cm (77\")   Wt (!) 230 kg (508 lb)   SpO2 96%   BMI 60.24 kg/m²      Physical Exam  Vitals and nursing note reviewed.   Constitutional:       General: He is not in acute distress.     Appearance: Normal appearance. He is not ill-appearing, toxic-appearing or diaphoretic.   HENT:      Head: Normocephalic and atraumatic.   Cardiovascular:      Rate and Rhythm: Normal rate.   Pulmonary:      Effort: Pulmonary effort is normal. No respiratory distress.   Abdominal:      Palpations: Abdomen is soft.   Musculoskeletal:      Right lower leg: No edema (no pitting).      Left lower leg: No edema (no pitting).   Skin:     General: Skin is warm and dry. "   Neurological:      Mental Status: He is alert and oriented to person, place, and time.   Psychiatric:         Mood and Affect: Mood normal.         Behavior: Behavior normal.         Thought Content: Thought content normal.         Judgment: Judgment normal.         [facility]    Assessment/Plan    Diagnoses and all orders for this visit:    1. Benign essential HTN (Primary)  -     Comprehensive metabolic panel; Future  -     carvedilol (COREG) 25 MG tablet; Take 1 tablet by mouth 2 (Two) Times a Day With Meals.  Dispense: 60 tablet; Refill: 5    2. Morbid obesity due to excess calories  -     TSH; Future  -     Semaglutide-Weight Management 0.25 MG/0.5ML solution auto-injector; Inject 0.25 mg under the skin into the appropriate area as directed 1 (One) Time Per Week for 28 days, THEN 0.5 mg 1 (One) Time Per Week for 28 days.  Dispense: 1.5 mL; Refill: 2    3. Prediabetes  -     Hemoglobin A1c; Future    4. Chronic bilateral low back pain, unspecified whether sciatica present  -     cyclobenzaprine (FLEXERIL) 10 MG tablet; Take 1 tablet by mouth 3 (Three) Times a Day As Needed (back pain).  Dispense: 30 tablet; Refill: 3    5. Insomnia, unspecified type  -     hydrOXYzine (ATARAX) 50 MG tablet; Take 1 tablet by mouth At Night As Needed (sleep).  Dispense: 30 tablet; Refill: 0    Blood pressure not within goal. Increase Coreg to 25 mg BID.    Other conditions addressed as above.    Class 3 Severe Obesity (BMI >=40). Obesity-related health conditions include the following: hypertension. Obesity is worsening. BMI is is above average; BMI management plan is completed. We discussed low calorie, low carb based diet program, portion control, increasing exercise and pharmacologic options including semaglutide.      Education materials and an After Visit Summary were printed and given to the patient at discharge.  Return in about 4 weeks (around 5/31/2023) for Annual physical. and Blood pressure evaluation.         Maile  KARLA Araiza   09:14 CDT  5/3/2023   Answers for HPI/ROS submitted by the patient on 4/25/2023  What is the primary reason for your visit?: Other  Please describe your symptoms.: Wheezing  Have you had these symptoms before?: Yes  How long have you been having these symptoms?: Greater than 2 weeks  Please describe any probable cause for these symptoms. : Sleeping

## 2023-05-07 ENCOUNTER — TELEMEDICINE (OUTPATIENT)
Dept: FAMILY MEDICINE CLINIC | Facility: TELEHEALTH | Age: 43
End: 2023-05-07
Payer: COMMERCIAL

## 2023-05-07 DIAGNOSIS — B34.9 VIRAL ILLNESS: Primary | ICD-10-CM

## 2023-05-07 RX ORDER — DEXTROMETHORPHAN HYDROBROMIDE AND PROMETHAZINE HYDROCHLORIDE 15; 6.25 MG/5ML; MG/5ML
5 SYRUP ORAL 4 TIMES DAILY PRN
Qty: 120 ML | Refills: 0 | Status: SHIPPED | OUTPATIENT
Start: 2023-05-07

## 2023-05-07 RX ORDER — ALBUTEROL SULFATE 90 UG/1
2 AEROSOL, METERED RESPIRATORY (INHALATION) EVERY 4 HOURS PRN
Qty: 6.7 G | Refills: 0 | Status: SHIPPED | OUTPATIENT
Start: 2023-05-07

## 2023-05-07 RX ORDER — BENZONATATE 200 MG/1
200 CAPSULE ORAL 3 TIMES DAILY PRN
Qty: 20 CAPSULE | Refills: 0 | Status: SHIPPED | OUTPATIENT
Start: 2023-05-07

## 2023-05-07 NOTE — LETTER
May 7, 2023     Patient: Marlon Mccarthy   YOB: 1980   Date of Visit: 5/7/2023       To Whom It May Concern:    It is my medical opinion that Marlon Mccarthy may return to work on Wednesday 5/10/2023.     Sincerely,    KARLA Padron      URGENT CARE VIDEO VISIT PROVIDER    CC: No Recipients

## 2023-05-07 NOTE — PROGRESS NOTES
CHIEF COMPLAINT  Chief Complaint   Patient presents with    Cough         HPI  Marlon Mccarthy is a 43 y.o. male  presents with complaint of cough, sore throat and sinus drainage. Symptoms started yesterday.   He has a negative COVID-19 test.     Review of Systems   Constitutional:  Positive for chills and fatigue. Negative for diaphoresis and fever.   HENT:  Positive for congestion, postnasal drip, rhinorrhea, sinus pressure, sinus pain, sneezing and sore throat.    Respiratory:  Positive for cough, chest tightness and wheezing. Negative for shortness of breath.    Cardiovascular:  Negative for chest pain.   Gastrointestinal:  Negative for diarrhea, nausea and vomiting.   Musculoskeletal:  Negative for myalgias.   Neurological:  Positive for headaches.     Past Medical History:   Diagnosis Date    Anxiety     Diverticulitis of colon     Diverticulosis     Hypertension     Non morbid obesity due to excess calories 05/16/2017    SVT (supraventricular tachycardia)        Family History   Problem Relation Age of Onset    Diabetes Mother     Hypertension Mother     Stroke Mother     Hypertension Father     No Known Problems Sister     No Known Problems Sister     No Known Problems Maternal Grandmother     No Known Problems Maternal Grandfather     No Known Problems Paternal Grandmother     No Known Problems Paternal Grandfather        Social History     Socioeconomic History    Marital status:    Tobacco Use    Smoking status: Never     Passive exposure: Never    Smokeless tobacco: Never   Vaping Use    Vaping Use: Never used   Substance and Sexual Activity    Alcohol use: Yes     Alcohol/week: 7.0 standard drinks     Types: 7 Cans of beer per week     Comment: social    Drug use: No    Sexual activity: Not Currently     Partners: Female     Birth control/protection: Condom       Marlon Mccarthy  reports that he has never smoked. He has never been exposed to tobacco smoke. He has never used smokeless  tobacco.      There were no vitals taken for this visit.    PHYSICAL EXAM  Physical Exam   Constitutional: He is oriented to person, place, and time. He appears well-developed and well-nourished. He has a sickly appearance. He does not appear ill. No distress. He appears obese.  HENT:   Head: Normocephalic and atraumatic.   Nose: Rhinorrhea and congestion present.   Eyes: EOM are normal.   Neck: Neck normal appearance.  Pulmonary/Chest: Effort normal.  No respiratory distress.  Wheezy cough noted    Neurological: He is alert and oriented to person, place, and time.   Skin: Skin is dry.   Psychiatric: He has a normal mood and affect.       Diagnoses and all orders for this visit:    1. Viral illness (Primary)    Other orders  -     benzonatate (TESSALON) 200 MG capsule; Take 1 capsule by mouth 3 (Three) Times a Day As Needed for Cough.  Dispense: 20 capsule; Refill: 0  -     promethazine-dextromethorphan (PROMETHAZINE-DM) 6.25-15 MG/5ML syrup; Take 5 mL by mouth 4 (Four) Times a Day As Needed for Cough.  Dispense: 120 mL; Refill: 0  -     albuterol sulfate  (90 Base) MCG/ACT inhaler; Inhale 2 puffs Every 4 (Four) Hours As Needed for Wheezing.  Dispense: 6.7 g; Refill: 0    2 negative home COVID-19 tests     The use of a video visit has been reviewed with the patient and verbal informed consent has been obtained. Myself and Marlon Mccarthy participated in this visit. The patient is located in [address] [place] [address]. I am located in [place], [place]. [employer] were utilized.       Note Disclaimer: At [facility], we believe that sharing information builds trust and better   relationships. You are receiving this note because you recently visited [facility]. It is possible you   will see health information before a provider has talked with you about it. This kind of information can   be easy to misunderstand. To help you fully understand what it means for your health, we urge you to    discuss this note with your provider.    Ofelia Omalley, KARLA  05/07/2023  05:07 EDT

## 2023-05-07 NOTE — PATIENT INSTRUCTIONS
Drink plenty of water  Over the counter pain relievers okay   If symptoms do not improve in 3-5 days follow up with your primary care provider or urgent care  If symptoms worsen follow up with urgent care or the emergency room          Symptom Relief for Viral Illnesses       1. DIAGNOSIS  2. GENERAL INSTRUCTIONS   Cold or cough  Middle ear fluid ([employer]a [employer] with Effusion, OME)  Flu  Viral sore throat  Bronchitis    You have been diagnosed with an illness caused by a virus.  Antibiotics do not work on viruses.  When antibiotics aren't needed, they won't help you, and the side effects could still hurt you.  The treatments prescribed below will help you feel better while your body fights off the virus.   Drink extra water and fluids  Use a cool mist vaporizer or saline nasal spray to relieve congestion  For sore throats in older children and adults, use ice chips, sore throat spray, or lozenges  Use honey to relieve cough.  Do not give honey to an infant younger than 1 year.      3. SPECIFIC MEDICINES  4. FOLLOW UP   Use over-the-counter medications specific to your symptoms. Use medicines according to the package instructions or as directed by your healthcare professional.  Stop the medication when the symptoms get better.   If not improved in 5 days, if new symptoms occur, or if you have other concerns, please call or return to the office for a recheck.         To learn more about antibiotic prescribing and use, visit www.cdc.gov/antibiotic-use

## 2023-07-24 ENCOUNTER — TELEPHONE (OUTPATIENT)
Dept: UROLOGY | Facility: CLINIC | Age: 43
End: 2023-07-24

## 2023-07-24 NOTE — TELEPHONE ENCOUNTER
Hub staff attempted to follow warm transfer process and was unsuccessful     Caller: IVA MCMAHON    Relationship to patient: SELF    Best call back number: [phone]     Patient is needing: PLS CALL PT TO DISCUSS MEDS PT IS TAKING FOR HIS DX.

## 2023-07-25 NOTE — TELEPHONE ENCOUNTER
Patient called back- stating he is needing a statement with his diagnosis stating that he can barely walk and won't be able to work until dx has been treated.  Informed pt I would need to talk to estelle to see what he recommends

## 2023-07-31 ENCOUNTER — OFFICE VISIT (OUTPATIENT)
Dept: UROLOGY | Facility: CLINIC | Age: 43
End: 2023-07-31
Payer: COMMERCIAL

## 2023-07-31 VITALS — HEIGHT: 77 IN | TEMPERATURE: 97 F | BODY MASS INDEX: 37.19 KG/M2 | WEIGHT: 315 LBS

## 2023-07-31 DIAGNOSIS — N50.89 SCROTAL EDEMA: Primary | ICD-10-CM

## 2023-07-31 PROCEDURE — 99214 OFFICE O/P EST MOD 30 MIN: CPT | Performed by: PHYSICIAN ASSISTANT

## 2023-07-31 RX ORDER — MELOXICAM 7.5 MG/1
15 TABLET ORAL DAILY
Qty: 60 TABLET | Refills: 0 | Status: SHIPPED | OUTPATIENT
Start: 2023-07-31 | End: 2023-08-30

## 2023-07-31 RX ORDER — FUROSEMIDE 20 MG/1
20 TABLET ORAL DAILY
Qty: 14 TABLET | Refills: 0 | Status: SHIPPED | OUTPATIENT
Start: 2023-07-31 | End: 2023-08-14

## 2023-08-01 ENCOUNTER — TELEPHONE (OUTPATIENT)
Dept: UROLOGY | Facility: CLINIC | Age: 43
End: 2023-08-01
Payer: COMMERCIAL

## 2023-08-01 LAB
ALBUMIN SERPL-MCNC: 3.7 G/DL (ref 3.5–5.2)
ALBUMIN/GLOB SERPL: 1 G/DL
ALP SERPL-CCNC: 94 U/L (ref 39–117)
ALT SERPL-CCNC: 28 U/L (ref 1–41)
AST SERPL-CCNC: 22 U/L (ref 1–40)
BILIRUB SERPL-MCNC: 0.6 MG/DL (ref 0–1.2)
BUN SERPL-MCNC: 14 MG/DL (ref 6–20)
BUN/CREAT SERPL: 17.3 (ref 7–25)
CALCIUM SERPL-MCNC: 9.6 MG/DL (ref 8.6–10.5)
CHLORIDE SERPL-SCNC: 100 MMOL/L (ref 98–107)
CO2 SERPL-SCNC: 24.9 MMOL/L (ref 22–29)
CREAT SERPL-MCNC: 0.81 MG/DL (ref 0.76–1.27)
EGFRCR SERPLBLD CKD-EPI 2021: 111.5 ML/MIN/1.73
ERYTHROCYTE [DISTWIDTH] IN BLOOD BY AUTOMATED COUNT: 14.9 % (ref 12.3–15.4)
GLOBULIN SER CALC-MCNC: 3.8 GM/DL
GLUCOSE SERPL-MCNC: 110 MG/DL (ref 65–99)
HCT VFR BLD AUTO: 48.5 % (ref 37.5–51)
HGB BLD-MCNC: 15.7 G/DL (ref 13–17.7)
MCH RBC QN AUTO: 27.6 PG (ref 26.6–33)
MCHC RBC AUTO-ENTMCNC: 32.4 G/DL (ref 31.5–35.7)
MCV RBC AUTO: 85.2 FL (ref 79–97)
PLATELET # BLD AUTO: 259 10*3/MM3 (ref 140–450)
POTASSIUM SERPL-SCNC: 4.3 MMOL/L (ref 3.5–5.2)
PROT SERPL-MCNC: 7.5 G/DL (ref 6–8.5)
RBC # BLD AUTO: 5.69 10*6/MM3 (ref 4.14–5.8)
SODIUM SERPL-SCNC: 138 MMOL/L (ref 136–145)
WBC # BLD AUTO: 10.25 10*3/MM3 (ref 3.4–10.8)

## 2023-08-12 ENCOUNTER — TELEMEDICINE (OUTPATIENT)
Dept: FAMILY MEDICINE CLINIC | Facility: TELEHEALTH | Age: 43
End: 2023-08-12
Payer: COMMERCIAL

## 2023-08-12 DIAGNOSIS — J22 LOWER RESPIRATORY INFECTION (E.G., BRONCHITIS, PNEUMONIA, PNEUMONITIS, PULMONITIS): Primary | ICD-10-CM

## 2023-08-12 RX ORDER — AZITHROMYCIN 250 MG/1
TABLET, FILM COATED ORAL
Qty: 6 TABLET | Refills: 0 | Status: SHIPPED | OUTPATIENT
Start: 2023-08-12

## 2023-08-12 RX ORDER — METHYLPREDNISOLONE 4 MG/1
TABLET ORAL
Qty: 21 TABLET | Refills: 0 | Status: SHIPPED | OUTPATIENT
Start: 2023-08-12

## 2023-08-12 RX ORDER — DEXTROMETHORPHAN HYDROBROMIDE AND PROMETHAZINE HYDROCHLORIDE 15; 6.25 MG/5ML; MG/5ML
5 SYRUP ORAL 4 TIMES DAILY PRN
Qty: 200 ML | Refills: 0 | Status: SHIPPED | OUTPATIENT
Start: 2023-08-12

## 2023-08-12 NOTE — PATIENT INSTRUCTIONS
Acute Bronchitis, Adult    Acute bronchitis is sudden inflammation of the main airways (bronchi) that come off the windpipe (trachea) in the lungs. The swelling causes the airways to get smaller and make more mucus than normal. This can make it hard to breathe and can cause coughing or noisy breathing (wheezing).  Acute bronchitis may last several weeks. The cough may last longer. Allergies, asthma, and exposure to smoke may make the condition worse.  What are the causes?  This condition can be caused by germs and by substances that irritate the lungs, including:  Cold and flu viruses. The most common cause of this condition is the virus that causes the common cold.  Bacteria. This is less common.  Breathing in substances that irritate the lungs, including:  Smoke from cigarettes and other forms of tobacco.  Dust and pollen.  Fumes from household cleaning products, gases, or burned fuel.  Indoor or outdoor air pollution.  What increases the risk?  The following factors may make you more likely to develop this condition:  A weak body's defense system, also called the immune system.  A condition that affects your lungs and breathing, such as asthma.  What are the signs or symptoms?  Common symptoms of this condition include:  Coughing. This may bring up clear, yellow, or green mucus from your lungs (sputum).  Wheezing.  Runny or stuffy nose.  Having too much mucus in your lungs (chest congestion).  Shortness of breath.  Aches and pains, including sore throat or chest.  How is this diagnosed?  This condition is usually diagnosed based on:  Your symptoms and medical history.  A physical exam.  You may also have other tests, including tests to rule out other conditions, such as pneumonia. These tests include:  A test of lung function.  Test of a mucus sample to look for the presence of bacteria.  Tests to check the oxygen level in your blood.  Blood tests.  Chest X-ray.  How is this treated?  Most cases of acute  bronchitis clear up over time without treatment. Your health care provider may recommend:  Drinking more fluids to help thin your mucus so it is easier to cough up.  Taking inhaled medicine (inhaler) to improve air flow in and out of your lungs.  Using a vaporizer or a humidifier. These are machines that add water to the air to help you breathe better.  Taking a medicine that thins mucus and clears congestion (expectorant).  Taking a medicine that prevents or stops coughing (cough suppressant).  It is not common to take an antibiotic medicine for this condition.  Follow these instructions at home:    Take over-the-counter and prescription medicines only as told by your health care provider.  Use an inhaler, vaporizer, or humidifier as told by your health care provider.  Take two teaspoons (10 mL) of honey at bedtime to lessen coughing at night.  Drink enough fluid to keep your urine pale yellow.  Do not use any products that contain nicotine or tobacco. These products include cigarettes, chewing tobacco, and vaping devices, such as e-cigarettes. If you need help quitting, ask your health care provider.  Get plenty of rest.  Return to your normal activities as told by your health care provider. Ask your health care provider what activities are safe for you.  Keep all follow-up visits. This is important.  How is this prevented?  To lower your risk of getting this condition again:  Wash your hands often with soap and water for at least 20 seconds. If soap and water are not available, use hand .  Avoid contact with people who have cold symptoms.  Try not to touch your mouth, nose, or eyes with your hands.  Avoid breathing in smoke or chemical fumes. Breathing smoke or chemical fumes will make your condition worse.  Get the flu shot every year.  Contact a health care provider if:  Your symptoms do not improve after 2 weeks.  You have trouble coughing up the mucus.  Your cough keeps you awake at night.  You have  a fever.  Get help right away if you:  Cough up blood.  Feel pain in your chest.  Have severe shortness of breath.  Faint or keep feeling like you are going to faint.  Have a severe headache.  Have a fever or chills that get worse.  These symptoms may represent a serious problem that is an emergency. Do not wait to see if the symptoms will go away. Get medical help right away. Call your local emergency services (911 in the [place] Do not drive yourself to the hospital.  Summary  Acute bronchitis is inflammation of the main airways (bronchi) that come off the windpipe (trachea) in the lungs. The swelling causes the airways to get smaller and make more mucus than normal.  Drinking more fluids can help thin your mucus so it is easier to cough up.  Take over-the-counter and prescription medicines only as told by your health care provider.  Do not use any products that contain nicotine or tobacco. These products include cigarettes, chewing tobacco, and vaping devices, such as e-cigarettes. If you need help quitting, ask your health care provider.  Contact a health care provider if your symptoms do not improve after 2 weeks.  This information is not intended to replace advice given to you by your health care provider. Make sure you discuss any questions you have with your health care provider.  Document Revised: 03/30/2023 Document Reviewed: 04/20/2022  [employer] Patient Education c 2023 [employer]

## 2023-08-12 NOTE — PROGRESS NOTES
You have chosen to receive care through a telehealth visit.  Do you consent to use a video/audio connection for your medical care today? Yes     CHIEF COMPLAINT  No chief complaint on file.        HPI  Marlon Mccarthy is a 43 y.o. male  presents with complaint of 3 day history of cough with clear sputum production which is worse at night, nasal congestion with clear discharge, PND, mild wheezing.  Denies fever, ear pain, sore throat, shortness of breath.  Is not currently taking anything for symptoms.  Does have albuterol inhaler at home.     Review of Systems  See HPI    Past Medical History:   Diagnosis Date    Anxiety     Diverticulitis of colon     Diverticulosis     Hypertension     Non morbid obesity due to excess calories 05/16/2017    SVT (supraventricular tachycardia)        Family History   Problem Relation Age of Onset    Diabetes Mother     Hypertension Mother     Stroke Mother     Hypertension Father     No Known Problems Sister     No Known Problems Sister     No Known Problems Maternal Grandmother     No Known Problems Maternal Grandfather     No Known Problems Paternal Grandmother     No Known Problems Paternal Grandfather        Social History     Socioeconomic History    Marital status:    Tobacco Use    Smoking status: Never     Passive exposure: Never    Smokeless tobacco: Never   Vaping Use    Vaping Use: Never used   Substance and Sexual Activity    Alcohol use: Yes     Alcohol/week: 7.0 standard drinks     Types: 7 Cans of beer per week     Comment: social    Drug use: No    Sexual activity: Not Currently     Partners: Female     Birth control/protection: Condom       Marlon Mccarthy  reports that he has never smoked. He has never been exposed to tobacco smoke. He has never used smokeless tobacco.              There were no vitals taken for this visit.    PHYSICAL EXAM  Physical Exam   Constitutional: He is oriented to person, place, and time. He appears well-developed and  well-nourished. He does not have a sickly appearance. He does not appear ill.   HENT:   Head: Normocephalic and atraumatic.   Pulmonary/Chest: Effort normal.  No respiratory distress (persistent deep cough during visit; no audible wheezing noted).  Neurological: He is alert and oriented to person, place, and time.       Diagnoses and all orders for this visit:    1. Lower respiratory infection (e.g., bronchitis, pneumonia, pneumonitis, pulmonitis) (Primary)  -     azithromycin (Zithromax Z-Zafar) 250 MG tablet; Take 2 tablets by mouth on day 1, then 1 tablet daily on days 2-5  Dispense: 6 tablet; Refill: 0  -     methylPREDNISolone (MEDROL) 4 MG dose pack; Take as directed on package instructions.  Dispense: 21 tablet; Refill: 0  -     promethazine-dextromethorphan (PROMETHAZINE-DM) 6.25-15 MG/5ML syrup; Take 5 mL by mouth 4 (Four) Times a Day As Needed for Cough.  Dispense: 200 mL; Refill: 0    --take medications as prescribed  --increase fluids, rest as needed, tylenol or ibuprofen for pain  --f/u in 3-5 days if no improvement        FOLLOW-UP  As discussed during visit with PCP/[facility] if no improvement or Urgent Care/Emergency Department if worsening of symptoms    Patient verbalizes understanding of medication dosage, comfort measures, instructions for treatment and follow-up.    KARLA Hutson  08/12/2023  11:08 EDT    The use of a video visit has been reviewed with the patient and verbal informed consent has been obtained. Myself and Marlon Mccarthy participated in this visit. The patient is located in [address] [place] [address].    I am located in [place], [place]. [employer] and [employer] were utilized. I spent 8 minutes in the patient's chart for this visit.

## 2023-08-14 DIAGNOSIS — N50.89 SCROTAL EDEMA: ICD-10-CM

## 2023-08-14 RX ORDER — FUROSEMIDE 20 MG/1
20 TABLET ORAL DAILY
Qty: 14 TABLET | Refills: 0 | Status: SHIPPED | OUTPATIENT
Start: 2023-08-14 | End: 2023-08-28

## 2023-08-21 ENCOUNTER — NURSE TRIAGE (OUTPATIENT)
Dept: CALL CENTER | Facility: HOSPITAL | Age: 43
End: 2023-08-21
Payer: COMMERCIAL

## 2023-08-21 NOTE — TELEPHONE ENCOUNTER
"Caller reported cracked back molar, having pain that radiates to ear, head and mouth, has been alternating tylenol and ibuprofen but isn't getting any relief, want to know if went to ER would they be able to help him with the pain  Reason for Disposition   [1] SEVERE pain AND [2] not improved 2 hours after pain medicine/ice    Additional Information   Negative: Knocked out (unconscious) > 1 minute   Negative: Difficult to awaken or acting confused (e.g., disoriented, slurred speech)   Negative: Severe neck pain   Negative: Sounds like a life-threatening emergency to the triager   Negative: Head injury is main concern   Negative: Neck injury is main concern   Negative: Wound looks infected   Negative: Tooth knocked out   Negative: Tooth is almost falling out   Negative: [1] Bleeding AND [2] won't stop after 10 minutes of direct pressure (using correct technique)   Negative: [1] Tooth pushed out if its normal position AND [2] looks severe   Negative: [1] Tooth pushed out of its normal position AND [2] interferes with normal bite or chewing   Negative: Sounds like a serious injury to the triager    Answer Assessment - Initial Assessment Questions  1. MECHANISM: \"How did the injury happen?\"       Reported cracked back molar Trev morning  2. ONSET: \"When did the injury happen?\" (e.g., minutes or hours ago)       Trev morning  3. LOCATION: \"What part of the tooth is injured?\"       Did not report  4. APPEARANCE: \"What does the tooth look like?\"       -  5. BLEEDING: \"Is the mouth still bleeding?\" If Yes, ask: \"Is it difficult to stop?\"       -  6. PAIN: \"Is it painful?\" If Yes, ask: \"How bad is the pain?\"   (Scale 1-10; or mild, moderate, severe)      Yes, reported having pain that radiates to ear, head and mouth, happens every 15 min and lasts about 15-30 min  7. TETANUS: For any breaks in the skin, ask: \"When was the last tetanus booster?\"      -  8. OTHER SYMPTOMS: \"Do you have any other symptoms?\"       Did not " "report any  9. PREGNANCY: \"Is there any chance you are pregnant?\" \"When was your last menstrual period?\"      N/a    Protocols used: Tooth Injury-ADULT-AH    "

## 2023-09-09 DIAGNOSIS — N50.89 SCROTAL EDEMA: ICD-10-CM

## 2023-09-11 RX ORDER — FUROSEMIDE 20 MG/1
20 TABLET ORAL DAILY
Qty: 14 TABLET | Refills: 0 | Status: SHIPPED | OUTPATIENT
Start: 2023-09-11 | End: 2023-09-25

## 2023-09-25 ENCOUNTER — TELEPHONE (OUTPATIENT)
Dept: UROLOGY | Facility: CLINIC | Age: 43
End: 2023-09-25

## 2023-09-25 DIAGNOSIS — R60.1 ANASARCA: Primary | ICD-10-CM

## 2023-09-25 DIAGNOSIS — N50.82 SCROTAL PAIN: Primary | ICD-10-CM

## 2023-09-25 DIAGNOSIS — I10 BENIGN ESSENTIAL HTN: ICD-10-CM

## 2023-09-25 RX ORDER — KETOROLAC TROMETHAMINE 10 MG/1
10 TABLET, FILM COATED ORAL EVERY 6 HOURS PRN
Qty: 20 TABLET | Refills: 0 | Status: SHIPPED | OUTPATIENT
Start: 2023-09-25 | End: 2023-09-30

## 2023-09-25 RX ORDER — LOSARTAN POTASSIUM AND HYDROCHLOROTHIAZIDE 12.5; 5 MG/1; MG/1
1 TABLET ORAL DAILY
Qty: 30 TABLET | Refills: 5 | Status: SHIPPED | OUTPATIENT
Start: 2023-09-25 | End: 2023-10-02

## 2023-09-25 RX ORDER — FUROSEMIDE 20 MG/1
20 TABLET ORAL DAILY
Qty: 30 TABLET | Refills: 1 | Status: SHIPPED | OUTPATIENT
Start: 2023-09-25 | End: 2023-11-24

## 2023-09-25 NOTE — TELEPHONE ENCOUNTER
----- Message from ALCIRA Shepherd sent at 9/25/2023  2:18 PM CDT -----  Regarding: RE: Lasix  I sent in another prescription however patient will need to address further refills and monitoring with his primary care physician.  He will need blood work to monitor renal function on Lasix.  ----- Message -----  From: Kirk Yang MA  Sent: 9/25/2023   2:02 PM CDT  To: ALCIRA Shepherd  Subject: RE: Lasix                                        Yes it did help a lot. Patient said issue had resolved until he ran out of the medication.   ----- Message -----  From: Francesco Garza PA  Sent: 9/25/2023   1:14 PM CDT  To: Kirk Yang MA  Subject: Lasix                                            I was going to see him in follow-up in he did not come in but ask him whether the Lasix has helped with the swelling?  ----- Message -----  From: Kirk Yang MA  Sent: 9/25/2023   1:06 PM CDT  To: ALCIRA Shepherd    Patient is wanting to see if you would send in more Lasix, patient states he has been out and the flare up has gotten worse.

## 2023-09-25 NOTE — TELEPHONE ENCOUNTER
Patient is wanting to see if Francesco Garza would send in more Lasix, patient states he has been out and the flare up has gotten worse. Message sent to Francesco. I let patient know I will contact him once I hear from Francesco.

## 2023-09-26 ENCOUNTER — TELEPHONE (OUTPATIENT)
Dept: UROLOGY | Facility: CLINIC | Age: 43
End: 2023-09-26
Payer: COMMERCIAL

## 2023-09-26 NOTE — TELEPHONE ENCOUNTER
Pt called stating that he is having car trouble and is unable to make appt today. R/s to next week

## 2023-10-02 ENCOUNTER — TELEMEDICINE (OUTPATIENT)
Dept: FAMILY MEDICINE CLINIC | Facility: TELEHEALTH | Age: 43
End: 2023-10-02
Payer: COMMERCIAL

## 2023-10-02 DIAGNOSIS — J22 LOWER RESPIRATORY INFECTION: Primary | ICD-10-CM

## 2023-10-02 RX ORDER — LOSARTAN POTASSIUM 50 MG/1
50 TABLET ORAL DAILY
COMMUNITY

## 2023-10-02 RX ORDER — IBUPROFEN 600 MG/1
1 TABLET ORAL EVERY 6 HOURS
COMMUNITY
Start: 2023-08-24 | End: 2023-10-02

## 2023-10-02 RX ORDER — ALBUTEROL SULFATE 90 UG/1
2 AEROSOL, METERED RESPIRATORY (INHALATION) EVERY 4 HOURS PRN
COMMUNITY

## 2023-10-02 RX ORDER — AZITHROMYCIN 250 MG/1
TABLET, FILM COATED ORAL
Qty: 6 TABLET | Refills: 0 | Status: SHIPPED | OUTPATIENT
Start: 2023-10-02

## 2023-10-02 RX ORDER — METHYLPREDNISOLONE 4 MG/1
TABLET ORAL
Qty: 21 TABLET | Refills: 0 | Status: SHIPPED | OUTPATIENT
Start: 2023-10-02

## 2023-10-02 RX ORDER — DEXTROMETHORPHAN HYDROBROMIDE AND PROMETHAZINE HYDROCHLORIDE 15; 6.25 MG/5ML; MG/5ML
5 SYRUP ORAL 3 TIMES DAILY PRN
Qty: 120 ML | Refills: 0 | Status: SHIPPED | OUTPATIENT
Start: 2023-10-02

## 2023-10-02 NOTE — PROGRESS NOTES
CHIEF COMPLAINT  Chief Complaint   Patient presents with    Cough    Wheezing         HPI  Marlon Mccarthy is a 43 y.o. male  presents with complaint of productive cough and wheezing starting over the weekend and now worsening. He gets this often when he gets sick. He usually gets a Zpack, steroids and promethazine DM. He has not taken a COVID-19 test, but has on at home and will take it now.     Review of Systems   Constitutional:  Negative for chills, diaphoresis, fatigue and fever.   HENT:  Negative for congestion, postnasal drip, sinus pressure, sinus pain, sneezing and sore throat.    Respiratory:  Positive for cough, chest tightness and wheezing. Negative for shortness of breath.    Cardiovascular:  Negative for chest pain.   Gastrointestinal:  Negative for diarrhea, nausea and vomiting.   Musculoskeletal:  Negative for myalgias.   Neurological:  Negative for headaches.     Past Medical History:   Diagnosis Date    Anxiety     Diverticulitis of colon     Diverticulosis     Hypertension     Non morbid obesity due to excess calories 05/16/2017    SVT (supraventricular tachycardia)        Family History   Problem Relation Age of Onset    Diabetes Mother     Hypertension Mother     Stroke Mother     Hypertension Father     No Known Problems Sister     No Known Problems Sister     No Known Problems Maternal Grandmother     No Known Problems Maternal Grandfather     No Known Problems Paternal Grandmother     No Known Problems Paternal Grandfather        Social History     Socioeconomic History    Marital status:    Tobacco Use    Smoking status: Never     Passive exposure: Never    Smokeless tobacco: Never   Vaping Use    Vaping Use: Never used   Substance and Sexual Activity    Alcohol use: Yes     Alcohol/week: 7.0 standard drinks     Types: 7 Cans of beer per week     Comment: social    Drug use: No    Sexual activity: Not Currently     Partners: Female     Birth control/protection: Condom       Marlon CABEZAS  Fabio  reports that he has never smoked. He has never been exposed to tobacco smoke. He has never used smokeless tobacco..      There were no vitals taken for this visit.    PHYSICAL EXAM  Physical Exam   Constitutional: He is oriented to person, place, and time. He appears well-developed and well-nourished. He does not have a sickly appearance. He does not appear ill. No distress.   HENT:   Head: Normocephalic and atraumatic.   Eyes: EOM are normal.   Neck: Neck normal appearance.  Pulmonary/Chest: Effort normal.  No respiratory distress.  Neurological: He is alert and oriented to person, place, and time.   Skin: Skin is dry.   Psychiatric: He has a normal mood and affect.         Diagnoses and all orders for this visit:    1. Lower respiratory infection (Primary)    Other orders  -     azithromycin (Zithromax Z-Zafar) 250 MG tablet; Take 2 tablets by mouth on day 1, then 1 tablet daily on days 2-5  Dispense: 6 tablet; Refill: 0  -     methylPREDNISolone (MEDROL) 4 MG dose pack; Take as directed on package instructions.  Dispense: 21 tablet; Refill: 0  -     promethazine-dextromethorphan (PROMETHAZINE-DM) 6.25-15 MG/5ML syrup; Take 5 mL by mouth 3 (Three) Times a Day As Needed for Cough.  Dispense: 120 mL; Refill: 0        The use of a video visit has been reviewed with the patient and verbal informed consent has been obtained. Myself and Marlon Mccarthy participated in this visit. The patient is located in [address] [place] [address]. I am located in [place], [place]. Mychart and Twilio were utilized.       Note Disclaimer: At [facility], we believe that sharing information builds trust and better   relationships. You are receiving this note because you recently visited [facility]. It is possible you   will see health information before a provider has talked with you about it. This kind of information can   be easy to misunderstand. To help you fully understand what it means for your health, we  urge you to   discuss this note with your provider.    Ofelia Omalley, KARLA  10/02/2023  08:22 EDT

## 2023-10-02 NOTE — PATIENT INSTRUCTIONS
Drink plenty of water  Over the counter pain relievers okay   If symptoms do not improve in 3-5 days follow up with your primary care provider or urgent care  If symptoms worsen follow up with urgent care or the emergency room      Acute Bronchitis, Adult    Acute bronchitis is when air tubes in the lungs (bronchi) suddenly get swollen. The condition can make it hard for you to breathe. In adults, acute bronchitis usually goes away within 2 weeks. A cough caused by bronchitis may last up to 3 weeks. Smoking, allergies, and asthma can make the condition worse.  What are the causes?  Germs that cause cold and flu (viruses). The most common cause of this condition is the virus that causes the common cold.  Bacteria.  Substances that bother (irritate) the lungs, including:  Smoke from cigarettes and other types of tobacco.  Dust and pollen.  Fumes from chemicals, gases, or burned fuel.  Indoor or outdoor air pollution.  What increases the risk?  A weak body's defense system. This is also called the immune system.  Any condition that affects your lungs and breathing, such as asthma.  What are the signs or symptoms?  A cough.  Coughing up clear, yellow, or green mucus.  Making high-pitched whistling sounds when you breathe, most often when you breathe out (wheezing).  Runny or stuffy nose.  Having too much mucus in your lungs (chest congestion).  Shortness of breath.  Body aches.  A sore throat.  How is this treated?  Acute bronchitis may go away over time without treatment. Your doctor may tell you to:  Drink more fluids. This will help thin your mucus so it is easier to cough up.  Use a device that gets medicine into your lungs (inhaler).  Use a vaporizer or a humidifier. These are machines that add water to the air. This helps with coughing and poor breathing.  Take a medicine that thins mucus and helps clear it from your lungs.  Take a medicine that prevents or stops coughing.  It is not common to take an antibiotic  medicine for this condition.  Follow these instructions at home:    Take over-the-counter and prescription medicines only as told by your doctor.  Use an inhaler, vaporizer, or humidifier as told by your doctor.  Take two teaspoons (10 mL) of honey at bedtime. This helps lessen your coughing at night.  Drink enough fluid to keep your pee (urine) pale yellow.  Do not smoke or use any products that contain nicotine or tobacco. If you need help quitting, ask your doctor.  Get a lot of rest.  Return to your normal activities when your doctor says that it is safe.  Keep all follow-up visits.  How is this prevented?    Wash your hands often with soap and water for at least 20 seconds. If you cannot use soap and water, use hand .  Avoid contact with people who have cold symptoms.  Try not to touch your mouth, nose, or eyes with your hands.  Avoid breathing in smoke or chemical fumes.  Make sure to get the flu shot every year.  Contact a doctor if:  Your symptoms do not get better in 2 weeks.  You have trouble coughing up the mucus.  Your cough keeps you awake at night.  You have a fever.  Get help right away if:  You cough up blood.  You have chest pain.  You have very bad shortness of breath.  You faint or keep feeling like you are going to faint.  You have a very bad headache.  Your fever or chills get worse.  These symptoms may be an emergency. Get help right away. Call your local emergency services (911 in the [place]  Do not wait to see if the symptoms will go away.  Do not drive yourself to the hospital.  Summary  Acute bronchitis is when air tubes in the lungs (bronchi) suddenly get swollen. In adults, acute bronchitis usually goes away within 2 weeks.  Drink more fluids. This will help thin your mucus so it is easier to cough up.  Take over-the-counter and prescription medicines only as told by your doctor.  Contact a doctor if your symptoms do not improve after 2 weeks of treatment.  This information is  not intended to replace advice given to you by your health care provider. Make sure you discuss any questions you have with your health care provider.  Document Revised: 04/20/2022 Document Reviewed: 04/20/2022  Elsevier Patient Education © 2023 [employer]       Community-Acquired Pneumonia, Adult  Pneumonia is a lung infection that causes inflammation and the buildup of mucus and fluids in the lungs. This may cause coughing and difficulty breathing. Community-acquired pneumonia is pneumonia that develops in people who are not, and have not recently been, in a hospital or other health care facility.  Usually, pneumonia develops as a result of an illness that is caused by a virus, such as the common cold and the flu (influenza). It can also be caused by bacteria or fungi. While the common cold and influenza can pass from person to person (are contagious), pneumonia itself is not considered contagious.  What are the causes?  This condition may be caused by:  Viruses.  Bacteria.  Fungi.  What increases the risk?  The following factors may make you more likely to develop this condition:  Being over age 65 or having certain medical conditions, such as:  A long-term (chronic) disease, such as: chronic obstructive pulmonary disease (COPD), asthma, heart failure, diabetes, or kidney disease.  A condition that increases the risk of breathing in (aspirating) mucus and other fluids from your mouth and nose.  A weakened body defense system (immune system).  Having had your spleen removed (splenectomy). The spleen is the organ that helps fight germs and infections.  Not cleaning your teeth and gums well (poor dental hygiene).  Using tobacco products.  Traveling to places where germs that cause pneumonia are present or being near certain animals or animal habitats that could have germs that cause pneumonia.  What are the signs or symptoms?  Symptoms of this condition include:  A dry cough or a wet (productive) cough.  A fever,  sweating, or chills.  Chest pain, especially when breathing deeply or coughing.  Fast breathing, difficulty breathing, or shortness of breath.  Tiredness (fatigue) and muscle aches.  How is this diagnosed?  This condition may be diagnosed based on your medical history or a physical exam. You may also have tests, including:  Imaging, such as a chest X-ray or lung ultrasound.  Tests of:  The level of oxygen and other gases in your blood.  Mucus from your lungs (sputum).  Fluid around your lungs (pleural fluid).  Your urine.  How is this treated?  Treatment for this condition depends on many factors, such as the cause of your pneumonia, your medicines, and other medical conditions that you have.  For most adults, pneumonia may be treated at home. In some cases, treatment must happen in a hospital and may include:  Medicines that are given by mouth (orally) or through an IV, including:  Antibiotic medicines, if bacteria caused the pneumonia.  Medicines that kill viruses (antiviral medicines), if a virus caused the pneumonia.  Oxygen therapy.  Severe pneumonia, although rare, may require the following treatments:  Mechanical ventilation.This procedure uses a machine to help you breathe if you cannot breathe well on your own or maintain a safe level of blood oxygen.  Thoracentesis. This procedure removes any buildup of pleural fluid to help with breathing.  Follow these instructions at home:  Medicines  Take over-the-counter and prescription medicines only as told by your health care provider.  Take cough medicine only if you have trouble sleeping. Cough medicine can prevent your body from removing mucus from your lungs.  If you were prescribed antibiotics, take them as told by your health care provider. Do not stop taking the antibiotic even if you start to feel better.  Lifestyle     Do not drink alcohol.  Do not use any products that contain nicotine or tobacco. These products include cigarettes, chewing tobacco, and  vaping devices, such as e-cigarettes. If you need help quitting, ask your health care provider.  Eat a healthy diet. This includes plenty of vegetables, fruits, whole grains, low-fat dairy products, and lean protein.  General instructions  Rest a lot and get at least 8 hours of sleep each night.  Sleep in a partly upright position at night. Place a few pillows under your head or sleep in a reclining chair.  Return to your normal activities as told by your health care provider. Ask your health care provider what activities are safe for you.  Drink enough fluid to keep your urine pale yellow. This helps to thin the mucus in your lungs.  If your throat is sore, gargle with a mixture of salt and water 3-4 times a day or as needed. To make salt water, completely dissolve ½-1 tsp (3-6 g) of salt in 1 cup (237 mL) of warm water.  Keep all follow-up visits.  How is this prevented?  You can lower your risk of developing community-acquired pneumonia by:  Getting the pneumonia vaccine. There are different types and schedules of pneumonia vaccines. Ask your health care provider which option is best for you. Consider getting the pneumonia vaccine if:  You are older than 65 years of age.  You are 19-65 years of age and are receiving cancer treatment, have chronic lung disease, or have other medical conditions that affect your immune system. Ask your health care provider if this applies to you.  Getting your influenza vaccine every year. Ask your health care provider which type of vaccine is best for you.  Getting regular dental checkups.  Washing your hands often with soap and water for at least 20 seconds. If soap and water are not available, use hand .  Contact a health care provider if:  You have a fever.  You have trouble sleeping because you cannot control your cough with cough medicine.  Get help right away if:  Your shortness of breath becomes worse.  Your chest pain increases.  Your sickness becomes worse,  especially if you are an older adult or have a weak immune system.  You cough up blood.  These symptoms may be an emergency. Get help right away. Call 911.  Do not wait to see if the symptoms will go away.  Do not drive yourself to the hospital.  Summary  Pneumonia is an infection of the lungs.  Community-acquired pneumonia develops in people who have not been in the hospital. It can be caused by bacteria, viruses, or fungi.  This condition may be treated with antibiotics or antiviral medicines.  Severe pneumonia may require a hospital stay and treatment to help with breathing.  This information is not intended to replace advice given to you by your health care provider. Make sure you discuss any questions you have with your health care provider.  Document Revised: 02/15/2023 Document Reviewed: 02/15/2023  Elsevier Patient Education © 2023 [employer] Inc.

## 2023-12-03 ENCOUNTER — APPOINTMENT (OUTPATIENT)
Dept: GENERAL RADIOLOGY | Facility: HOSPITAL | Age: 43
End: 2023-12-03
Payer: COMMERCIAL

## 2023-12-03 ENCOUNTER — HOSPITAL ENCOUNTER (EMERGENCY)
Facility: HOSPITAL | Age: 43
Discharge: HOME | End: 2023-12-04
Attending: INTERNAL MEDICINE | Admitting: EMERGENCY MEDICINE
Payer: COMMERCIAL

## 2023-12-03 DIAGNOSIS — I10 CHRONIC HYPERTENSION: ICD-10-CM

## 2023-12-03 DIAGNOSIS — I47.10 SVT (SUPRAVENTRICULAR TACHYCARDIA): Primary | ICD-10-CM

## 2023-12-03 LAB
ALBUMIN SERPL-MCNC: 3.9 G/DL (ref 3.5–5.2)
ALBUMIN/GLOB SERPL: 0.8 G/DL
ALP SERPL-CCNC: 111 U/L (ref 39–117)
ALT SERPL W P-5'-P-CCNC: 40 U/L (ref 1–41)
ANION GAP SERPL CALCULATED.3IONS-SCNC: 12 MMOL/L (ref 5–15)
AST SERPL-CCNC: 53 U/L (ref 1–40)
BASOPHILS # BLD AUTO: 0.08 10*3/MM3 (ref 0–0.2)
BASOPHILS NFR BLD AUTO: 0.6 % (ref 0–1.5)
BILIRUB SERPL-MCNC: 0.4 MG/DL (ref 0–1.2)
BUN SERPL-MCNC: 11 MG/DL (ref 6–20)
BUN/CREAT SERPL: 9.5 (ref 7–25)
CALCIUM SPEC-SCNC: 9.5 MG/DL (ref 8.6–10.5)
CHLORIDE SERPL-SCNC: 99 MMOL/L (ref 98–107)
CO2 SERPL-SCNC: 27 MMOL/L (ref 22–29)
CREAT SERPL-MCNC: 1.16 MG/DL (ref 0.76–1.27)
D DIMER PPP FEU-MCNC: 0.33 MCGFEU/ML (ref 0–0.5)
DEPRECATED RDW RBC AUTO: 44.7 FL (ref 37–54)
EGFRCR SERPLBLD CKD-EPI 2021: 79.6 ML/MIN/1.73
EOSINOPHIL # BLD AUTO: 0.25 10*3/MM3 (ref 0–0.4)
EOSINOPHIL NFR BLD AUTO: 1.9 % (ref 0.3–6.2)
ERYTHROCYTE [DISTWIDTH] IN BLOOD BY AUTOMATED COUNT: 14 % (ref 12.3–15.4)
GLOBULIN UR ELPH-MCNC: 4.6 GM/DL
GLUCOSE SERPL-MCNC: 181 MG/DL (ref 65–99)
HCT VFR BLD AUTO: 51.8 % (ref 37.5–51)
HGB BLD-MCNC: 15.8 G/DL (ref 13–17.7)
IMM GRANULOCYTES # BLD AUTO: 0.06 10*3/MM3 (ref 0–0.05)
IMM GRANULOCYTES NFR BLD AUTO: 0.5 % (ref 0–0.5)
LYMPHOCYTES # BLD AUTO: 3.48 10*3/MM3 (ref 0.7–3.1)
LYMPHOCYTES NFR BLD AUTO: 26.9 % (ref 19.6–45.3)
MCH RBC QN AUTO: 26.7 PG (ref 26.6–33)
MCHC RBC AUTO-ENTMCNC: 30.5 G/DL (ref 31.5–35.7)
MCV RBC AUTO: 87.5 FL (ref 79–97)
MONOCYTES # BLD AUTO: 0.98 10*3/MM3 (ref 0.1–0.9)
MONOCYTES NFR BLD AUTO: 7.6 % (ref 5–12)
NEUTROPHILS NFR BLD AUTO: 62.5 % (ref 42.7–76)
NEUTROPHILS NFR BLD AUTO: 8.09 10*3/MM3 (ref 1.7–7)
NRBC BLD AUTO-RTO: 0 /100 WBC (ref 0–0.2)
PLATELET # BLD AUTO: 269 10*3/MM3 (ref 140–450)
PMV BLD AUTO: 10 FL (ref 6–12)
POTASSIUM SERPL-SCNC: 3.5 MMOL/L (ref 3.5–5.2)
PROT SERPL-MCNC: 8.5 G/DL (ref 6–8.5)
RBC # BLD AUTO: 5.92 10*6/MM3 (ref 4.14–5.8)
SODIUM SERPL-SCNC: 138 MMOL/L (ref 136–145)
TROPONIN T SERPL HS-MCNC: 27 NG/L
WBC NRBC COR # BLD AUTO: 12.94 10*3/MM3 (ref 3.4–10.8)

## 2023-12-03 PROCEDURE — 96375 TX/PRO/DX INJ NEW DRUG ADDON: CPT

## 2023-12-03 PROCEDURE — 93005 ELECTROCARDIOGRAM TRACING: CPT | Performed by: INTERNAL MEDICINE

## 2023-12-03 PROCEDURE — 83880 ASSAY OF NATRIURETIC PEPTIDE: CPT | Performed by: EMERGENCY MEDICINE

## 2023-12-03 PROCEDURE — 84484 ASSAY OF TROPONIN QUANT: CPT | Performed by: INTERNAL MEDICINE

## 2023-12-03 PROCEDURE — 80053 COMPREHEN METABOLIC PANEL: CPT | Performed by: INTERNAL MEDICINE

## 2023-12-03 PROCEDURE — 96374 THER/PROPH/DIAG INJ IV PUSH: CPT

## 2023-12-03 PROCEDURE — 71045 X-RAY EXAM CHEST 1 VIEW: CPT

## 2023-12-03 PROCEDURE — 36415 COLL VENOUS BLD VENIPUNCTURE: CPT

## 2023-12-03 PROCEDURE — 85379 FIBRIN DEGRADATION QUANT: CPT | Performed by: INTERNAL MEDICINE

## 2023-12-03 PROCEDURE — 85025 COMPLETE CBC W/AUTO DIFF WBC: CPT | Performed by: INTERNAL MEDICINE

## 2023-12-03 PROCEDURE — 25010000002 ADENOSINE PER 6 MG: Performed by: INTERNAL MEDICINE

## 2023-12-03 PROCEDURE — 99284 EMERGENCY DEPT VISIT MOD MDM: CPT

## 2023-12-03 PROCEDURE — 25010000002 LORAZEPAM PER 2 MG

## 2023-12-03 RX ORDER — ADENOSINE 3 MG/ML
6 INJECTION, SOLUTION INTRAVENOUS ONCE
Status: DISCONTINUED | OUTPATIENT
Start: 2023-12-03 | End: 2023-12-04 | Stop reason: HOSPADM

## 2023-12-03 RX ORDER — LORAZEPAM 2 MG/ML
0.5 INJECTION INTRAMUSCULAR ONCE
Status: COMPLETED | OUTPATIENT
Start: 2023-12-03 | End: 2023-12-03

## 2023-12-03 RX ORDER — ADENOSINE 3 MG/ML
12 INJECTION, SOLUTION INTRAVENOUS ONCE
Status: COMPLETED | OUTPATIENT
Start: 2023-12-03 | End: 2023-12-03

## 2023-12-03 RX ORDER — LORAZEPAM 2 MG/ML
INJECTION INTRAMUSCULAR
Status: COMPLETED
Start: 2023-12-03 | End: 2023-12-03

## 2023-12-03 RX ADMIN — ADENOSINE 12 MG: 3 INJECTION INTRAVENOUS at 23:23

## 2023-12-03 RX ADMIN — LORAZEPAM 0.5 MG: 2 INJECTION INTRAMUSCULAR; INTRAVENOUS at 23:21

## 2023-12-03 RX ADMIN — LORAZEPAM 0.5 MG: 2 INJECTION INTRAMUSCULAR at 23:21

## 2023-12-03 NOTE — Clinical Note
[facility] EMERGENCY DEPARTMENT  [address] [place], [place] [address] [address]VE  [facility] [address]  Phone: [phone]    Marlon Mccarthy was seen and treated in our emergency department on 12/3/2023.  He may return to work on 12/04/2023.  Patient was seen here in the ER on 12/4/23.  He can go to work but I would recommend light duty today.       Thank you for choosing [facility].    Madeline Arzola MD

## 2023-12-04 VITALS
HEIGHT: 77 IN | WEIGHT: 315 LBS | SYSTOLIC BLOOD PRESSURE: 164 MMHG | TEMPERATURE: 98.9 F | DIASTOLIC BLOOD PRESSURE: 100 MMHG | RESPIRATION RATE: 24 BRPM | BODY MASS INDEX: 37.19 KG/M2 | HEART RATE: 88 BPM | OXYGEN SATURATION: 93 %

## 2023-12-04 LAB
FLUAV RNA RESP QL NAA+PROBE: NOT DETECTED
FLUBV RNA RESP QL NAA+PROBE: NOT DETECTED
GEN 5 2HR TROPONIN T REFLEX: 29 NG/L
HOLD SPECIMEN: NORMAL
HOLD SPECIMEN: NORMAL
NT-PROBNP SERPL-MCNC: 66.3 PG/ML (ref 0–450)
SARS-COV-2 RNA RESP QL NAA+PROBE: NOT DETECTED
TROPONIN T DELTA: 2 NG/L
WHOLE BLOOD HOLD COAG: NORMAL
WHOLE BLOOD HOLD SPECIMEN: NORMAL

## 2023-12-04 PROCEDURE — 87636 SARSCOV2 & INF A&B AMP PRB: CPT | Performed by: EMERGENCY MEDICINE

## 2023-12-04 PROCEDURE — 84484 ASSAY OF TROPONIN QUANT: CPT | Performed by: INTERNAL MEDICINE

## 2023-12-04 NOTE — ED PROVIDER NOTES
Subjective   History of Present Illness  43-year-old male who presents emergency department with SVT.  He states he has had a history of this in the past.  He follows with cardiology.  He states that normally he can vagal, and convert.  Unfortunately, this time he has not been able to do so.  He is very anxious.  He states normally when this happens he is with his wife.  He states unfortunately, his wife has been passed away for the last year and a half from a pulmonary embolism and he is very upset about this.  He works at [employer].  He states that he has average activity.  He has no chest pain.  He has minimal shortness of breath.  He has no acute bowel or kidney changes.  He states that he was just walking across the floor in the kitchen when this occurred.  There was no inciting event that he notes.    Review of Systems   Respiratory:  Positive for shortness of breath. Negative for cough.    Cardiovascular:  Positive for palpitations. Negative for chest pain.       Past Medical History:   Diagnosis Date    Anxiety     Diverticulitis of colon     Diverticulosis     Hypertension     Non morbid obesity due to excess calories 05/16/2017    SVT (supraventricular tachycardia)        Allergies   Allergen Reactions    Lisinopril Cough       Past Surgical History:   Procedure Laterality Date    COLONOSCOPY      HERNIA REPAIR      INGUINAL HERNIA REPAIR         Family History   Problem Relation Age of Onset    Diabetes Mother     Hypertension Mother     Stroke Mother     Hypertension Father     No Known Problems Sister     No Known Problems Sister     No Known Problems Maternal Grandmother     No Known Problems Maternal Grandfather     No Known Problems Paternal Grandmother     No Known Problems Paternal Grandfather        Social History     Socioeconomic History    Marital status:    Tobacco Use    Smoking status: Never     Passive exposure: Never    Smokeless tobacco: Never   Vaping Use    Vaping Use: Never used    Substance and Sexual Activity    Alcohol use: Yes     Alcohol/week: 7.0 standard drinks of alcohol     Types: 7 Cans of beer per week     Comment: social    Drug use: No    Sexual activity: Not Currently     Partners: Female     Birth control/protection: Condom           Objective   Physical Exam  Vitals reviewed.   Constitutional:       Appearance: He is obese. He is not ill-appearing.   HENT:      Head: Normocephalic and atraumatic.      Right Ear: External ear normal.      Left Ear: External ear normal.      Nose: Nose normal.   Eyes:      General: No scleral icterus.     Conjunctiva/sclera: Conjunctivae normal.   Cardiovascular:      Rate and Rhythm: Regular rhythm. Tachycardia present.      Heart sounds: Normal heart sounds.   Pulmonary:      Effort: Pulmonary effort is normal.      Breath sounds: Normal breath sounds.   Abdominal:      General: There is no distension.      Palpations: Abdomen is soft.   Musculoskeletal:         General: No tenderness.      Cervical back: Normal range of motion and neck supple.      Comments: Chronic lower extremity lymphedema.   Skin:     General: Skin is warm and dry.   Neurological:      General: No focal deficit present.      Mental Status: He is alert.      Cranial Nerves: No cranial nerve deficit.   Psychiatric:      Comments: Anxious.         Procedures           ED Course                                             Medical Decision Making  Amount and/or Complexity of Data Reviewed  Labs: ordered.     Details: CBC CMP troponin D-dimer  Radiology: ordered.     Details: Chest x-ray  ECG/medicine tests: ordered.    Risk  Prescription drug management.        Final diagnoses:   None       ED Disposition  ED Disposition       None            No follow-up provider specified.       Medication List      No changes were made to your prescriptions during this visit.            Yeyo Omer DO  12/04/23 [id number]

## 2023-12-04 NOTE — ED PROVIDER NOTES
Subjective   History of Present Illness this is an addendum to Dr. Coker's note.  Please see her note for complete history and physical.    Review of Systems    Past Medical History:   Diagnosis Date    Anxiety     Diverticulitis of colon     Diverticulosis     Hypertension     Non morbid obesity due to excess calories 05/16/2017    SVT (supraventricular tachycardia)        Allergies   Allergen Reactions    Lisinopril Cough       Past Surgical History:   Procedure Laterality Date    COLONOSCOPY      HERNIA REPAIR      INGUINAL HERNIA REPAIR         Family History   Problem Relation Age of Onset    Diabetes Mother     Hypertension Mother     Stroke Mother     Hypertension Father     No Known Problems Sister     No Known Problems Sister     No Known Problems Maternal Grandmother     No Known Problems Maternal Grandfather     No Known Problems Paternal Grandmother     No Known Problems Paternal Grandfather        Social History     Socioeconomic History    Marital status:    Tobacco Use    Smoking status: Never     Passive exposure: Never    Smokeless tobacco: Never   Vaping Use    Vaping Use: Never used   Substance and Sexual Activity    Alcohol use: Yes     Alcohol/week: 7.0 standard drinks of alcohol     Types: 7 Cans of beer per week     Comment: social    Drug use: No    Sexual activity: Not Currently     Partners: Female     Birth control/protection: Condom           Objective   Physical Exam    Procedures           ED Course      Lab Results (last 24 hours)       Procedure Component Value Units Date/Time    CBC & Differential [[id number]]  (Abnormal) Collected: 12/03/23 2319    Specimen: Blood Updated: 12/03/23 2344    Narrative:      The following orders were created for panel order CBC & Differential.  Procedure                               Abnormality         Status                     ---------                               -----------         ------                     CBC Auto  Differential[[id number]]        Abnormal            Final result                 Please view results for these tests on the individual orders.    Comprehensive Metabolic Panel [[id number]]  (Abnormal) Collected: 12/03/23 2319    Specimen: Blood Updated: 12/03/23 2359     Glucose 181 mg/dL      BUN 11 mg/dL      Creatinine 1.16 mg/dL      Sodium 138 mmol/L      Potassium 3.5 mmol/L      Comment: Slight hemolysis detected by analyzer. Result may be falsely elevated.        Chloride 99 mmol/L      CO2 27.0 mmol/L      Calcium 9.5 mg/dL      Total Protein 8.5 g/dL      Albumin 3.9 g/dL      ALT (SGPT) 40 U/L      AST (SGOT) 53 U/L      Alkaline Phosphatase 111 U/L      Total Bilirubin 0.4 mg/dL      Globulin 4.6 gm/dL      A/G Ratio 0.8 g/dL      BUN/Creatinine Ratio 9.5     Anion Gap 12.0 mmol/L      eGFR 79.6 mL/min/1.73     Narrative:      GFR Normal >60  Chronic Kidney Disease <60  Kidney Failure <15      High Sensitivity Troponin T [[id number]]  (Abnormal) Collected: 12/03/23 2319    Specimen: Blood Updated: 12/03/23 2357     HS Troponin T 27 ng/L     Narrative:      High Sensitive Troponin T Reference Range:  <14.0 ng/L- Negative Female for AMI  <22.0 ng/L- Negative Male for AMI  >=14 - Abnormal Female indicating possible myocardial injury.  >=22 - Abnormal Male indicating possible myocardial injury.   Clinicians would have to utilize clinical acumen, EKG, Troponin, and serial changes to determine if it is an Acute Myocardial Infarction or myocardial injury due to an underlying chronic condition.         D-dimer, Quantitative [[id number]]  (Normal) Collected: 12/03/23 2319    Specimen: Blood Updated: 12/03/23 2355     D-Dimer, Quantitative 0.33 MCGFEU/mL     Narrative:      According to the assay 's published package insert, a normal (<0.50 MCGFEU/mL) D-dimer result in conjunction with a non-high clinical probability assessment, excludes deep vein thrombosis (DVT) and pulmonary embolism (PE) with high  "sensitivity.    D-dimer values increase with age and this can make VTE exclusion of an older population difficult. To address this, the [employer], based on best available evidence and recent guidelines, recommends that clinicians use age-adjusted D-dimer thresholds in patients greater than 50 years of age with: a) a low probability of PE who do not meet all Pulmonary Embolism Rule Out Criteria, or b) in those with intermediate probability of PE.   The formula for an age-adjusted D-dimer cut-off is \"age/100\".  For example, a 60 year old patient would have an age-adjusted cut-off of 0.60 MCGFEU/mL and an 80 year old 0.80 MCGFEU/mL.    CBC Auto Differential [[id number]]  (Abnormal) Collected: 12/03/23 2319    Specimen: Blood Updated: 12/03/23 2344     WBC 12.94 10*3/mm3      RBC 5.92 10*6/mm3      Hemoglobin 15.8 g/dL      Hematocrit 51.8 %      MCV 87.5 fL      MCH 26.7 pg      MCHC 30.5 g/dL      RDW 14.0 %      RDW-SD 44.7 fl      MPV 10.0 fL      Platelets 269 10*3/mm3      Neutrophil % 62.5 %      Lymphocyte % 26.9 %      Monocyte % 7.6 %      Eosinophil % 1.9 %      Basophil % 0.6 %      Immature Grans % 0.5 %      Neutrophils, Absolute 8.09 10*3/mm3      Lymphocytes, Absolute 3.48 10*3/mm3      Monocytes, Absolute 0.98 10*3/mm3      Eosinophils, Absolute 0.25 10*3/mm3      Basophils, Absolute 0.08 10*3/mm3      Immature Grans, Absolute 0.06 10*3/mm3      nRBC 0.0 /100 WBC     BNP [[id number]]  (Normal) Collected: 12/03/23 2319    Specimen: Blood Updated: 12/04/23 0058     proBNP 66.3 pg/mL     Narrative:      This assay is used as an aid in the diagnosis of individuals suspected of having heart failure. It can be used as an aid in the diagnosis of acute decompensated heart failure (ADHF) in patients presenting with signs and symptoms of ADHF to the emergency department (ED). In addition, NT-proBNP of <300 pg/mL indicates ADHF is not likely.    Age Range Result Interpretation  NT-proBNP " Concentration (pg/mL:      <50             Positive            >450                   Gray                 300-450                    Negative             <300    50-75           Positive            >900                  Gray                300-900                  Negative            <300      >75             Positive            >1800                  Gray                300-1800                  Negative            <300    High Sensitivity Troponin T 2Hr [[id number]]  (Abnormal) Collected: 12/04/23 0058    Specimen: Blood Updated: 12/04/23 0121     HS Troponin T 29 ng/L      Troponin T Delta 2 ng/L     Narrative:      High Sensitive Troponin T Reference Range:  <14.0 ng/L- Negative Female for AMI  <22.0 ng/L- Negative Male for AMI  >=14 - Abnormal Female indicating possible myocardial injury.  >=22 - Abnormal Male indicating possible myocardial injury.   Clinicians would have to utilize clinical acumen, EKG, Troponin, and serial changes to determine if it is an Acute Myocardial Infarction or myocardial injury due to an underlying chronic condition.         COVID-19 and FLU A/B PCR, 1 HR TAT - Swab, Nasopharynx [[id number]]  (Normal) Collected: 12/04/23 0058    Specimen: Swab from Nasopharynx Updated: 12/04/23 0135     COVID19 Not Detected     Influenza A PCR Not Detected     Influenza B PCR Not Detected    Narrative:      Fact sheet for providers: https://www.fda.gov/media/311963/download    Fact sheet for patients: https://www.fda.gov/media/054193/download    Test performed by PCR.           XR Chest 1 View    (Results Pending)                                        Medical Decision Making  Patient arrived to the ER in SVT.  He has a history of SVT and follows with Dr. Hector with cardiology.  Patient was given adenosine and converted to sinus rhythm.  Labs showed hyperglycemia, leukocytosis and a mildly elevated troponin which is consistent with SVT.  Second troponin showed a delta of 2.  Patient was resting  comfortably here in the ER.  He had been given a dose of Ativan.  Patient did have an episode of hypoxia while sleeping after being given Ativan but once the Ativan wore off and patient was awake his sats were in the upper 90s.  Chest x-ray showed no acute findings.  Patient was advised to continue his home medications.  He will be discharged home to follow-up with his PCP and Dr. Hector with cardiology.  He is to return to the ER for any palpitations, pain or other concerns.  Patient agreeable.  Patient was asymptomatic at discharge.    Problems Addressed:  Chronic hypertension: chronic illness or injury  SVT (supraventricular tachycardia): complicated acute illness or injury    Amount and/or Complexity of Data Reviewed  Labs: ordered. Decision-making details documented in ED Course.  Radiology: ordered. Decision-making details documented in ED Course.  ECG/medicine tests: ordered. Decision-making details documented in ED Course.    Risk  Prescription drug management.        Final diagnoses:   SVT (supraventricular tachycardia)   Chronic hypertension       ED Disposition  ED Disposition       ED Disposition   Discharge    Condition   Stable    Comment   --               Maile Araiza APRN  [address] [facility]  [facility] [place] [facility] [address]  [phone]    Schedule an appointment as soon as possible for a visit       Teddy Hector MD  [address] [facility] [address]  [facility] [address]  [phone]    Schedule an appointment as soon as possible for a visit            Medication List      No changes were made to your prescriptions during this visit.            Madeline Arzola MD  12/04/23 0214

## 2023-12-05 LAB
QT INTERVAL: 240 MS
QTC INTERVAL: 428 MS

## 2023-12-22 LAB
QT INTERVAL: 314 MS
QTC INTERVAL: 456 MS

## 2024-01-19 ENCOUNTER — HOSPITAL ENCOUNTER (EMERGENCY)
Facility: HOSPITAL | Age: 44
Discharge: HOME | End: 2024-01-19
Attending: FAMILY MEDICINE
Payer: COMMERCIAL

## 2024-01-19 VITALS
WEIGHT: 315 LBS | TEMPERATURE: 97.8 F | RESPIRATION RATE: 20 BRPM | HEIGHT: 77 IN | OXYGEN SATURATION: 91 % | DIASTOLIC BLOOD PRESSURE: 88 MMHG | HEART RATE: 97 BPM | SYSTOLIC BLOOD PRESSURE: 157 MMHG | BODY MASS INDEX: 37.19 KG/M2

## 2024-01-19 DIAGNOSIS — I47.10 SUPRAVENTRICULAR TACHYCARDIA: Primary | ICD-10-CM

## 2024-01-19 LAB
ALBUMIN SERPL-MCNC: 3.6 G/DL (ref 3.5–5.2)
ALBUMIN/GLOB SERPL: 0.8 G/DL
ALP SERPL-CCNC: 104 U/L (ref 39–117)
ALT SERPL W P-5'-P-CCNC: 51 U/L (ref 1–41)
ANION GAP SERPL CALCULATED.3IONS-SCNC: 11 MMOL/L (ref 5–15)
AST SERPL-CCNC: 63 U/L (ref 1–40)
BASOPHILS # BLD AUTO: 0.05 10*3/MM3 (ref 0–0.2)
BASOPHILS NFR BLD AUTO: 0.5 % (ref 0–1.5)
BILIRUB SERPL-MCNC: 0.6 MG/DL (ref 0–1.2)
BUN SERPL-MCNC: 11 MG/DL (ref 6–20)
BUN/CREAT SERPL: 12.1 (ref 7–25)
CALCIUM SPEC-SCNC: 9.2 MG/DL (ref 8.6–10.5)
CHLORIDE SERPL-SCNC: 98 MMOL/L (ref 98–107)
CO2 SERPL-SCNC: 28 MMOL/L (ref 22–29)
CREAT SERPL-MCNC: 0.91 MG/DL (ref 0.76–1.27)
DEPRECATED RDW RBC AUTO: 46.7 FL (ref 37–54)
EGFRCR SERPLBLD CKD-EPI 2021: 105.9 ML/MIN/1.73
EOSINOPHIL # BLD AUTO: 0.26 10*3/MM3 (ref 0–0.4)
EOSINOPHIL NFR BLD AUTO: 2.4 % (ref 0.3–6.2)
ERYTHROCYTE [DISTWIDTH] IN BLOOD BY AUTOMATED COUNT: 14.6 % (ref 12.3–15.4)
GLOBULIN UR ELPH-MCNC: 4.3 GM/DL
GLUCOSE SERPL-MCNC: 215 MG/DL (ref 65–99)
HCT VFR BLD AUTO: 48.4 % (ref 37.5–51)
HGB BLD-MCNC: 14.8 G/DL (ref 13–17.7)
HOLD SPECIMEN: NORMAL
IMM GRANULOCYTES # BLD AUTO: 0.05 10*3/MM3 (ref 0–0.05)
IMM GRANULOCYTES NFR BLD AUTO: 0.5 % (ref 0–0.5)
LYMPHOCYTES # BLD AUTO: 3.18 10*3/MM3 (ref 0.7–3.1)
LYMPHOCYTES NFR BLD AUTO: 29 % (ref 19.6–45.3)
MCH RBC QN AUTO: 26.9 PG (ref 26.6–33)
MCHC RBC AUTO-ENTMCNC: 30.6 G/DL (ref 31.5–35.7)
MCV RBC AUTO: 88 FL (ref 79–97)
MONOCYTES # BLD AUTO: 0.79 10*3/MM3 (ref 0.1–0.9)
MONOCYTES NFR BLD AUTO: 7.2 % (ref 5–12)
NEUTROPHILS NFR BLD AUTO: 6.65 10*3/MM3 (ref 1.7–7)
NEUTROPHILS NFR BLD AUTO: 60.4 % (ref 42.7–76)
NRBC BLD AUTO-RTO: 0 /100 WBC (ref 0–0.2)
PLATELET # BLD AUTO: 271 10*3/MM3 (ref 140–450)
PMV BLD AUTO: 10.2 FL (ref 6–12)
POTASSIUM SERPL-SCNC: 3.7 MMOL/L (ref 3.5–5.2)
PROT SERPL-MCNC: 7.9 G/DL (ref 6–8.5)
RBC # BLD AUTO: 5.5 10*6/MM3 (ref 4.14–5.8)
SODIUM SERPL-SCNC: 137 MMOL/L (ref 136–145)
TROPONIN T SERPL HS-MCNC: 31 NG/L
TROPONIN T SERPL HS-MCNC: 42 NG/L
WBC NRBC COR # BLD AUTO: 10.98 10*3/MM3 (ref 3.4–10.8)
WHOLE BLOOD HOLD COAG: NORMAL
WHOLE BLOOD HOLD SPECIMEN: NORMAL

## 2024-01-19 PROCEDURE — 36415 COLL VENOUS BLD VENIPUNCTURE: CPT

## 2024-01-19 PROCEDURE — 93010 ELECTROCARDIOGRAM REPORT: CPT | Performed by: INTERNAL MEDICINE

## 2024-01-19 PROCEDURE — 93005 ELECTROCARDIOGRAM TRACING: CPT | Performed by: FAMILY MEDICINE

## 2024-01-19 PROCEDURE — 84484 ASSAY OF TROPONIN QUANT: CPT | Performed by: FAMILY MEDICINE

## 2024-01-19 PROCEDURE — 99283 EMERGENCY DEPT VISIT LOW MDM: CPT

## 2024-01-19 PROCEDURE — 85025 COMPLETE CBC W/AUTO DIFF WBC: CPT | Performed by: FAMILY MEDICINE

## 2024-01-19 PROCEDURE — 25010000002 ADENOSINE PER 6 MG: Performed by: FAMILY MEDICINE

## 2024-01-19 PROCEDURE — 96374 THER/PROPH/DIAG INJ IV PUSH: CPT

## 2024-01-19 PROCEDURE — 80053 COMPREHEN METABOLIC PANEL: CPT | Performed by: FAMILY MEDICINE

## 2024-01-19 PROCEDURE — 84484 ASSAY OF TROPONIN QUANT: CPT | Performed by: EMERGENCY MEDICINE

## 2024-01-19 RX ORDER — ADENOSINE 3 MG/ML
12 INJECTION, SOLUTION INTRAVENOUS ONCE
Status: COMPLETED | OUTPATIENT
Start: 2024-01-19 | End: 2024-01-19

## 2024-01-19 RX ORDER — ONDANSETRON 2 MG/ML
4 INJECTION INTRAMUSCULAR; INTRAVENOUS ONCE
Status: DISCONTINUED | OUTPATIENT
Start: 2024-01-19 | End: 2024-01-19

## 2024-01-19 RX ADMIN — ADENOSINE 12 MG: 3 INJECTION INTRAVENOUS at 05:16

## 2024-01-19 NOTE — ED PROVIDER NOTES
Subjective   History of Present Illness this is an addendum to Dr. Baldwin's note.  Please see his note for complete history and physical.    Review of Systems    Past Medical History:   Diagnosis Date    Anxiety     Diverticulitis of colon     Diverticulosis     Hypertension     Non morbid obesity due to excess calories 05/16/2017    SVT (supraventricular tachycardia)        Allergies   Allergen Reactions    Lisinopril Cough       Past Surgical History:   Procedure Laterality Date    COLONOSCOPY      HERNIA REPAIR      INGUINAL HERNIA REPAIR         Family History   Problem Relation Age of Onset    Diabetes Mother     Hypertension Mother     Stroke Mother     Hypertension Father     No Known Problems Sister     No Known Problems Sister     No Known Problems Maternal Grandmother     No Known Problems Maternal Grandfather     No Known Problems Paternal Grandmother     No Known Problems Paternal Grandfather        Social History     Socioeconomic History    Marital status:    Tobacco Use    Smoking status: Never     Passive exposure: Never    Smokeless tobacco: Never   Vaping Use    Vaping Use: Never used   Substance and Sexual Activity    Alcohol use: Yes     Alcohol/week: 7.0 standard drinks of alcohol     Types: 7 Cans of beer per week     Comment: social    Drug use: No    Sexual activity: Not Currently     Partners: Female     Birth control/protection: Condom           Objective   Physical Exam    Procedures           ED Course      Lab Results (last 24 hours)       Procedure Component Value Units Date/Time    CBC & Differential [[id number]]  (Abnormal) Collected: 01/19/24 0510    Specimen: Blood Updated: 01/19/24 0630    Narrative:      The following orders were created for panel order CBC & Differential.  Procedure                               Abnormality         Status                     ---------                               -----------         ------                     CBC Auto  Differential[[id number]]        Abnormal            Final result                 Please view results for these tests on the individual orders.    Comprehensive Metabolic Panel [[id number]]  (Abnormal) Collected: 01/19/24 0510    Specimen: Blood Updated: 01/19/24 0634     Glucose 215 mg/dL      BUN 11 mg/dL      Creatinine 0.91 mg/dL      Sodium 137 mmol/L      Potassium 3.7 mmol/L      Comment: Slight hemolysis detected by analyzer. Result may be falsely elevated.        Chloride 98 mmol/L      CO2 28.0 mmol/L      Calcium 9.2 mg/dL      Total Protein 7.9 g/dL      Albumin 3.6 g/dL      ALT (SGPT) 51 U/L      AST (SGOT) 63 U/L      Alkaline Phosphatase 104 U/L      Total Bilirubin 0.6 mg/dL      Globulin 4.3 gm/dL      A/G Ratio 0.8 g/dL      BUN/Creatinine Ratio 12.1     Anion Gap 11.0 mmol/L      eGFR 105.9 mL/min/1.73     Narrative:      GFR Normal >60  Chronic Kidney Disease <60  Kidney Failure <15      Single High Sensitivity Troponin T [[id number]]  (Abnormal) Collected: 01/19/24 0510    Specimen: Blood Updated: 01/19/24 0631     HS Troponin T 31 ng/L     Narrative:      High Sensitive Troponin T Reference Range:  <14.0 ng/L- Negative Female for AMI  <22.0 ng/L- Negative Male for AMI  >=14 - Abnormal Female indicating possible myocardial injury.  >=22 - Abnormal Male indicating possible myocardial injury.   Clinicians would have to utilize clinical acumen, EKG, Troponin, and serial changes to determine if it is an Acute Myocardial Infarction or myocardial injury due to an underlying chronic condition.         CBC Auto Differential [[id number]]  (Abnormal) Collected: 01/19/24 0510    Specimen: Blood Updated: 01/19/24 0630     WBC 10.98 10*3/mm3      RBC 5.50 10*6/mm3      Hemoglobin 14.8 g/dL      Hematocrit 48.4 %      MCV 88.0 fL      MCH 26.9 pg      MCHC 30.6 g/dL      RDW 14.6 %      RDW-SD 46.7 fl      MPV 10.2 fL      Platelets 271 10*3/mm3      Neutrophil % 60.4 %      Lymphocyte % 29.0 %      Monocyte %  7.2 %      Eosinophil % 2.4 %      Basophil % 0.5 %      Immature Grans % 0.5 %      Neutrophils, Absolute 6.65 10*3/mm3      Lymphocytes, Absolute 3.18 10*3/mm3      Monocytes, Absolute 0.79 10*3/mm3      Eosinophils, Absolute 0.26 10*3/mm3      Basophils, Absolute 0.05 10*3/mm3      Immature Grans, Absolute 0.05 10*3/mm3      nRBC 0.0 /100 WBC     Single High Sensitivity Troponin T [[id number]]  (Abnormal) Collected: 01/19/24 0742    Specimen: Blood Updated: 01/19/24 0805     HS Troponin T 42 ng/L     Narrative:      High Sensitive Troponin T Reference Range:  <14.0 ng/L- Negative Female for AMI  <22.0 ng/L- Negative Male for AMI  >=14 - Abnormal Female indicating possible myocardial injury.  >=22 - Abnormal Male indicating possible myocardial injury.   Clinicians would have to utilize clinical acumen, EKG, Troponin, and serial changes to determine if it is an Acute Myocardial Infarction or myocardial injury due to an underlying chronic condition.                No orders to display                                           Medical Decision Making  Patient presented to the ER in SVT.  Patient has a history of SVT and currently takes carvedilol.  He follows with Dr. Hector with cardiology.  Patient states this is identical to his previous SVT episodes.  He was given adenosine and converted to normal sinus rhythm.  Patient has been asymptomatic since conversion.  He denies any chest pain or shortness of air.  Labs showed leukocytosis, hyperglycemia, mildly elevated ALT and AST and troponin x 2 was elevated.  With the elevated troponins, I discussed the case with Dr. Henson.  Since we have a cause for the elevated troponin and patient is asymptomatic, there is no need for further workup or admission at this time.  Patient will be discharged home to follow-up with his PCP and cardiologist.  He is to return for any chest pain, shortness of air, arrhythmias or other concerns.  Patient agreeable.    Problems  Addressed:  Supraventricular tachycardia: complicated acute illness or injury    Amount and/or Complexity of Data Reviewed  Labs: ordered. Decision-making details documented in ED Course.  ECG/medicine tests: ordered.  Discussion of management or test interpretation with external provider(s): Dr. Henson with cardiology    Risk  Prescription drug management.        Final diagnoses:   Supraventricular tachycardia       ED Disposition  ED Disposition       ED Disposition   Discharge    Condition   Good    Comment   --               Maile Araiza APRN  [address] [facility]  [place] [address]  [phone]      As needed    Teddy Hector MD  [address] [facility] [address]  [place] [address]  [phone]    Schedule an appointment as soon as possible for a visit            Medication List      No changes were made to your prescriptions during this visit.            Madeline Arzola MD  01/19/24 0839       Madeline Arzola MD  01/19/24 0893

## 2024-01-19 NOTE — Clinical Note
[facility] EMERGENCY DEPARTMENT  [address] [place] [address] AVE  [facility] [address]  Phone: [phone]    Marlon Mccarthy was seen and treated in our emergency department on 1/19/2024.  He may return to work on 01/20/2024.         Thank you for choosing [facility].    Madeline Arzola MD

## 2024-01-19 NOTE — ED PROVIDER NOTES
HPI:     Patient is a 44-year-old -American male who has a history of SVTs usually 4-5 times a year who had it 3 months ago presents to the emergency room with a complaint of having palpitations again.  Patient states that he knows what brought it on.  He drank 40 ounces of Mountain Dew [place].  Patient states he normally stays away from the caffeine because this is one of the triggers.  No fevers chills night sweats no nausea vomiting diarrhea.  Patient states he feels tired.  Patient states he has not had any chest discomfort.  Patient states normally when he is converted he is good to go and normally goes home.      REVIEW OF SYSTEMS  CONSTITUTIONAL:  No complaints of fever, chills,or weakness  EYES:  No complaints of discharge   ENT: No complaints of sore throat or ear pain  CARDIOVASCULAR: Positive for palpitations  RESPIRATORY:  No complaints of cough or shortness of breath  GI:  No complaints of abdominal pain, nausea, vomiting, or diarrhea  MUSCULOSKELETAL:  No complaints of back pain  SKIN:  No complaints of rash  NEUROLOGIC:  No complaints of headache, focal weakness, or sensory changes  ENDOCRINE:  No complaints of polyuria or polydipsia  LYMPHATIC:  No complaints of swollen glands  GENITOURINARY: No complaints of urinary frequency or hematuria        PAST MEDICAL HISTORY  Past Medical History:   Diagnosis Date    Anxiety     Diverticulitis of colon     Diverticulosis     Hypertension     Non morbid obesity due to excess calories 05/16/2017    SVT (supraventricular tachycardia)        FAMILY HISTORY  Family History   Problem Relation Age of Onset    Diabetes Mother     Hypertension Mother     Stroke Mother     Hypertension Father     No Known Problems Sister     No Known Problems Sister     No Known Problems Maternal Grandmother     No Known Problems Maternal Grandfather     No Known Problems Paternal Grandmother     No Known Problems Paternal Grandfather        SOCIAL HISTORY  Social History      Socioeconomic History    Marital status:    Tobacco Use    Smoking status: Never     Passive exposure: Never    Smokeless tobacco: Never   Vaping Use    Vaping Use: Never used   Substance and Sexual Activity    Alcohol use: Yes     Alcohol/week: 7.0 standard drinks of alcohol     Types: 7 Cans of beer per week     Comment: social    Drug use: No    Sexual activity: Not Currently     Partners: Female     Birth control/protection: Condom       IMMUNIZATION HISTORY  Deferred to primary care physician.    SURGICAL HISTORY  Past Surgical History:   Procedure Laterality Date    COLONOSCOPY      HERNIA REPAIR      INGUINAL HERNIA REPAIR         CURRENT MEDICATIONS  No current facility-administered medications for this encounter.    Current Outpatient Medications:     albuterol sulfate  (90 Base) MCG/ACT inhaler, Inhale 2 puffs Every 4 (Four) Hours As Needed for Wheezing., Disp: , Rfl:     azithromycin (Zithromax Z-Zafar) 250 MG tablet, Take 2 tablets by mouth on day 1, then 1 tablet daily on days 2-5, Disp: 6 tablet, Rfl: 0    carvedilol (COREG) 25 MG tablet, Take 1 tablet by mouth 2 (Two) Times a Day With Meals., Disp: 60 tablet, Rfl: 5    cyclobenzaprine (FLEXERIL) 10 MG tablet, Take 1 tablet by mouth 3 (Three) Times a Day As Needed (back pain)., Disp: 30 tablet, Rfl: 3    furosemide (Lasix) 20 MG tablet, Take 1 tablet by mouth Daily for 60 days., Disp: 30 tablet, Rfl: 1    losartan (COZAAR) 50 MG tablet, Take 1 tablet by mouth Daily., Disp: , Rfl:     methylPREDNISolone (MEDROL) 4 MG dose pack, Take as directed on package instructions., Disp: 21 tablet, Rfl: 0    promethazine-dextromethorphan (PROMETHAZINE-DM) 6.25-15 MG/5ML syrup, Take 5 mL by mouth 3 (Three) Times a Day As Needed for Cough., Disp: 120 mL, Rfl: 0    ALLERGIES  Allergies   Allergen Reactions    Lisinopril Cough           Cardiac exam    VITAL SIGNS:  /84 (BP Location: Right arm, Patient Position: Sitting)   Pulse 117   Temp 97.8  "°F (36.6 °C) (Oral)   Resp 21   Ht 195.6 cm (77\")   Wt (!) 243 kg (535 lb)   SpO2 94%   BMI 63.44 kg/m²     Constitutional: Patient is alert and in no distress.  Patient with no current discomfort.    ENT: There is a normal pharynx with no acute erythema or exudate and oral mucosa is moist.  Nose is clear with no drainage.  Tympanic membranes intact and nonerythemic    Respiratory: Patient is clear to auscultation bilaterally with no wheezing or rhonchi.  Chest wall is nontender.  There are no external lesions on the chest.  There is no crepitance    Cardiovascular: S1-S2 tachycardic with a diastolic murmur 1 out of 5.    Abdomen: Soft, nontender, and  bowel sounds are normal in all 4 quadrants.  There is no rebound or guarding noted.  There is no abdominal distention or hepatosplenomegaly.    Genitourinary: Patient is voiding appropriately.    Integument: No acute lesions noted and color appears to be normal.    Sasser Coma Scale: Total score 15    Neurological: Patient is alert and oriented x4 and no acute findings noted.  Speech is fluent and cognition is normal.  No evidence of acute CVA.  Cranial nerves II through XII intact.  Patient with normal motor function as well as reflexes and sensation        RADIOLOGY/PROCEDURES      No orders to display          FUTURE APPOINTMENTS     No future appointments.       EKG (reviewed and interpreted by me): SVT tachycardic at 192 bpm.  No acute ST segment elevation or depression appreciated.      HEART SCORE     Patient history  1      Slightly suspicious (0 points)  2   ECG       Nonspecific repolarization disturbance (1 point)    3   Patient age       Between 45 and 65 (1 point)    4   Risk factors (Hypercholesterolemia, Hypertension, diabetes, smoking, obesity)     1 or 2 risk factors are present (1 point)    5   Troponin     3 times higher than normal or more (2 points)   1 to 3 times higher than normal (1 point)   Less than normal limit (0 points)     6 "     TOTAL RISK NUMBER: ***    The three risk categories are described below:  Heart score MACE risk Recommendation  0 - 3 Low (1.7%) Discharge can be an option.  4 - 6 Intermediate (20.3%) Clinical observation and further investigations.  7 - 10 High (72.2%) Immediate invasive treatment        COURSE & MEDICAL DECISION MAKING       Patient's partial differential diagnosis can include: Electrolyte abnormality, SVT, caffeine overstimulation, and other.    Patient was given 12 mg of IV adenosine rapid push.  Patient was immediately converted from SVT to sinus tachycardia with a rate of 112.      To believe that the patient can go home unless the patient has a significant electrolyte abnormalities such as hypokalemia, or hypocalcemia.  Patient has had this multiple times.  Patient would benefit from considering ablation if he continues to drink caffeine or have other stimulating medications or foods.    At this time the patient's laboratory tests have not resulted.Turned the patient over to oncoming physician Dr. Arzola For final disposition                 Patient's level of risk: Moderate        CRITICAL CARE    CRITICAL CARE: No    CRITICAL CARE TIME: None      Recent Results (from the past 24 hour(s))   Green Top (Gel)    Collection Time: 01/19/24  5:10 AM   Result Value Ref Range    Extra Tube Hold for add-ons.    Lavender Top    Collection Time: 01/19/24  5:10 AM   Result Value Ref Range    Extra Tube hold for add-on    Red Top    Collection Time: 01/19/24  5:10 AM   Result Value Ref Range    Extra Tube Hold for add-ons.    Light Blue Top    Collection Time: 01/19/24  5:10 AM   Result Value Ref Range    Extra Tube Hold for add-ons.    ECG 12 Lead Tachycardia    Collection Time: 01/19/24  5:17 AM   Result Value Ref Range    QT Interval 322 ms    QTC Interval 449 ms            The patient's last clinical visit to PCP was reviewed by me: ***      Old charts were reviewed per [employer] EMR.  Pertinent details are summarized  above.  All laboratory, radiologic, and EKG studies that were performed in the Emergency Department were a necessary part of the evaluation needed to exclude unstable or  emergent medical conditions.     Patient was hemodynamically and neurologically stable in the ED.   Pertinent studies were reviewed as above.     The patient received:  Medications   adenosine (ADENOCARD) injection 12 mg (12 mg Intravenous Given 1/19/24 0516)            ED Disposition       None              Dragon disclaimer:  Part of this note may be an electronic transcription/translation of spoken language to printed text using the Dragon Dictation System.    I have reviewed the patient’s prescription history via a prescription monitoring program.  This information is consistent with my knowledge of the patient’s controlled substance use history.    Patient evaluated during Coronavirus Pandemic. Isolation practices followed according to [facility] policy.     FINAL IMPRESSION  No diagnosis found.      Marco Baldwin Jr, MD

## 2024-01-22 LAB
QT INTERVAL: 242 MS
QT INTERVAL: 322 MS
QTC INTERVAL: 432 MS
QTC INTERVAL: 449 MS

## 2024-01-29 ENCOUNTER — TELEPHONE (OUTPATIENT)
Dept: INTERNAL MEDICINE | Facility: CLINIC | Age: 44
End: 2024-01-29

## 2024-01-29 NOTE — TELEPHONE ENCOUNTER
Caller: Marlon Mccarthy    Relationship: Self    Best call back number: [phone]     What form or medical record are you requesting: [facility] PAPERWORK    Who is requesting this form or medical record from you: EMPLOYER    How would you like to receive the form or medical records (pick-up, mail, fax): UPLOAD TO MY CHART    Timeframe paperwork needed: ASAP    Additional notes: PATIENT STATED THAT HE WAS ADMITTED ON 1.19.24 FOR SVT AT [facility]. PATIENT STATED THAT HE NEEDS [facility] PAPERWORK FILLED OUT FOR THIS TIME. PATIENT STATED THAT HE WILL FAX THIS TODAY TO OUR OFFICE. PLEASE CALL WHEN THIS HAS BEEN UPLOADED TO MY CHART.

## 2024-01-29 NOTE — TELEPHONE ENCOUNTER
[employer] message sent to patient asking to call the office to schedule an appointment.

## 2024-02-09 ENCOUNTER — OFFICE VISIT (OUTPATIENT)
Dept: INTERNAL MEDICINE | Facility: CLINIC | Age: 44
End: 2024-02-09
Payer: COMMERCIAL

## 2024-02-09 VITALS
HEIGHT: 77 IN | OXYGEN SATURATION: 96 % | HEART RATE: 102 BPM | WEIGHT: 315 LBS | TEMPERATURE: 97.3 F | SYSTOLIC BLOOD PRESSURE: 146 MMHG | BODY MASS INDEX: 37.19 KG/M2 | DIASTOLIC BLOOD PRESSURE: 98 MMHG

## 2024-02-09 DIAGNOSIS — N50.89 SCROTAL EDEMA: ICD-10-CM

## 2024-02-09 DIAGNOSIS — I47.10 SVT (SUPRAVENTRICULAR TACHYCARDIA): Primary | ICD-10-CM

## 2024-02-09 DIAGNOSIS — I10 BENIGN ESSENTIAL HTN: ICD-10-CM

## 2024-02-09 DIAGNOSIS — R73.03 PREDIABETES: ICD-10-CM

## 2024-02-09 PROCEDURE — 99214 OFFICE O/P EST MOD 30 MIN: CPT | Performed by: INTERNAL MEDICINE

## 2024-02-09 RX ORDER — FUROSEMIDE 20 MG/1
20 TABLET ORAL DAILY PRN
Qty: 30 TABLET | Refills: 5 | Status: SHIPPED | OUTPATIENT
Start: 2024-02-09

## 2024-02-09 RX ORDER — LOSARTAN POTASSIUM 50 MG/1
50 TABLET ORAL DAILY
Qty: 90 TABLET | Refills: 1 | Status: SHIPPED | OUTPATIENT
Start: 2024-02-09

## 2024-02-09 RX ORDER — CARVEDILOL 25 MG/1
25 TABLET ORAL 2 TIMES DAILY WITH MEALS
Qty: 180 TABLET | Refills: 1 | Status: SHIPPED | OUTPATIENT
Start: 2024-02-09

## 2024-02-09 NOTE — PATIENT INSTRUCTIONS
-Check your blood pressure daily for the next 2 weeks. Keep a log and let me know the results of your blood pressure readings at the end of 2 weeks.  -Call Francesco Garza's office to set up a follow up visit.

## 2024-02-09 NOTE — PROGRESS NOTES
Chief Complaint   Patient presents with    FMLA Paperwork     Primary Reason for Visit (Submitted on 2/9/2024)  What is the primary reason for your visit?: Other  Other (Submitted on 2/9/2024)  Please describe your symptoms.: Follow up from ER visit on 19th January. [facility].  Have you had these symptoms before?: Yes  How long have you been having these symptoms?: Greater than 2 weeks  Please list any medications you are currently taking for this condition.: None  Please describe any probable cause for these symptoms. : Caffine    History:  Marlon Mccarthy is a 44 y.o. male who presents today for evaluation of the above problems.      He presents today for FMLA paperwork.  He presented to the emergency room on January 19, 2024 in [facility].  He follows with Dr. Hector.  In the emergency room he was given adenosine and converted back to normal sinus rhythm.  He was asymptomatic since conversion.  He did have mild elevation in troponins.  It was felt he did not need any further workup at that time due to resolution of symptoms.    He does not currently have an appointment with Dr. Hector.  Looking back he was last seen by Dr. Hector on October 14, 2019.    He gets episodes anywhere between 4 and 6 times per year.  Normally, he can get himself out of [facility] but he cannot this time.  His work is requiring FMLA paperwork to be completed.  Within 24 hours of his episode he had a lot of sugar, and caffeine which he normally avoids.    As a separate issue he is having scrotal edema which has been evaluated by urology previously.  Ultrasound at that time showed normal sized testicles, small bilateral hydroceles and scrotal edema. He was given a short course of furosemide and was recommended to follow back up with us.  He notes that symptoms worsen with standing especially while at work.  He can get himself in a prone position which helps.  Symptoms are improved in the morning.  He tries to wear tight underwear to help with his  symptoms as well.      He has been having some depression symptoms and has been letting his health go.  He lost his wife and his son and since then he has not cares much about his health.  He has been eating more, and not been eating the right Foods.  He denies any suicidal ideation and does not feel like he needs any medication.    Blood pressure is elevated 146/98.  He admits that he has been out of both his carvedilol and losartan for the last 6 weeks.    HPI      ROS:  Review of Systems  As above      Current Outpatient Medications:     carvedilol (COREG) 25 MG tablet, Take 1 tablet by mouth 2 (Two) Times a Day With Meals., Disp: 180 tablet, Rfl: 1    cyclobenzaprine (FLEXERIL) 10 MG tablet, Take 1 tablet by mouth 3 (Three) Times a Day As Needed (back pain)., Disp: 30 tablet, Rfl: 3    furosemide (Lasix) 20 MG tablet, Take 1 tablet by mouth Daily As Needed (scrotal edema)., Disp: 30 tablet, Rfl: 5    losartan (COZAAR) 50 MG tablet, Take 1 tablet by mouth Daily., Disp: 90 tablet, Rfl: 1    Lab Results   Component Value Date    GLUCOSE 215 (H) 01/19/2024    BUN 11 01/19/2024    CREATININE 0.91 01/19/2024    EGFRRESULT 111.5 07/31/2023    EGFR 105.9 01/19/2024    BCR 12.1 01/19/2024    K 3.7 01/19/2024    CO2 28.0 01/19/2024    CALCIUM 9.2 01/19/2024    PROTENTOTREF 7.5 07/31/2023    ALBUMIN 3.6 01/19/2024    BILITOT 0.6 01/19/2024    AST 63 (H) 01/19/2024    ALT 51 (H) 01/19/2024       WBC   Date Value Ref Range Status   01/19/2024 10.98 (H) 3.40 - 10.80 10*3/mm3 Final   07/31/2023 10.25 3.40 - 10.80 10*3/mm3 Final     RBC   Date Value Ref Range Status   01/19/2024 5.50 4.14 - 5.80 10*6/mm3 Final   07/31/2023 5.69 4.14 - 5.80 10*6/mm3 Final     Hemoglobin   Date Value Ref Range Status   01/19/2024 14.8 13.0 - 17.7 g/dL Final     Hematocrit   Date Value Ref Range Status   01/19/2024 48.4 37.5 - 51.0 % Final     MCV   Date Value Ref Range Status   01/19/2024 88.0 79.0 - 97.0 fL Final     MCH   Date Value Ref Range  "Status   01/19/2024 26.9 26.6 - 33.0 pg Final     MCHC   Date Value Ref Range Status   01/19/2024 30.6 (L) 31.5 - 35.7 g/dL Final     RDW   Date Value Ref Range Status   01/19/2024 14.6 12.3 - 15.4 % Final     RDW-SD   Date Value Ref Range Status   01/19/2024 46.7 37.0 - 54.0 fl Final     MPV   Date Value Ref Range Status   01/19/2024 10.2 6.0 - 12.0 fL Final     Platelets   Date Value Ref Range Status   01/19/2024 271 140 - 450 10*3/mm3 Final     Neutrophil %   Date Value Ref Range Status   01/19/2024 60.4 42.7 - 76.0 % Final     Lymphocyte %   Date Value Ref Range Status   01/19/2024 29.0 19.6 - 45.3 % Final     Monocyte %   Date Value Ref Range Status   01/19/2024 7.2 5.0 - 12.0 % Final     Eosinophil %   Date Value Ref Range Status   01/19/2024 2.4 0.3 - 6.2 % Final     Basophil %   Date Value Ref Range Status   01/19/2024 0.5 0.0 - 1.5 % Final     Immature Grans %   Date Value Ref Range Status   01/19/2024 0.5 0.0 - 0.5 % Final     Neutrophils, Absolute   Date Value Ref Range Status   01/19/2024 6.65 1.70 - 7.00 10*3/mm3 Final     Lymphocytes, Absolute   Date Value Ref Range Status   01/19/2024 3.18 (H) 0.70 - 3.10 10*3/mm3 Final     Monocytes, Absolute   Date Value Ref Range Status   01/19/2024 0.79 0.10 - 0.90 10*3/mm3 Final     Eosinophils, Absolute   Date Value Ref Range Status   01/19/2024 0.26 0.00 - 0.40 10*3/mm3 Final     Basophils, Absolute   Date Value Ref Range Status   01/19/2024 0.05 0.00 - 0.20 10*3/mm3 Final     Immature Grans, Absolute   Date Value Ref Range Status   01/19/2024 0.05 0.00 - 0.05 10*3/mm3 Final     nRBC   Date Value Ref Range Status   01/19/2024 0.0 0.0 - 0.2 /100 WBC Final         OBJECTIVE:  Visit Vitals  /98 (BP Location: Left arm, Patient Position: Sitting, Cuff Size: Large Adult)   Pulse 102   Temp 97.3 °F (36.3 °C) (Temporal)   Ht 195.6 cm (77\")   Wt (!) 234 kg (515 lb)   SpO2 96%   BMI 61.07 kg/m²      Physical Exam  Constitutional:       General: He is not in acute " distress.     Appearance: He is well-developed. He is not ill-appearing or toxic-appearing.   HENT:      Head: Normocephalic and atraumatic.   Eyes:      Pupils: Pupils are equal, round, and reactive to light.   Neck:      Thyroid: No thyromegaly.      Trachea: Phonation normal.   Cardiovascular:      Rate and Rhythm: Normal rate and regular rhythm.      Heart sounds: No murmur heard.  Pulmonary:      Effort: No respiratory distress.   Genitourinary:     Testes:         Right: Swelling present.   Skin:     Coloration: Skin is not pale.      Findings: No erythema.   Neurological:      Mental Status: He is alert.   Psychiatric:         Behavior: Behavior normal.         Thought Content: Thought content normal.         Judgment: Judgment normal.         Assessment/Plan    Diagnoses and all orders for this visit:    1. SVT (supraventricular tachycardia) (Primary)  -     Ambulatory Referral to Cardiology  -     carvedilol (COREG) 25 MG tablet; Take 1 tablet by mouth 2 (Two) Times a Day With Meals.  Dispense: 180 tablet; Refill: 1    2. Benign essential HTN  -     losartan (COZAAR) 50 MG tablet; Take 1 tablet by mouth Daily.  Dispense: 90 tablet; Refill: 1  -     carvedilol (COREG) 25 MG tablet; Take 1 tablet by mouth 2 (Two) Times a Day With Meals.  Dispense: 180 tablet; Refill: 1    3. Prediabetes    4. Scrotal edema  -     furosemide (Lasix) 20 MG tablet; Take 1 tablet by mouth Daily As Needed (scrotal edema).  Dispense: 30 tablet; Refill: 5      Marlon has intermittent episodes of SVT that are usually responsive to conservative treatment.  This episode was not responsive and he required adenosine in the emergency room.  Going to refill his carvedilol, refer back to Dr. Hector, and complete his [facility] paperwork.  We discussed referral to Dr. Valdez but he is not interested in ablation at this time.    Blood pressure is uncontrolled.  Refill his carvedilol and losartan.    He has prediabetes with an A1c of 6.4% in 2022.   His glucose was over 200 in the emergency room but he reports eating a lot of sugar within 24 hours.  We will recheck all of his blood work when he comes back in a few months.  Recommend he adhere to a prediabetic diet which is low in sugars and carbohydrates.    Refill Lasix for scrotal edema    We briefly touched on his weight and weight loss.  We will discuss further when he comes back for his physical.    Return in about 3 months (around 5/9/2024) for Annual physical with me.      RAFAEL Sawyer MD  08:29 CST  2/9/2024   Electronically signed  Answers submitted by the patient for this visit:

## 2024-02-11 ENCOUNTER — HOSPITAL ENCOUNTER (EMERGENCY)
Facility: HOSPITAL | Age: 44
Discharge: HOME | End: 2024-02-11
Attending: EMERGENCY MEDICINE | Admitting: EMERGENCY MEDICINE
Payer: COMMERCIAL

## 2024-02-11 VITALS
HEART RATE: 98 BPM | RESPIRATION RATE: 15 BRPM | BODY MASS INDEX: 37.19 KG/M2 | DIASTOLIC BLOOD PRESSURE: 98 MMHG | OXYGEN SATURATION: 92 % | WEIGHT: 315 LBS | HEIGHT: 77 IN | SYSTOLIC BLOOD PRESSURE: 165 MMHG | TEMPERATURE: 98.3 F

## 2024-02-11 DIAGNOSIS — I47.10 SVT (SUPRAVENTRICULAR TACHYCARDIA): Primary | ICD-10-CM

## 2024-02-11 LAB
ALBUMIN SERPL-MCNC: 3.5 G/DL (ref 3.5–5.2)
ALBUMIN/GLOB SERPL: 0.8 G/DL
ALP SERPL-CCNC: 101 U/L (ref 39–117)
ALT SERPL W P-5'-P-CCNC: 45 U/L (ref 1–41)
ANION GAP SERPL CALCULATED.3IONS-SCNC: 12 MMOL/L (ref 5–15)
AST SERPL-CCNC: 50 U/L (ref 1–40)
BILIRUB SERPL-MCNC: 0.8 MG/DL (ref 0–1.2)
BILIRUB UR QL STRIP: NEGATIVE
BUN SERPL-MCNC: 10 MG/DL (ref 6–20)
BUN/CREAT SERPL: 13.7 (ref 7–25)
CALCIUM SPEC-SCNC: 9.1 MG/DL (ref 8.6–10.5)
CHLORIDE SERPL-SCNC: 102 MMOL/L (ref 98–107)
CLARITY UR: CLEAR
CO2 SERPL-SCNC: 24 MMOL/L (ref 22–29)
COLOR UR: YELLOW
CREAT SERPL-MCNC: 0.73 MG/DL (ref 0.76–1.27)
DEPRECATED RDW RBC AUTO: 45.9 FL (ref 37–54)
EGFRCR SERPLBLD CKD-EPI 2021: 114.3 ML/MIN/1.73
EOSINOPHIL # BLD MANUAL: 0.2 10*3/MM3 (ref 0–0.4)
EOSINOPHIL NFR BLD MANUAL: 2 % (ref 0.3–6.2)
ERYTHROCYTE [DISTWIDTH] IN BLOOD BY AUTOMATED COUNT: 14.8 % (ref 12.3–15.4)
GLOBULIN UR ELPH-MCNC: 4.6 GM/DL
GLUCOSE SERPL-MCNC: 168 MG/DL (ref 65–99)
GLUCOSE UR STRIP-MCNC: NEGATIVE MG/DL
HCT VFR BLD AUTO: 48.3 % (ref 37.5–51)
HGB BLD-MCNC: 15.3 G/DL (ref 13–17.7)
HGB UR QL STRIP.AUTO: NEGATIVE
HOLD SPECIMEN: NORMAL
HOLD SPECIMEN: NORMAL
KETONES UR QL STRIP: NEGATIVE
LEUKOCYTE ESTERASE UR QL STRIP.AUTO: NEGATIVE
LIPASE SERPL-CCNC: 16 U/L (ref 13–60)
LYMPHOCYTES # BLD MANUAL: 2.17 10*3/MM3 (ref 0.7–3.1)
LYMPHOCYTES NFR BLD MANUAL: 5 % (ref 5–12)
MCH RBC QN AUTO: 27.1 PG (ref 26.6–33)
MCHC RBC AUTO-ENTMCNC: 31.7 G/DL (ref 31.5–35.7)
MCV RBC AUTO: 85.6 FL (ref 79–97)
MONOCYTES # BLD: 0.49 10*3/MM3 (ref 0.1–0.9)
NEUTROPHILS # BLD AUTO: 7 10*3/MM3 (ref 1.7–7)
NEUTROPHILS NFR BLD MANUAL: 71 % (ref 42.7–76)
NEUTS VAC BLD QL SMEAR: ABNORMAL
NITRITE UR QL STRIP: NEGATIVE
PH UR STRIP.AUTO: 6 [PH] (ref 5–8)
PLAT MORPH BLD: NORMAL
PLATELET # BLD AUTO: 200 10*3/MM3 (ref 140–450)
PMV BLD AUTO: 10.3 FL (ref 6–12)
POTASSIUM SERPL-SCNC: 3.3 MMOL/L (ref 3.5–5.2)
PROT SERPL-MCNC: 8.1 G/DL (ref 6–8.5)
PROT UR QL STRIP: NEGATIVE
RBC # BLD AUTO: 5.64 10*6/MM3 (ref 4.14–5.8)
RBC MORPH BLD: NORMAL
SODIUM SERPL-SCNC: 138 MMOL/L (ref 136–145)
SP GR UR STRIP: 1.01 (ref 1–1.03)
UROBILINOGEN UR QL STRIP: NORMAL
VARIANT LYMPHS NFR BLD MANUAL: 17 % (ref 19.6–45.3)
VARIANT LYMPHS NFR BLD MANUAL: 5 % (ref 0–5)
WBC NRBC COR # BLD AUTO: 9.86 10*3/MM3 (ref 3.4–10.8)
WHOLE BLOOD HOLD COAG: NORMAL
WHOLE BLOOD HOLD SPECIMEN: NORMAL

## 2024-02-11 PROCEDURE — 99283 EMERGENCY DEPT VISIT LOW MDM: CPT

## 2024-02-11 PROCEDURE — 96374 THER/PROPH/DIAG INJ IV PUSH: CPT

## 2024-02-11 PROCEDURE — 36415 COLL VENOUS BLD VENIPUNCTURE: CPT

## 2024-02-11 PROCEDURE — 85025 COMPLETE CBC W/AUTO DIFF WBC: CPT | Performed by: EMERGENCY MEDICINE

## 2024-02-11 PROCEDURE — 93005 ELECTROCARDIOGRAM TRACING: CPT | Performed by: EMERGENCY MEDICINE

## 2024-02-11 PROCEDURE — 83690 ASSAY OF LIPASE: CPT | Performed by: EMERGENCY MEDICINE

## 2024-02-11 PROCEDURE — 81003 URINALYSIS AUTO W/O SCOPE: CPT | Performed by: EMERGENCY MEDICINE

## 2024-02-11 PROCEDURE — 25810000003 SODIUM CHLORIDE 0.9 % SOLUTION: Performed by: EMERGENCY MEDICINE

## 2024-02-11 PROCEDURE — 85007 BL SMEAR W/DIFF WBC COUNT: CPT | Performed by: EMERGENCY MEDICINE

## 2024-02-11 PROCEDURE — 80053 COMPREHEN METABOLIC PANEL: CPT | Performed by: EMERGENCY MEDICINE

## 2024-02-11 RX ORDER — DILTIAZEM HYDROCHLORIDE 5 MG/ML
0.35 INJECTION INTRAVENOUS ONCE
Status: COMPLETED | OUTPATIENT
Start: 2024-02-11 | End: 2024-02-11

## 2024-02-11 RX ADMIN — DILTIAZEM HYDROCHLORIDE 80.85 MG: 5 INJECTION INTRAVENOUS at 03:59

## 2024-02-11 RX ADMIN — SODIUM CHLORIDE 1000 ML: 9 INJECTION, SOLUTION INTRAVENOUS at 04:15

## 2024-02-12 LAB
QT INTERVAL: 246 MS
QT INTERVAL: 376 MS
QTC INTERVAL: 435 MS
QTC INTERVAL: 470 MS

## 2024-06-24 ENCOUNTER — TELEMEDICINE (OUTPATIENT)
Dept: FAMILY MEDICINE CLINIC | Facility: TELEHEALTH | Age: 44
End: 2024-06-24
Payer: COMMERCIAL

## 2024-06-24 DIAGNOSIS — R49.0 HOARSENESS: ICD-10-CM

## 2024-06-24 DIAGNOSIS — J40 BRONCHITIS: Primary | ICD-10-CM

## 2024-06-24 PROCEDURE — 99213 OFFICE O/P EST LOW 20 MIN: CPT | Performed by: NURSE PRACTITIONER

## 2024-06-24 RX ORDER — DEXTROMETHORPHAN HYDROBROMIDE AND PROMETHAZINE HYDROCHLORIDE 15; 6.25 MG/5ML; MG/5ML
5 SYRUP ORAL 4 TIMES DAILY PRN
Qty: 118 ML | Refills: 0 | Status: SHIPPED | OUTPATIENT
Start: 2024-06-24

## 2024-06-24 RX ORDER — ALBUTEROL SULFATE 90 UG/1
2 AEROSOL, METERED RESPIRATORY (INHALATION) EVERY 4 HOURS PRN
Qty: 18 G | Refills: 0 | Status: SHIPPED | OUTPATIENT
Start: 2024-06-24

## 2024-06-24 RX ORDER — GUAIFENESIN AND DEXTROMETHORPHAN HYDROBROMIDE 600; 30 MG/1; MG/1
1 TABLET, EXTENDED RELEASE ORAL EVERY 12 HOURS SCHEDULED
Qty: 20 TABLET | Refills: 0 | Status: SHIPPED | OUTPATIENT
Start: 2024-06-24

## 2024-06-24 RX ORDER — METHYLPREDNISOLONE 4 MG/1
TABLET ORAL
Qty: 21 TABLET | Refills: 0 | Status: SHIPPED | OUTPATIENT
Start: 2024-06-24

## 2024-06-24 NOTE — LETTER
June 24, 2024     Patient: Marlon Mccarthy   YOB: 1980   Date of Visit: 6/24/2024       To Whom it May Concern:    Marlon Mccarthy was seen in my clinic on 6/24/2024. He  may return to work in 2 days.            Sincerely,          KARLA Morales        CC: No Recipients

## 2024-06-24 NOTE — PROGRESS NOTES
You have chosen to receive care through a telehealth visit.  Do you consent to use a video/audio connection for your medical care today? Yes     HPI  Marlon Mccarthy is a 44 y.o. male  presents with complaint of 5 day h/o cough with runny nose which lead into hoarseness and worsening cough. His cough is productive with thick yellow phlegm. He tested for Covid 19 and was negative today. He reports no fever. He reports not feeling bad just a lot of coughing and shortness of breath during coughing episodes.     Review of Systems   Constitutional: Negative.    HENT:  Positive for congestion, postnasal drip, rhinorrhea, sinus pressure and sinus pain.    Respiratory:  Positive for cough and shortness of breath. Negative for wheezing and stridor.    Cardiovascular: Negative.    Gastrointestinal: Negative.    Musculoskeletal: Negative.    Neurological: Negative.    Hematological: Negative.    Psychiatric/Behavioral: Negative.         Past Medical History:   Diagnosis Date    Anxiety     Diverticulitis of colon     Diverticulosis     Hypertension     Non morbid obesity due to excess calories 05/16/2017    SVT (supraventricular tachycardia)        Family History   Problem Relation Age of Onset    Diabetes Mother     Hypertension Mother     Stroke Mother     Hypertension Father     No Known Problems Sister     No Known Problems Sister     No Known Problems Maternal Grandmother     No Known Problems Maternal Grandfather     No Known Problems Paternal Grandmother     No Known Problems Paternal Grandfather        Social History     Socioeconomic History    Marital status:    Tobacco Use    Smoking status: Never     Passive exposure: Never    Smokeless tobacco: Never   Vaping Use    Vaping status: Never Used   Substance and Sexual Activity    Alcohol use: Yes     Alcohol/week: 7.0 standard drinks of alcohol     Types: 7 Cans of beer per week     Comment: social    Drug use: No    Sexual activity: Not Currently      Partners: Female     Birth control/protection: Condom         There were no vitals taken for this visit.    PHYSICAL EXAM  Physical Exam   Constitutional: He is oriented to person, place, and time. He appears well-developed and well-nourished. He does not have a sickly appearance. He does not appear ill. No distress.   HENT:   Head: Normocephalic and atraumatic.   Right Ear: Hearing normal.   Left Ear: Hearing normal.   Nose: Rhinorrhea present.   Pulmonary/Chest: Effort normal.  No respiratory distress.  Neurological: He is alert and oriented to person, place, and time.   Psychiatric: He has a normal mood and affect.   Vitals reviewed.      Diagnoses and all orders for this visit:    1. Bronchitis (Primary)  -     methylPREDNISolone (MEDROL) 4 MG dose pack; Take as directed on package instructions.  Dispense: 21 tablet; Refill: 0  -     guaifenesin-dextromethorphan 600-30 mg (MUCINEX DM)  MG tablet sustained-release 12 hour; Take 1 tablet by mouth Every 12 (Twelve) Hours.  Dispense: 20 tablet; Refill: 0  -     promethazine-dextromethorphan (PROMETHAZINE-DM) 6.25-15 MG/5ML syrup; Take 5 mL by mouth 4 (Four) Times a Day As Needed for Cough.  Dispense: 118 mL; Refill: 0  -     albuterol sulfate  (90 Base) MCG/ACT inhaler; Inhale 2 puffs Every 4 (Four) Hours As Needed for Wheezing.  Dispense: 18 g; Refill: 0    2. Hoarseness    Rest voice   Warm salt water gargles.   Increase fluids and rest.       FOLLOW-UP  As discussed during visit with [facility], if symptoms worsen or fail to improve, follow-up with PCP/Urgent Care/Emergency Department.    Patient verbalizes understanding of medications, instructions for treatment and follow-up.    Ghada Velasquez, KARLA  06/24/2024  11:30 EDT    The use of a video visit has been reviewed with the patient and verbal informed consent has been obtained. Myself and Marlon Mccarthy participated in this visit. The patient is located in [place], [place], and I am located in  [place], [place]. [employer] and [employer]  were utilized.

## 2024-07-05 ENCOUNTER — TELEMEDICINE (OUTPATIENT)
Dept: FAMILY MEDICINE CLINIC | Facility: TELEHEALTH | Age: 44
End: 2024-07-05
Payer: COMMERCIAL

## 2024-07-05 DIAGNOSIS — J04.0 LARYNGITIS: ICD-10-CM

## 2024-07-05 DIAGNOSIS — J01.90 ACUTE NON-RECURRENT SINUSITIS, UNSPECIFIED LOCATION: Primary | ICD-10-CM

## 2024-07-05 DIAGNOSIS — J40 BRONCHITIS: ICD-10-CM

## 2024-07-05 RX ORDER — PREDNISONE 20 MG/1
20 TABLET ORAL 2 TIMES DAILY
Qty: 10 TABLET | Refills: 0 | Status: SHIPPED | OUTPATIENT
Start: 2024-07-05 | End: 2024-07-10

## 2024-07-05 RX ORDER — AMOXICILLIN AND CLAVULANATE POTASSIUM 875; 125 MG/1; MG/1
1 TABLET, FILM COATED ORAL 2 TIMES DAILY
Qty: 14 TABLET | Refills: 0 | Status: SHIPPED | OUTPATIENT
Start: 2024-07-05 | End: 2024-07-12

## 2024-07-05 RX ORDER — BENZONATATE 200 MG/1
200 CAPSULE ORAL 3 TIMES DAILY PRN
Qty: 21 CAPSULE | Refills: 0 | Status: SHIPPED | OUTPATIENT
Start: 2024-07-05

## 2024-07-05 NOTE — PROGRESS NOTES
Subjective   Chief Complaint   Patient presents with    URI       Marlon Mccarthy is a 44 y.o. male.     History of Present Illness  Patient was treated virtually about 2 weeks ago for bronchitis.  He was prescribed steroids, cough syrup and albuterol.  He has been taking the medicine as prescribed and completed the steroids but is not feeling much better.  He continues to have a frequent cough that has caused him to lose his voice  and wheezing.  He now also  has a lot of head congestion, sinus pain and pressure.  No new fever.  URI   This is a new problem. Episode onset: 2-3 weeks. The problem has been unchanged. There has been no fever. Associated symptoms include congestion, coughing, headaches, sinus pain, a sore throat and wheezing. Pertinent negatives include no abdominal pain, chest pain, diarrhea, dysuria, ear pain, nausea, plugged ear sensation, rash or vomiting. Treatments tried: medrol, promethazine dm, mucinex dm, albuterol. The treatment provided mild relief.        Allergies   Allergen Reactions    Lisinopril Cough       Past Medical History:   Diagnosis Date    Anxiety     Diverticulitis of colon     Diverticulosis     Hypertension     Non morbid obesity due to excess calories 05/16/2017    SVT (supraventricular tachycardia)        Past Surgical History:   Procedure Laterality Date    COLONOSCOPY      HERNIA REPAIR      INGUINAL HERNIA REPAIR         Social History     Socioeconomic History    Marital status:    Tobacco Use    Smoking status: Never     Passive exposure: Never    Smokeless tobacco: Never   Vaping Use    Vaping status: Never Used   Substance and Sexual Activity    Alcohol use: Yes     Alcohol/week: 7.0 standard drinks of alcohol     Types: 7 Cans of beer per week     Comment: social    Drug use: No    Sexual activity: Not Currently     Partners: Female     Birth control/protection: Condom       Family History   Problem Relation Age of Onset    Diabetes Mother     Hypertension  Mother     Stroke Mother     Hypertension Father     No Known Problems Sister     No Known Problems Sister     No Known Problems Maternal Grandmother     No Known Problems Maternal Grandfather     No Known Problems Paternal Grandmother     No Known Problems Paternal Grandfather          Current Outpatient Medications:     albuterol sulfate  (90 Base) MCG/ACT inhaler, Inhale 2 puffs Every 4 (Four) Hours As Needed for Wheezing., Disp: 18 g, Rfl: 0    amoxicillin-clavulanate (AUGMENTIN) 875-125 MG per tablet, Take 1 tablet by mouth 2 (Two) Times a Day for 7 days., Disp: 14 tablet, Rfl: 0    benzonatate (TESSALON) 200 MG capsule, Take 1 capsule by mouth 3 (Three) Times a Day As Needed for Cough., Disp: 21 capsule, Rfl: 0    carvedilol (COREG) 25 MG tablet, Take 1 tablet by mouth 2 (Two) Times a Day With Meals., Disp: 180 tablet, Rfl: 1    cyclobenzaprine (FLEXERIL) 10 MG tablet, Take 1 tablet by mouth 3 (Three) Times a Day As Needed (back pain)., Disp: 30 tablet, Rfl: 3    furosemide (Lasix) 20 MG tablet, Take 1 tablet by mouth Daily As Needed (scrotal edema)., Disp: 30 tablet, Rfl: 5    guaifenesin-dextromethorphan 600-30 mg (MUCINEX DM)  MG tablet sustained-release 12 hour, Take 1 tablet by mouth Every 12 (Twelve) Hours., Disp: 20 tablet, Rfl: 0    losartan (COZAAR) 50 MG tablet, Take 1 tablet by mouth Daily., Disp: 90 tablet, Rfl: 1    predniSONE (DELTASONE) 20 MG tablet, Take 1 tablet by mouth 2 (Two) Times a Day for 5 days., Disp: 10 tablet, Rfl: 0    promethazine-dextromethorphan (PROMETHAZINE-DM) 6.25-15 MG/5ML syrup, Take 5 mL by mouth 4 (Four) Times a Day As Needed for Cough., Disp: 118 mL, Rfl: 0      Review of Systems   Constitutional:  Positive for fatigue. Negative for chills, diaphoresis and fever.   HENT:  Positive for congestion, sinus pressure, sore throat and voice change. Negative for ear pain.    Respiratory:  Positive for cough and wheezing. Negative for chest tightness and shortness of  breath.    Cardiovascular:  Negative for chest pain.   Gastrointestinal:  Negative for abdominal pain, diarrhea, nausea and vomiting.   Genitourinary:  Negative for dysuria.   Musculoskeletal:  Negative for myalgias.   Skin:  Negative for rash.   Neurological:  Positive for headache.        There were no vitals filed for this visit.    Objective   Physical Exam  Constitutional:       General: He is not in acute distress.     Appearance: Normal appearance. He is not ill-appearing, toxic-appearing or diaphoretic.   HENT:      Head: Normocephalic.      Nose: Congestion present.      Right Sinus: Maxillary sinus tenderness and frontal sinus tenderness present.      Left Sinus: Maxillary sinus tenderness and frontal sinus tenderness present.      Comments: Per pt       Mouth/Throat:      Lips: Pink.      Mouth: Mucous membranes are moist.   Pulmonary:      Effort: Pulmonary effort is normal.      Comments: laryngitis  Neurological:      Mental Status: He is alert and oriented to person, place, and time.          Procedures     Assessment & Plan   Diagnoses and all orders for this visit:    1. Acute non-recurrent sinusitis, unspecified location (Primary)  -     amoxicillin-clavulanate (AUGMENTIN) 875-125 MG per tablet; Take 1 tablet by mouth 2 (Two) Times a Day for 7 days.  Dispense: 14 tablet; Refill: 0    2. Bronchitis  -     benzonatate (TESSALON) 200 MG capsule; Take 1 capsule by mouth 3 (Three) Times a Day As Needed for Cough.  Dispense: 21 capsule; Refill: 0  -     predniSONE (DELTASONE) 20 MG tablet; Take 1 tablet by mouth 2 (Two) Times a Day for 5 days.  Dispense: 10 tablet; Refill: 0    3. Laryngitis  -     predniSONE (DELTASONE) 20 MG tablet; Take 1 tablet by mouth 2 (Two) Times a Day for 5 days.  Dispense: 10 tablet; Refill: 0      Continue previously prescribed or over-the-counter cough medicine of choice.    Albuterol inhaler as needed.  If no improvement in symptoms with antibiotics, recommend urgent care  follow-up possible chest x-ray.    If symptoms worsen or do not improve follow up with your PCP or visit your nearest Urgent Care Center or ER.      PLAN: Discussed dosing, side effects, recommended other symptomatic care.  Patient should follow up with primary care provider, Urgent Care or ER if symptoms worsen, fail to resolve or other symptoms need attention. Patient/family agree to the above.         KARLA Murcia     The use of a video visit has been reviewed with the patient and verbal informed consent has been obtained. Myself and Marlon Mccarthy participated in this visit. The patient is located at [address] [place] [address]. I am located in [place], [place]. Mychart and Zoom were utilized.        This visit was performed via Telehealth.  This patient has been instructed to follow-up with their primary care provider if their symptoms worsen or the treatment provided does not resolve their illness.

## 2024-07-05 NOTE — PATIENT INSTRUCTIONS
Continue previously prescribed or over-the-counter cough medicine of choice.    Albuterol inhaler as needed.  If no improvement in symptoms with antibiotics, recommend urgent care follow-up possible chest x-ray.    If symptoms worsen or do not improve follow up with your PCP or visit your nearest Urgent Care Center or ER.

## 2024-07-31 ENCOUNTER — OFFICE VISIT (OUTPATIENT)
Dept: INTERNAL MEDICINE | Facility: CLINIC | Age: 44
End: 2024-07-31
Payer: COMMERCIAL

## 2024-07-31 VITALS
SYSTOLIC BLOOD PRESSURE: 150 MMHG | HEART RATE: 96 BPM | BODY MASS INDEX: 60.48 KG/M2 | HEIGHT: 77 IN | TEMPERATURE: 98.3 F | DIASTOLIC BLOOD PRESSURE: 90 MMHG | OXYGEN SATURATION: 97 %

## 2024-07-31 DIAGNOSIS — B37.2 CUTANEOUS CANDIDIASIS: ICD-10-CM

## 2024-07-31 DIAGNOSIS — I10 BENIGN ESSENTIAL HTN: Primary | ICD-10-CM

## 2024-07-31 DIAGNOSIS — I47.10 SVT (SUPRAVENTRICULAR TACHYCARDIA): ICD-10-CM

## 2024-07-31 DIAGNOSIS — E66.01 MORBID OBESITY DUE TO EXCESS CALORIES: ICD-10-CM

## 2024-07-31 DIAGNOSIS — R73.03 PREDIABETES: ICD-10-CM

## 2024-07-31 DIAGNOSIS — N50.89 SCROTAL EDEMA: ICD-10-CM

## 2024-07-31 DIAGNOSIS — F33.9 EPISODE OF RECURRENT MAJOR DEPRESSIVE DISORDER, UNSPECIFIED DEPRESSION EPISODE SEVERITY: ICD-10-CM

## 2024-07-31 PROCEDURE — 99214 OFFICE O/P EST MOD 30 MIN: CPT | Performed by: INTERNAL MEDICINE

## 2024-07-31 RX ORDER — LOSARTAN POTASSIUM 50 MG/1
50 TABLET ORAL DAILY
Qty: 90 TABLET | Refills: 1 | Status: SHIPPED | OUTPATIENT
Start: 2024-07-31

## 2024-07-31 RX ORDER — FUROSEMIDE 20 MG/1
20 TABLET ORAL DAILY PRN
Qty: 30 TABLET | Refills: 5 | Status: SHIPPED | OUTPATIENT
Start: 2024-07-31

## 2024-07-31 RX ORDER — CARVEDILOL 25 MG/1
25 TABLET ORAL 2 TIMES DAILY WITH MEALS
Qty: 180 TABLET | Refills: 1 | Status: SHIPPED | OUTPATIENT
Start: 2024-07-31

## 2024-07-31 RX ORDER — SERTRALINE HYDROCHLORIDE 25 MG/1
25 TABLET, FILM COATED ORAL DAILY
Qty: 30 TABLET | Refills: 2 | Status: SHIPPED | OUTPATIENT
Start: 2024-07-31

## 2024-07-31 RX ORDER — NYSTATIN 100000 U/G
1 CREAM TOPICAL 2 TIMES DAILY
Qty: 30 G | Refills: 2 | Status: SHIPPED | OUTPATIENT
Start: 2024-07-31

## 2024-07-31 NOTE — PROGRESS NOTES
Chief Complaint   Patient presents with    Groin Swelling     Last wednesday    Recurrent Skin Infections     Answers submitted by the patient for this visit:  Primary Reason for Visit (Submitted on 7/30/2024)  What is the primary reason for your visit?: Other  Other (Submitted on 7/30/2024)  Please describe your symptoms.: Swollen, inflammed scrotum.  Have you had these symptoms before?: Yes  How long have you been having these symptoms?: 5-7 days    History:  Marlon Mccarthy is a 44 y.o. male who presents today for evaluation of the above problems.      HPI  History of Present Illness  The patient presents for evaluation of scrotal swelling and pain.    He is experiencing swelling and pain in his scrotum, which he suspects may be accompanied by jock itch. The swelling was first noticed last Tuesday, but by Wednesday, it was significantly swollen, tight, and bulbous. This prompted him to seek immediate medical attention. He reports no pain or burning during urination. He is not currently taking Lasix. His scrotal discomfort had previously reduced to manageable levels with Lasix, but it has since returned. He expresses frustration with his current condition. He now uses a wheelchair due to tenderness on the underside of his scrotum, which intensifies when he walks. He has been applying Lotrimin to the area.  Without any improvement.  Although he cannot see his penis he is still able to urinate.  He had an appointment in December with urology but canceled this appointment.    He is experiencing depression and sleep disturbances. He spends most of his time at home alone, which is the only chance he socializes at work. He wakes up feeling anxious and wishes his life was better.  Symptoms started after passing of his wife and son.  Denies SI.    He is prediabetic with a previous A1c of 6.4%.    He has been out of his losartan and carvedilol.    He is concerned about his weight.  He states that he knows that he is not  taking care of himself and that he eats too much.        ROS:  Review of Systems  As above      Current Outpatient Medications:     carvedilol (COREG) 25 MG tablet, Take 1 tablet by mouth 2 (Two) Times a Day With Meals., Disp: 180 tablet, Rfl: 1    furosemide (Lasix) 20 MG tablet, Take 1 tablet by mouth Daily As Needed (scrotal edema)., Disp: 30 tablet, Rfl: 5    losartan (COZAAR) 50 MG tablet, Take 1 tablet by mouth Daily., Disp: 90 tablet, Rfl: 1    albuterol sulfate  (90 Base) MCG/ACT inhaler, Inhale 2 puffs Every 4 (Four) Hours As Needed for Wheezing., Disp: 18 g, Rfl: 0    benzonatate (TESSALON) 200 MG capsule, Take 1 capsule by mouth 3 (Three) Times a Day As Needed for Cough., Disp: 21 capsule, Rfl: 0    cyclobenzaprine (FLEXERIL) 10 MG tablet, Take 1 tablet by mouth 3 (Three) Times a Day As Needed (back pain)., Disp: 30 tablet, Rfl: 3    guaifenesin-dextromethorphan 600-30 mg (MUCINEX DM)  MG tablet sustained-release 12 hour, Take 1 tablet by mouth Every 12 (Twelve) Hours., Disp: 20 tablet, Rfl: 0    nystatin (MYCOSTATIN) 415247 UNIT/GM cream, Apply 1 Application topically to the appropriate area as directed 2 (Two) Times a Day., Disp: 30 g, Rfl: 2    promethazine-dextromethorphan (PROMETHAZINE-DM) 6.25-15 MG/5ML syrup, Take 5 mL by mouth 4 (Four) Times a Day As Needed for Cough., Disp: 118 mL, Rfl: 0    sertraline (Zoloft) 25 MG tablet, Take 1 tablet by mouth Daily., Disp: 30 tablet, Rfl: 2    Lab Results   Component Value Date    GLUCOSE 168 (H) 02/11/2024    BUN 10 02/11/2024    CREATININE 0.73 (L) 02/11/2024    EGFRRESULT 111.5 07/31/2023    EGFR 114.3 02/11/2024    BCR 13.7 02/11/2024    K 3.3 (L) 02/11/2024    CO2 24.0 02/11/2024    CALCIUM 9.1 02/11/2024    PROTENTOTREF 7.5 07/31/2023    ALBUMIN 3.5 02/11/2024    BILITOT 0.8 02/11/2024    AST 50 (H) 02/11/2024    ALT 45 (H) 02/11/2024       WBC   Date Value Ref Range Status   02/11/2024 9.86 3.40 - 10.80 10*3/mm3 Final   07/31/2023 10.25  3.40 - 10.80 10*3/mm3 Final     RBC   Date Value Ref Range Status   02/11/2024 5.64 4.14 - 5.80 10*6/mm3 Final   07/31/2023 5.69 4.14 - 5.80 10*6/mm3 Final     Hemoglobin   Date Value Ref Range Status   02/11/2024 15.3 13.0 - 17.7 g/dL Final     Hematocrit   Date Value Ref Range Status   02/11/2024 48.3 37.5 - 51.0 % Final     MCV   Date Value Ref Range Status   02/11/2024 85.6 79.0 - 97.0 fL Final     MCH   Date Value Ref Range Status   02/11/2024 27.1 26.6 - 33.0 pg Final     MCHC   Date Value Ref Range Status   02/11/2024 31.7 31.5 - 35.7 g/dL Final     RDW   Date Value Ref Range Status   02/11/2024 14.8 12.3 - 15.4 % Final     RDW-SD   Date Value Ref Range Status   02/11/2024 45.9 37.0 - 54.0 fl Final     MPV   Date Value Ref Range Status   02/11/2024 10.3 6.0 - 12.0 fL Final     Platelets   Date Value Ref Range Status   02/11/2024 200 140 - 450 10*3/mm3 Final     Neutrophil %   Date Value Ref Range Status   01/19/2024 60.4 42.7 - 76.0 % Final     Lymphocyte %   Date Value Ref Range Status   01/19/2024 29.0 19.6 - 45.3 % Final     Monocyte %   Date Value Ref Range Status   01/19/2024 7.2 5.0 - 12.0 % Final     Eosinophil %   Date Value Ref Range Status   01/19/2024 2.4 0.3 - 6.2 % Final     Basophil %   Date Value Ref Range Status   01/19/2024 0.5 0.0 - 1.5 % Final     Immature Grans %   Date Value Ref Range Status   01/19/2024 0.5 0.0 - 0.5 % Final     Neutrophils Absolute   Date Value Ref Range Status   02/11/2024 7.00 1.70 - 7.00 10*3/mm3 Final     Neutrophils, Absolute   Date Value Ref Range Status   01/19/2024 6.65 1.70 - 7.00 10*3/mm3 Final     Lymphocytes, Absolute   Date Value Ref Range Status   01/19/2024 3.18 (H) 0.70 - 3.10 10*3/mm3 Final     Monocytes, Absolute   Date Value Ref Range Status   01/19/2024 0.79 0.10 - 0.90 10*3/mm3 Final     Eosinophils Absolute   Date Value Ref Range Status   02/11/2024 0.20 0.00 - 0.40 10*3/mm3 Final     Eosinophils, Absolute   Date Value Ref Range Status   01/19/2024  "0.26 0.00 - 0.40 10*3/mm3 Final     Basophils, Absolute   Date Value Ref Range Status   01/19/2024 0.05 0.00 - 0.20 10*3/mm3 Final     Immature Grans, Absolute   Date Value Ref Range Status   01/19/2024 0.05 0.00 - 0.05 10*3/mm3 Final     nRBC   Date Value Ref Range Status   01/19/2024 0.0 0.0 - 0.2 /100 WBC Final         OBJECTIVE:  Visit Vitals  /90 (BP Location: Left arm, Patient Position: Sitting, Cuff Size: Large Adult)   Pulse 96   Temp 98.3 °F (36.8 °C) (Temporal)   Ht 195.6 cm (77\")   SpO2 97%   BMI 60.48 kg/m²      Physical Exam  Vitals and nursing note reviewed.   Constitutional:       General: He is not in acute distress.     Appearance: He is well-developed. He is obese. He is not ill-appearing or toxic-appearing.      Comments: Sitting in a wheelchair   HENT:      Head: Normocephalic and atraumatic.   Eyes:      Pupils: Pupils are equal, round, and reactive to light.   Neck:      Thyroid: No thyromegaly.      Trachea: Phonation normal.   Cardiovascular:      Rate and Rhythm: Normal rate and regular rhythm.      Heart sounds: No murmur heard.  Pulmonary:      Effort: No respiratory distress.   Genitourinary:     Testes:         Right: Swelling (Significant edema with pitting.  Scrotum is estimated to be at the size of a football) present.         Left: Swelling present.      Comments: There is evidence of candidiasis as well as dry skin with scaling  Skin:     Coloration: Skin is not pale.      Findings: No erythema.   Neurological:      Mental Status: He is alert.   Psychiatric:         Attention and Perception: Attention normal.         Mood and Affect: Mood is depressed. Affect is tearful.         Behavior: Behavior normal. Behavior is cooperative.         Thought Content: Thought content normal. Thought content does not include suicidal ideation.         Judgment: Judgment normal.       Physical Exam  Vital Signs  Vitals show a blood pressure of 150/90.    Results  Imaging  Ultrasound scrotum " July 11, 2023 showed normal size testicles, but hydroceles in both testicles and edema were found.    Assessment/Plan      Diagnoses and all orders for this visit:    1. Benign essential HTN (Primary)  -     carvedilol (COREG) 25 MG tablet; Take 1 tablet by mouth 2 (Two) Times a Day With Meals.  Dispense: 180 tablet; Refill: 1  -     losartan (COZAAR) 50 MG tablet; Take 1 tablet by mouth Daily.  Dispense: 90 tablet; Refill: 1  -     Basic metabolic panel; Future    2. Morbid obesity due to excess calories  -     Hemoglobin A1c; Future  -     Lipid Panel; Future  -     Ambulatory Referral to Bariatric Surgery    3. Scrotal edema  -     furosemide (Lasix) 20 MG tablet; Take 1 tablet by mouth Daily As Needed (scrotal edema).  Dispense: 30 tablet; Refill: 5  -     Basic metabolic panel; Future    4. SVT (supraventricular tachycardia)  -     carvedilol (COREG) 25 MG tablet; Take 1 tablet by mouth 2 (Two) Times a Day With Meals.  Dispense: 180 tablet; Refill: 1    5. Cutaneous candidiasis  -     nystatin (MYCOSTATIN) 199650 UNIT/GM cream; Apply 1 Application topically to the appropriate area as directed 2 (Two) Times a Day.  Dispense: 30 g; Refill: 2    6. Episode of recurrent major depressive disorder, unspecified depression episode severity  -     sertraline (Zoloft) 25 MG tablet; Take 1 tablet by mouth Daily.  Dispense: 30 tablet; Refill: 2    7. Prediabetes  -     Hemoglobin A1c; Future      Assessment & Plan    I am very concerned about Gabriellas health.    Scrotal swelling and pain.  He has significant pitting edema in the scrotum similar to previous.  Symptoms improved with Lasix previously, so this was refilled today.  He also has evidence of candidiasis. Nystatin was prescribed for the yeast infection in the scrotum.  BMP today since we are restarting Lasix.    Depression.  Zoloft was prescribed, to be taken once daily. He was advised to take it at night due to its potential to cause drowsiness. If insomnia occur,  it can be taken in the morning. Improvement may take 4 to 6 weeks.    Hypertension.  Uncontrolled.  Carvedilol and losartan were refilled.    Morbid obesity  We were unable to get a weight on our scale today, but his previous weight was 510 pounds.  I am referring to Dr. Neil for weight loss considerations.    Prediabetes.  Previous A1c 6.4% couple years ago.  Recheck A1c today    Follow-up  A follow-up appointment is scheduled in 2 weeks.      Return in about 2 weeks (around 8/14/2024) for Recheck wtih me.      RAFAEL Sawyer MD  10:20 CDT  7/31/2024   Electronically signed      Patient or patient representative verbalized consent for the use of Ambient Listening during the visit with  BROOKE Sawyer MD for chart documentation. 7/31/2024  10:52 CDT

## 2024-08-01 ENCOUNTER — TELEPHONE (OUTPATIENT)
Dept: SURGERY | Facility: CLINIC | Age: 44
End: 2024-08-01
Payer: COMMERCIAL

## 2024-08-01 NOTE — TELEPHONE ENCOUNTER
Attempted to contact the patient to see if they would be interested in starting our new patient class. Left them a voicemail with our office number and requested they give us a call back at their earliest convenience to get scheduled.    [facility]  08/01

## 2024-08-02 RX ORDER — FLUCONAZOLE 150 MG/1
150 TABLET ORAL
Qty: 2 TABLET | Refills: 0 | Status: SHIPPED | OUTPATIENT
Start: 2024-08-02

## 2024-10-11 ENCOUNTER — HOSPITAL ENCOUNTER (EMERGENCY)
Facility: HOSPITAL | Age: 44
Discharge: HOME | End: 2024-10-12
Attending: FAMILY MEDICINE
Payer: COMMERCIAL

## 2024-10-11 DIAGNOSIS — I47.10 SUPRAVENTRICULAR TACHYCARDIA: Primary | ICD-10-CM

## 2024-10-11 LAB
ANION GAP SERPL CALCULATED.3IONS-SCNC: 11 MMOL/L (ref 5–15)
BASOPHILS # BLD AUTO: 0.06 10*3/MM3 (ref 0–0.2)
BASOPHILS NFR BLD AUTO: 0.5 % (ref 0–1.5)
BUN SERPL-MCNC: 10 MG/DL (ref 6–20)
BUN/CREAT SERPL: 13.7 (ref 7–25)
CALCIUM SPEC-SCNC: 9 MG/DL (ref 8.6–10.5)
CHLORIDE SERPL-SCNC: 102 MMOL/L (ref 98–107)
CO2 SERPL-SCNC: 26 MMOL/L (ref 22–29)
CREAT SERPL-MCNC: 0.73 MG/DL (ref 0.76–1.27)
DEPRECATED RDW RBC AUTO: 46.8 FL (ref 37–54)
EGFRCR SERPLBLD CKD-EPI 2021: 114.3 ML/MIN/1.73
EOSINOPHIL # BLD AUTO: 0.19 10*3/MM3 (ref 0–0.4)
EOSINOPHIL NFR BLD AUTO: 1.6 % (ref 0.3–6.2)
ERYTHROCYTE [DISTWIDTH] IN BLOOD BY AUTOMATED COUNT: 14.7 % (ref 12.3–15.4)
GLUCOSE SERPL-MCNC: 144 MG/DL (ref 65–99)
HCT VFR BLD AUTO: 47.8 % (ref 37.5–51)
HGB BLD-MCNC: 15 G/DL (ref 13–17.7)
HOLD SPECIMEN: NORMAL
IMM GRANULOCYTES # BLD AUTO: 0.04 10*3/MM3 (ref 0–0.05)
IMM GRANULOCYTES NFR BLD AUTO: 0.3 % (ref 0–0.5)
LYMPHOCYTES # BLD AUTO: 2.51 10*3/MM3 (ref 0.7–3.1)
LYMPHOCYTES NFR BLD AUTO: 21.1 % (ref 19.6–45.3)
MAGNESIUM SERPL-MCNC: 1.8 MG/DL (ref 1.6–2.6)
MCH RBC QN AUTO: 27.4 PG (ref 26.6–33)
MCHC RBC AUTO-ENTMCNC: 31.4 G/DL (ref 31.5–35.7)
MCV RBC AUTO: 87.4 FL (ref 79–97)
MONOCYTES # BLD AUTO: 0.96 10*3/MM3 (ref 0.1–0.9)
MONOCYTES NFR BLD AUTO: 8.1 % (ref 5–12)
NEUTROPHILS NFR BLD AUTO: 68.4 % (ref 42.7–76)
NEUTROPHILS NFR BLD AUTO: 8.11 10*3/MM3 (ref 1.7–7)
NRBC BLD AUTO-RTO: 0 /100 WBC (ref 0–0.2)
PLATELET # BLD AUTO: 236 10*3/MM3 (ref 140–450)
PMV BLD AUTO: 10.2 FL (ref 6–12)
POTASSIUM SERPL-SCNC: 3.7 MMOL/L (ref 3.5–5.2)
QT INTERVAL: 238 MS
QT INTERVAL: 354 MS
QTC INTERVAL: 423 MS
QTC INTERVAL: 479 MS
RBC # BLD AUTO: 5.47 10*6/MM3 (ref 4.14–5.8)
SODIUM SERPL-SCNC: 139 MMOL/L (ref 136–145)
TROPONIN T SERPL HS-MCNC: 25 NG/L
TROPONIN T SERPL HS-MCNC: 26 NG/L
WBC NRBC COR # BLD AUTO: 11.87 10*3/MM3 (ref 3.4–10.8)
WHOLE BLOOD HOLD COAG: NORMAL
WHOLE BLOOD HOLD SPECIMEN: NORMAL

## 2024-10-11 PROCEDURE — 83735 ASSAY OF MAGNESIUM: CPT | Performed by: FAMILY MEDICINE

## 2024-10-11 PROCEDURE — 85025 COMPLETE CBC W/AUTO DIFF WBC: CPT | Performed by: FAMILY MEDICINE

## 2024-10-11 PROCEDURE — 93005 ELECTROCARDIOGRAM TRACING: CPT | Performed by: PHYSICIAN ASSISTANT

## 2024-10-11 PROCEDURE — 93010 ELECTROCARDIOGRAM REPORT: CPT | Performed by: INTERNAL MEDICINE

## 2024-10-11 PROCEDURE — 84484 ASSAY OF TROPONIN QUANT: CPT | Performed by: PHYSICIAN ASSISTANT

## 2024-10-11 PROCEDURE — 93005 ELECTROCARDIOGRAM TRACING: CPT

## 2024-10-11 PROCEDURE — 25010000002 ADENOSINE PER 6 MG

## 2024-10-11 PROCEDURE — 96374 THER/PROPH/DIAG INJ IV PUSH: CPT

## 2024-10-11 PROCEDURE — 80048 BASIC METABOLIC PNL TOTAL CA: CPT | Performed by: FAMILY MEDICINE

## 2024-10-11 PROCEDURE — 99284 EMERGENCY DEPT VISIT MOD MDM: CPT

## 2024-10-11 PROCEDURE — 36415 COLL VENOUS BLD VENIPUNCTURE: CPT

## 2024-10-11 RX ORDER — ADENOSINE 3 MG/ML
12 INJECTION, SOLUTION INTRAVENOUS ONCE
Status: COMPLETED | OUTPATIENT
Start: 2024-10-11 | End: 2024-10-11

## 2024-10-11 RX ORDER — SODIUM CHLORIDE 0.9 % (FLUSH) 0.9 %
10 SYRINGE (ML) INJECTION AS NEEDED
Status: DISCONTINUED | OUTPATIENT
Start: 2024-10-11 | End: 2024-10-12 | Stop reason: HOSPADM

## 2024-10-11 RX ORDER — ADENOSINE 3 MG/ML
INJECTION, SOLUTION INTRAVENOUS
Status: COMPLETED
Start: 2024-10-11 | End: 2024-10-11

## 2024-10-11 RX ADMIN — ADENOSINE 12 MG: 3 INJECTION, SOLUTION INTRAVENOUS at 21:32

## 2024-10-11 RX ADMIN — ADENOSINE 12 MG: 3 INJECTION INTRAVENOUS at 21:32

## 2024-10-11 NOTE — Clinical Note
[facility] EMERGENCY DEPARTMENT  [address]  [facility] [address]  Phone: [phone]    Marlon Mccarthy was seen and treated in our emergency department on 10/11/2024.  He may return to work on 10/13/2024.         Thank you for choosing [facility].    Rolo Cifuentes MD

## 2024-10-12 VITALS
BODY MASS INDEX: 37.19 KG/M2 | OXYGEN SATURATION: 98 % | SYSTOLIC BLOOD PRESSURE: 190 MMHG | HEART RATE: 98 BPM | WEIGHT: 315 LBS | RESPIRATION RATE: 20 BRPM | HEIGHT: 77 IN | DIASTOLIC BLOOD PRESSURE: 112 MMHG | TEMPERATURE: 98.4 F

## 2024-10-12 NOTE — ED PROVIDER NOTES
Subjective   History of Present Illness    Review of Systems    Past Medical History:   Diagnosis Date    Anxiety     Diverticulitis of colon     Diverticulosis     Hypertension     Non morbid obesity due to excess calories 05/16/2017    SVT (supraventricular tachycardia)        Allergies   Allergen Reactions    Lisinopril Cough       Past Surgical History:   Procedure Laterality Date    COLONOSCOPY      HERNIA REPAIR      INGUINAL HERNIA REPAIR         Family History   Problem Relation Age of Onset    Diabetes Mother     Hypertension Mother     Stroke Mother     Hypertension Father     No Known Problems Sister     No Known Problems Sister     No Known Problems Maternal Grandmother     No Known Problems Maternal Grandfather     No Known Problems Paternal Grandmother     No Known Problems Paternal Grandfather        Social History     Socioeconomic History    Marital status:    Tobacco Use    Smoking status: Never     Passive exposure: Never    Smokeless tobacco: Never   Vaping Use    Vaping status: Never Used   Substance and Sexual Activity    Alcohol use: Yes     Alcohol/week: 7.0 standard drinks of alcohol     Types: 7 Cans of beer per week     Comment: social    Drug use: No    Sexual activity: Not Currently     Partners: Female     Birth control/protection: Condom           Objective   Physical Exam    Procedures           ED Course  ED Course as of 10/11/24 2354   Fri Oct 11, 2024   2134 EKG  Rate 190  SVT [RP]   2134 Assumed care of patient at this time from Dr. Rolo Cifuentes. Pending labs will reevaluate and disposition accordingly. Please see Dr. Cifuentes's note above for HPI, ROS, physical examination findings.  [JS]   2139 EKG rate 110 sinus tachycardia  No STEMI [RP]   2208 Lab work revealed leukocytosis 11.87, hyperglycemia 144 and otherwise stable CBC with normal H&H, normal platelets.  BMP with no further electro disturbances including normal magnesium, normal anion gap.  Normal renal  functions.    Will continue to monitor. [JS]   2228 Upon reevaluation at this time patient resting in the bed stating that he is feeling significantly improved and back to his baseline.  Patient states that today's onset of symptoms was typical of his previous SVT episodes where he describes he was yelling at a raccoon angry when he got home from work which onset it.  Patient adamantly denies any chest pain/pressure/heaviness/tightness, shortness of breath or any other changes from his baseline.  Advised patient that he will need to follow-up with his primary care provider within the next 48 hours for close reevaluation, strict return precautions, and need for immediate return to ED should he develop any new or worsening symptoms.  Discussed with patient need for continued monitoring for up to the 2-hour camron for which he politely declines at this time as he states he is ready to go home.  Patient is very appreciative, hemodynamically stable with no other questions, concerns, needs at this time and is stable for discharge. [JS]   2350 Patient's initial troponin 26 with repeat delta -1 at 25.  Patient continues to remain asymptomatic stating he feels much better and is stable for discharge at this time. [JS]      ED Course User Index  [JS] Rg Wiseman PA-C  [RP] Rolo Cifuentes MD                                             Medical Decision Making  Problems Addressed:  Supraventricular tachycardia: complicated acute illness or injury    Amount and/or Complexity of Data Reviewed  External Data Reviewed: labs, radiology, ECG and notes.  Labs: ordered. Decision-making details documented in ED Course.  Radiology: ordered. Decision-making details documented in ED Course.  ECG/medicine tests: ordered. Decision-making details documented in ED Course.  Discussion of management or test interpretation with external provider(s): Dr. Rolo Cifuentes (attending)    Risk  Prescription drug management.        Final diagnoses:    Supraventricular tachycardia       ED Disposition  ED Disposition       ED Disposition   Discharge    Condition   Stable    Comment   --               BROOKE Sawyer MD  [address]  [address]  [facility] [id number] [phone]    Schedule an appointment as soon as possible for a visit in 2 days      [facility] EMERGENCY DEPARTMENT  [address] [facility] [id number] [phone]    As needed    Teddy Hector MD  [address]  [facility] [address]  [phone]    Schedule an appointment as soon as possible for a visit in 2 days           Medication List      No changes were made to your prescriptions during this visit.            Rg Wiseman PA-C  10/11/24 2730       Rg Wiseman PA-C  10/11/24 [id number]

## 2024-10-12 NOTE — DISCHARGE INSTRUCTIONS
Please continue to follow-up with your primary care provider and cardiologist within the next 48 hours for close outpatient reevaluation.  Should you develop any new or worsening symptoms please return to the ER for further ration.

## 2024-10-12 NOTE — ED PROVIDER NOTES
Subjective   History of Present Illness  44-year-old male brought in by EMS.  States that his heart was going fast.  He knows that he is in SVT.  He has a history of SVT.  He states that normally he gets some IV medication his heart rate improves.  Patient denies any other symptoms at this time.      Review of Systems   Cardiovascular:  Positive for palpitations.   All other systems reviewed and are negative.      Past Medical History:   Diagnosis Date   • Anxiety    • Diverticulitis of colon    • Diverticulosis    • Hypertension    • Non morbid obesity due to excess calories 05/16/2017   • SVT (supraventricular tachycardia)        Allergies   Allergen Reactions   • Lisinopril Cough       Past Surgical History:   Procedure Laterality Date   • COLONOSCOPY     • HERNIA REPAIR     • INGUINAL HERNIA REPAIR         Family History   Problem Relation Age of Onset   • Diabetes Mother    • Hypertension Mother    • Stroke Mother    • Hypertension Father    • No Known Problems Sister    • No Known Problems Sister    • No Known Problems Maternal Grandmother    • No Known Problems Maternal Grandfather    • No Known Problems Paternal Grandmother    • No Known Problems Paternal Grandfather        Social History     Socioeconomic History   • Marital status:    Tobacco Use   • Smoking status: Never     Passive exposure: Never   • Smokeless tobacco: Never   Vaping Use   • Vaping status: Never Used   Substance and Sexual Activity   • Alcohol use: Yes     Alcohol/week: 7.0 standard drinks of alcohol     Types: 7 Cans of beer per week     Comment: social   • Drug use: No   • Sexual activity: Not Currently     Partners: Female     Birth control/protection: Condom           Objective   Physical Exam  Vitals and nursing note reviewed.   Constitutional:       Appearance: Normal appearance.   HENT:      Head: Normocephalic and atraumatic.   Eyes:      Extraocular Movements: Extraocular movements intact.      Pupils: Pupils are equal,  round, and reactive to light.   Cardiovascular:      Rate and Rhythm: Tachycardia present. Rhythm irregular.   Pulmonary:      Effort: Pulmonary effort is normal.      Breath sounds: Normal breath sounds.   Abdominal:      General: Bowel sounds are normal.      Palpations: Abdomen is soft.      Tenderness: There is no abdominal tenderness.   Skin:     General: Skin is warm and dry.   Neurological:      General: No focal deficit present.      Mental Status: He is alert and oriented to person, place, and time.   Psychiatric:         Mood and Affect: Mood normal.         Behavior: Behavior normal.         Procedures           ED Course  ED Course as of 10/11/24 2140   Fri Oct 11, 2024   2134 EKG  Rate 190  SVT [RP]   2134 Assumed care of patient at this time from Dr. Rolo Cifuentes. Pending labs will reevaluate and disposition accordingly. Please see Dr. Cifuentes's note above for HPI, ROS, physical examination findings.  [JS]   2139 EKG rate 110 sinus tachycardia  No STEMI [RP]      ED Course User Index  [JS] Rg Wiseman PA-C  [RP] Rolo Cifuentes MD                                             Medical Decision Making  Amount and/or Complexity of Data Reviewed  Labs: ordered.    Risk  Prescription drug management.        Final diagnoses:   Supraventricular tachycardia       ED Disposition  ED Disposition       None            No follow-up provider specified.       Medication List      No changes were made to your prescriptions during this visit.          need for continued monitoring for up to the 2-hour camron for which he politely declines at this time as he states he is ready to go home.  Patient is very appreciative, hemodynamically stable with no other questions, concerns, needs at this time and is stable for discharge. [JS]   2350 Patient's initial troponin 26 with repeat delta -1 at 25.  Patient continues to remain asymptomatic stating he feels much better and is stable for discharge at this time. [JS]      ED Course User Index  [JS] Rg Wiseman PA-C  [RP] Rolo Cifuentes MD                                             Medical Decision Making  Amount and/or Complexity of Data Reviewed  Labs: ordered.    Risk  Prescription drug management.        Final diagnoses:   Supraventricular tachycardia       ED Disposition  ED Disposition       ED Disposition   Discharge    Condition   Stable    Comment   --               BROOKE Sawyer MD  [address]  [place] [id number]  [phone]    Schedule an appointment as soon as possible for a visit in 2 days      [facility]  [address] [id number] [phone]    As needed    Teddy Hector MD  [address] [facility] [address]  [place] [id number]  [phone]    Schedule an appointment as soon as possible for a visit in 2 days           Medication List      No changes were made to your prescriptions during this visit.            Rolo Cifuentes MD  10/21/24 1848

## 2024-10-16 ENCOUNTER — TELEMEDICINE (OUTPATIENT)
Dept: FAMILY MEDICINE CLINIC | Facility: TELEHEALTH | Age: 44
End: 2024-10-16
Payer: COMMERCIAL

## 2024-10-16 DIAGNOSIS — J01.40 ACUTE PANSINUSITIS, RECURRENCE NOT SPECIFIED: Primary | ICD-10-CM

## 2024-10-16 LAB
QT INTERVAL: 238 MS
QT INTERVAL: 354 MS
QTC INTERVAL: 423 MS
QTC INTERVAL: 479 MS

## 2024-10-16 PROCEDURE — 99213 OFFICE O/P EST LOW 20 MIN: CPT | Performed by: NURSE PRACTITIONER

## 2024-10-16 RX ORDER — DEXTROMETHORPHAN HYDROBROMIDE AND PROMETHAZINE HYDROCHLORIDE 15; 6.25 MG/5ML; MG/5ML
5 SYRUP ORAL 4 TIMES DAILY PRN
Qty: 120 ML | Refills: 0 | Status: SHIPPED | OUTPATIENT
Start: 2024-10-16

## 2024-10-16 RX ORDER — AZITHROMYCIN 250 MG/1
TABLET, FILM COATED ORAL
Qty: 6 TABLET | Refills: 0 | Status: SHIPPED | OUTPATIENT
Start: 2024-10-16

## 2024-10-16 RX ORDER — BENZONATATE 200 MG/1
200 CAPSULE ORAL 3 TIMES DAILY PRN
Qty: 20 CAPSULE | Refills: 0 | Status: SHIPPED | OUTPATIENT
Start: 2024-10-16

## 2024-10-16 NOTE — PATIENT INSTRUCTIONS
Drink plenty of water  Over the counter pain relievers okay   If symptoms do not improve in 3-5 days follow up with your primary care provider or urgent care  If symptoms worsen follow up with urgent care or the emergency room      Sinus Infection, Adult  A sinus infection is soreness and swelling (inflammation) of your sinuses. Sinuses are hollow spaces in the bones around your face. They are located:  Around your eyes.  In the middle of your forehead.  Behind your nose.  In your cheekbones.  Your sinuses and nasal passages are lined with a fluid called mucus. Mucus drains out of your sinuses. Swelling can trap mucus in your sinuses. This lets germs (bacteria, virus, or fungus) grow, which leads to infection. Most of the time, this condition is caused by a virus.  What are the causes?  Allergies.  Asthma.  Germs.  Things that block your nose or sinuses.  Growths in the nose (nasal polyps).  Chemicals or irritants in the air.  A fungus. This is rare.  What increases the risk?  Having a weak body defense system (immune system).  Doing a lot of swimming or diving.  Using nasal sprays too much.  Smoking.  What are the signs or symptoms?  The main symptoms of this condition are pain and a feeling of pressure around the sinuses. Other symptoms include:  Stuffy nose (congestion). This may make it hard to breathe through your nose.  Runny nose (drainage).  Soreness, swelling, and warmth in the sinuses.  A cough that may get worse at night.  Being unable to smell and taste.  Mucus that collects in the throat or the back of the nose (postnasal drip). This may cause a sore throat or bad breath.  Being very tired (fatigued).  A fever.  How is this diagnosed?  Your symptoms.  Your medical history.  A physical exam.  Tests to find out if your condition is short-term (acute) or long-term (chronic). Your doctor may:  Check your nose for growths (polyps).  Check your sinuses using a tool that has a light on one end  (endoscope).  Check for allergies or germs.  Do imaging tests, such as an MRI or CT scan.  How is this treated?  Treatment for this condition depends on the cause and whether it is short-term or long-term.  If caused by a virus, your symptoms should go away on their own within 10 days. You may be given medicines to relieve symptoms. They include:  Medicines that shrink swollen tissue in the nose.  A spray that treats swelling of the nostrils.  Rinses that help get rid of thick mucus in your nose (nasal saline washes).  Medicines that treat allergies (antihistamines).  Over-the-counter pain relievers.  If caused by bacteria, your doctor may wait to see if you will get better without treatment. You may be given antibiotic medicine if you have:  A very bad infection.  A weak body defense system.  If caused by growths in the nose, surgery may be needed.  Follow these instructions at home:  Medicines  Take, use, or apply over-the-counter and prescription medicines only as told by your doctor. These may include nasal sprays.  If you were prescribed an antibiotic medicine, take it as told by your doctor. Do not stop taking it even if you start to feel better.  Hydrate and humidify    Drink enough water to keep your pee (urine) pale yellow.  Use a cool mist humidifier to keep the humidity level in your home above 50%.  Breathe in steam for 10-15 minutes, 3-4 times a day, or as told by your doctor. You can do this in the bathroom while a hot shower is running.  Try not to spend time in cool or dry air.  Rest  Rest as much as you can.  Sleep with your head raised (elevated).  Make sure you get enough sleep each night.  General instructions    Put a warm, moist washcloth on your face 3-4 times a day, or as often as told by your doctor.  Use nasal saline washes as often as told by your doctor.  Wash your hands often with soap and water. If you cannot use soap and water, use hand .  Do not smoke. Avoid being around  people who are smoking (secondhand smoke).  Keep all follow-up visits.  Contact a doctor if:  You have a fever.  Your symptoms get worse.  Your symptoms do not get better within 10 days.  Get help right away if:  You have a very bad headache.  You cannot stop vomiting.  You have very bad pain or swelling around your face or eyes.  You have trouble seeing.  You feel confused.  Your neck is stiff.  You have trouble breathing.  These symptoms may be an emergency. Get help right away. Call 911.  Do not wait to see if the symptoms will go away.  Do not drive yourself to the hospital.  Summary  A sinus infection is swelling of your sinuses. Sinuses are hollow spaces in the bones around your face.  This condition is caused by tissues in your nose that become inflamed or swollen. This traps germs. These can lead to infection.  If you were prescribed an antibiotic medicine, take it as told by your doctor. Do not stop taking it even if you start to feel better.  Keep all follow-up visits.  This information is not intended to replace advice given to you by your health care provider. Make sure you discuss any questions you have with your health care provider.  Document Revised: 11/22/2022 Document Reviewed: 11/22/2022  Elsevier Patient Education © 2024 [employer]

## 2024-10-16 NOTE — PROGRESS NOTES
CHIEF COMPLAINT  Chief Complaint   Patient presents with    Sinusitis    Cough         HPI  Marlon Mccarthy is a 44 y.o. male  presents with complaint of sinusitis and cough. He has had this a week and worsened over the last 2 days. He usually takes benzonatate caps and azithromycin. He also uses promethazine DM.     Review of Systems   Constitutional:  Positive for fatigue. Negative for chills, diaphoresis and fever.   HENT:  Positive for congestion, postnasal drip, rhinorrhea, sinus pressure and sinus pain. Negative for sneezing and sore throat.    Respiratory:  Positive for cough. Negative for chest tightness, shortness of breath and wheezing.    Neurological:  Positive for headaches.       Past Medical History:   Diagnosis Date    Anxiety     Diverticulitis of colon     Diverticulosis     Hypertension     Non morbid obesity due to excess calories 05/16/2017    SVT (supraventricular tachycardia)        Family History   Problem Relation Age of Onset    Diabetes Mother     Hypertension Mother     Stroke Mother     Hypertension Father     No Known Problems Sister     No Known Problems Sister     No Known Problems Maternal Grandmother     No Known Problems Maternal Grandfather     No Known Problems Paternal Grandmother     No Known Problems Paternal Grandfather        Social History     Socioeconomic History    Marital status:    Tobacco Use    Smoking status: Never     Passive exposure: Never    Smokeless tobacco: Never   Vaping Use    Vaping status: Never Used   Substance and Sexual Activity    Alcohol use: Yes     Alcohol/week: 7.0 standard drinks of alcohol     Types: 7 Cans of beer per week     Comment: social    Drug use: No    Sexual activity: Not Currently     Partners: Female     Birth control/protection: Condom         There were no vitals taken for this visit.    PHYSICAL EXAM  Physical Exam   Constitutional: He is oriented to person, place, and time. He appears well-developed and well-nourished.  He does not have a sickly appearance. He does not appear ill. No distress.   HENT:   Head: Normocephalic and atraumatic.   Nose: Rhinorrhea and congestion present.   Eyes: EOM are normal.   Neck: Neck normal appearance.  Pulmonary/Chest: Effort normal.  No respiratory distress.  Neurological: He is alert and oriented to person, place, and time.   Skin: Skin is dry.   Psychiatric: He has a normal mood and affect.           Diagnoses and all orders for this visit:    1. Acute pansinusitis, recurrence not specified (Primary)    Other orders  -     azithromycin (Zithromax Z-Zafar) 250 MG tablet; Take 2 tablets by mouth on day 1, then 1 tablet daily on days 2-5  Dispense: 6 tablet; Refill: 0  -     promethazine-dextromethorphan (PROMETHAZINE-DM) 6.25-15 MG/5ML syrup; Take 5 mL by mouth 4 (Four) Times a Day As Needed for Cough.  Dispense: 120 mL; Refill: 0  -     benzonatate (TESSALON) 200 MG capsule; Take 1 capsule by mouth 3 (Three) Times a Day As Needed for Cough.  Dispense: 20 capsule; Refill: 0        The use of a video visit has been reviewed with the patient and verbal informed consent has been obtained. Myself and Marlon Mccarthy participated in this visit. The patient is located in [address] [place] [address]. I am located in [place], [place]. Mychart and Twilio were utilized.       Note Disclaimer: At [facility], we believe that sharing information builds trust and better   relationships. You are receiving this note because you recently visited [facility]. It is possible you   will see health information before a provider has talked with you about it. This kind of information can   be easy to misunderstand. To help you fully understand what it means for your health, we urge you to   discuss this note with your provider.    Ofelia Omalley, KARLA  10/16/2024  01:33 EDT

## 2024-10-18 ENCOUNTER — TELEMEDICINE (OUTPATIENT)
Dept: FAMILY MEDICINE CLINIC | Facility: TELEHEALTH | Age: 44
End: 2024-10-18
Payer: COMMERCIAL

## 2024-10-18 DIAGNOSIS — J06.9 UPPER RESPIRATORY TRACT INFECTION, UNSPECIFIED TYPE: Primary | ICD-10-CM

## 2024-10-18 RX ORDER — PREDNISONE 20 MG/1
20 TABLET ORAL DAILY
Qty: 21 TABLET | Refills: 0 | Status: SHIPPED | OUTPATIENT
Start: 2024-10-18

## 2024-10-18 NOTE — PROGRESS NOTES
You have chosen to receive care through a telehealth visit.  Do you consent to use a video/audio connection for your medical care today? Yes     HPI  Marlon Mccarthy is a 44 y.o. male  presents with complaint of wheezing and mild shortness of breath that he thinks has been triggered by the seasonal allergies/weather changes. He has been prescribed a Z-rodrigo but he is requesting a steroid pack for the wheezing. No fever. Cough is occasionally productive with white phlegm and occasional yellow green in color.     Review of Systems   Constitutional: Negative.    HENT:  Positive for congestion.    Respiratory:  Positive for cough, shortness of breath and wheezing.    Allergic/Immunologic: Positive for environmental allergies.   Neurological: Negative.    Hematological: Negative.    Psychiatric/Behavioral: Negative.         Past Medical History:   Diagnosis Date    Anxiety     Diverticulitis of colon     Diverticulosis     Hypertension     Non morbid obesity due to excess calories 05/16/2017    SVT (supraventricular tachycardia)        Family History   Problem Relation Age of Onset    Diabetes Mother     Hypertension Mother     Stroke Mother     Hypertension Father     No Known Problems Sister     No Known Problems Sister     No Known Problems Maternal Grandmother     No Known Problems Maternal Grandfather     No Known Problems Paternal Grandmother     No Known Problems Paternal Grandfather        Social History     Socioeconomic History    Marital status:    Tobacco Use    Smoking status: Never     Passive exposure: Never    Smokeless tobacco: Never   Vaping Use    Vaping status: Never Used   Substance and Sexual Activity    Alcohol use: Yes     Alcohol/week: 7.0 standard drinks of alcohol     Types: 7 Cans of beer per week     Comment: social    Drug use: No    Sexual activity: Not Currently     Partners: Female     Birth control/protection: Condom         There were no vitals taken for this visit.    PHYSICAL  EXAM  Physical Exam   Constitutional: He is oriented to person, place, and time. He appears well-developed and well-nourished. He does not have a sickly appearance. He does not appear ill. No distress.   HENT:   Head: Normocephalic and atraumatic.   Right Ear: Hearing normal.   Left Ear: Hearing normal.   Nose: Nose normal.   Mouth/Throat: Mouth/Lips are normal.  Pulmonary/Chest: Effort normal.  No respiratory distress.  Neurological: He is alert and oriented to person, place, and time.   Psychiatric: He has a normal mood and affect.   Vitals reviewed.      Diagnoses and all orders for this visit:    1. Upper respiratory tract infection, unspecified type (Primary)  -     predniSONE (DELTASONE) 20 MG tablet; Take 1 tablet by mouth Daily.  Dispense: 21 tablet; Refill: 0    Cautioned to take steroids in the morning always with food.   Continue Zpak as directed.   Fluids and rest.       FOLLOW-UP  As discussed during visit with [facility], if symptoms worsen or fail to improve, follow-up with PCP/Urgent Care/Emergency Department.    Patient verbalizes understanding of medications, instructions for treatment and follow-up.    Ghada Velasquez, APRN  10/18/2024  09:09 EDT    The use of a video visit has been reviewed with the patient and verbal informed consent has been obtained. Myself and Marlon Mccarthy participated in this visit. The patient is located in [place], [place], and I am located in [place], [place]. [employer] and [employer]  were utilized.

## 2024-10-18 NOTE — Clinical Note
October 18, 2024     Patient: Marlon Mccarthy   YOB: 1980   Date of Visit: 10/18/2024       To Whom It May Concern:    It is my medical opinion that Marlon Mccarthy may return to work in one day.            Sincerely,        KARLA Morales    CC: No Recipients

## 2024-11-05 ENCOUNTER — HOSPITAL ENCOUNTER (EMERGENCY)
Facility: HOSPITAL | Age: 44
Discharge: HOME | End: 2024-11-05
Attending: EMERGENCY MEDICINE | Admitting: EMERGENCY MEDICINE
Payer: COMMERCIAL

## 2024-11-05 ENCOUNTER — APPOINTMENT (OUTPATIENT)
Dept: GENERAL RADIOLOGY | Facility: HOSPITAL | Age: 44
End: 2024-11-05
Payer: COMMERCIAL

## 2024-11-05 VITALS
HEIGHT: 77 IN | SYSTOLIC BLOOD PRESSURE: 166 MMHG | OXYGEN SATURATION: 96 % | DIASTOLIC BLOOD PRESSURE: 105 MMHG | HEART RATE: 95 BPM | WEIGHT: 315 LBS | TEMPERATURE: 98.2 F | BODY MASS INDEX: 37.19 KG/M2 | RESPIRATION RATE: 20 BRPM

## 2024-11-05 DIAGNOSIS — I47.10 PAROXYSMAL SUPRAVENTRICULAR TACHYCARDIA: Primary | ICD-10-CM

## 2024-11-05 DIAGNOSIS — I10 PRIMARY HYPERTENSION: ICD-10-CM

## 2024-11-05 LAB
AMPHET+METHAMPHET UR QL: NEGATIVE
AMPHETAMINES UR QL: NEGATIVE
ANION GAP SERPL CALCULATED.3IONS-SCNC: 13 MMOL/L (ref 5–15)
BARBITURATES UR QL SCN: NEGATIVE
BASOPHILS # BLD AUTO: 0.06 10*3/MM3 (ref 0–0.2)
BASOPHILS NFR BLD AUTO: 0.6 % (ref 0–1.5)
BENZODIAZ UR QL SCN: NEGATIVE
BUN SERPL-MCNC: 11 MG/DL (ref 6–20)
BUN/CREAT SERPL: 14.3 (ref 7–25)
BUPRENORPHINE SERPL-MCNC: NEGATIVE NG/ML
CALCIUM SPEC-SCNC: 9.5 MG/DL (ref 8.6–10.5)
CANNABINOIDS SERPL QL: NEGATIVE
CHLORIDE SERPL-SCNC: 100 MMOL/L (ref 98–107)
CO2 SERPL-SCNC: 26 MMOL/L (ref 22–29)
COCAINE UR QL: NEGATIVE
CREAT SERPL-MCNC: 0.77 MG/DL (ref 0.76–1.27)
D DIMER PPP FEU-MCNC: 0.6 MCGFEU/ML (ref 0–0.5)
DEPRECATED RDW RBC AUTO: 45.9 FL (ref 37–54)
EGFRCR SERPLBLD CKD-EPI 2021: 112.5 ML/MIN/1.73
EOSINOPHIL # BLD AUTO: 0.28 10*3/MM3 (ref 0–0.4)
EOSINOPHIL NFR BLD AUTO: 2.6 % (ref 0.3–6.2)
ERYTHROCYTE [DISTWIDTH] IN BLOOD BY AUTOMATED COUNT: 14.4 % (ref 12.3–15.4)
FENTANYL UR-MCNC: NEGATIVE NG/ML
GLUCOSE SERPL-MCNC: 108 MG/DL (ref 65–99)
HCT VFR BLD AUTO: 51 % (ref 37.5–51)
HGB BLD-MCNC: 16 G/DL (ref 13–17.7)
IMM GRANULOCYTES # BLD AUTO: 0.04 10*3/MM3 (ref 0–0.05)
IMM GRANULOCYTES NFR BLD AUTO: 0.4 % (ref 0–0.5)
LYMPHOCYTES # BLD AUTO: 3.04 10*3/MM3 (ref 0.7–3.1)
LYMPHOCYTES NFR BLD AUTO: 28.1 % (ref 19.6–45.3)
MCH RBC QN AUTO: 27.4 PG (ref 26.6–33)
MCHC RBC AUTO-ENTMCNC: 31.4 G/DL (ref 31.5–35.7)
MCV RBC AUTO: 87.5 FL (ref 79–97)
METHADONE UR QL SCN: NEGATIVE
MONOCYTES # BLD AUTO: 0.96 10*3/MM3 (ref 0.1–0.9)
MONOCYTES NFR BLD AUTO: 8.9 % (ref 5–12)
NEUTROPHILS NFR BLD AUTO: 59.4 % (ref 42.7–76)
NEUTROPHILS NFR BLD AUTO: 6.45 10*3/MM3 (ref 1.7–7)
NRBC BLD AUTO-RTO: 0 /100 WBC (ref 0–0.2)
OPIATES UR QL: NEGATIVE
OXYCODONE UR QL SCN: NEGATIVE
PCP UR QL SCN: NEGATIVE
PLATELET # BLD AUTO: 259 10*3/MM3 (ref 140–450)
PMV BLD AUTO: 10.1 FL (ref 6–12)
POTASSIUM SERPL-SCNC: 3.5 MMOL/L (ref 3.5–5.2)
RBC # BLD AUTO: 5.83 10*6/MM3 (ref 4.14–5.8)
SODIUM SERPL-SCNC: 139 MMOL/L (ref 136–145)
TRICYCLICS UR QL SCN: NEGATIVE
WBC NRBC COR # BLD AUTO: 10.83 10*3/MM3 (ref 3.4–10.8)

## 2024-11-05 PROCEDURE — 71045 X-RAY EXAM CHEST 1 VIEW: CPT

## 2024-11-05 PROCEDURE — 96366 THER/PROPH/DIAG IV INF ADDON: CPT

## 2024-11-05 PROCEDURE — 93005 ELECTROCARDIOGRAM TRACING: CPT | Performed by: EMERGENCY MEDICINE

## 2024-11-05 PROCEDURE — 80307 DRUG TEST PRSMV CHEM ANLYZR: CPT | Performed by: EMERGENCY MEDICINE

## 2024-11-05 PROCEDURE — 96376 TX/PRO/DX INJ SAME DRUG ADON: CPT

## 2024-11-05 PROCEDURE — 96365 THER/PROPH/DIAG IV INF INIT: CPT

## 2024-11-05 PROCEDURE — 85025 COMPLETE CBC W/AUTO DIFF WBC: CPT | Performed by: EMERGENCY MEDICINE

## 2024-11-05 PROCEDURE — 80048 BASIC METABOLIC PNL TOTAL CA: CPT | Performed by: EMERGENCY MEDICINE

## 2024-11-05 PROCEDURE — 85379 FIBRIN DEGRADATION QUANT: CPT | Performed by: EMERGENCY MEDICINE

## 2024-11-05 PROCEDURE — 99284 EMERGENCY DEPT VISIT MOD MDM: CPT

## 2024-11-05 RX ORDER — ADENOSINE 3 MG/ML
6 INJECTION, SOLUTION INTRAVENOUS ONCE
Status: DISCONTINUED | OUTPATIENT
Start: 2024-11-05 | End: 2024-11-05 | Stop reason: ALTCHOICE

## 2024-11-05 RX ORDER — ADENOSINE 3 MG/ML
12 INJECTION, SOLUTION INTRAVENOUS ONCE
Status: DISCONTINUED | OUTPATIENT
Start: 2024-11-05 | End: 2024-11-05 | Stop reason: HOSPADM

## 2024-11-05 RX ORDER — DILTIAZEM HYDROCHLORIDE 5 MG/ML
10 INJECTION INTRAVENOUS ONCE
Status: COMPLETED | OUTPATIENT
Start: 2024-11-05 | End: 2024-11-05

## 2024-11-05 RX ORDER — SODIUM CHLORIDE 0.9 % (FLUSH) 0.9 %
10 SYRINGE (ML) INJECTION AS NEEDED
Status: DISCONTINUED | OUTPATIENT
Start: 2024-11-05 | End: 2024-11-05 | Stop reason: HOSPADM

## 2024-11-05 RX ORDER — DILTIAZEM HCL/D5W 125 MG/125
5-15 PLASTIC BAG, INJECTION (ML) INTRAVENOUS
Status: DISCONTINUED | OUTPATIENT
Start: 2024-11-05 | End: 2024-11-05 | Stop reason: HOSPADM

## 2024-11-05 RX ADMIN — Medication 5 MG/HR: at 09:41

## 2024-11-05 RX ADMIN — DILTIAZEM HYDROCHLORIDE 10 MG: 5 INJECTION INTRAVENOUS at 11:22

## 2024-11-05 NOTE — DISCHARGE INSTRUCTIONS
It was very nice to meet you, jorge alberto. Thank you for allowing us to take care of you today at [facility].     Today you were seen in the emergency department for your symptoms. Please understand that an ER evaluation is just the start of your evaluation. We do the best we can, but we are often unable to fully find what is causing your symptoms from one evaluation.  Because of this, the goal is to determine whether you need to be evaluated in the hospital or if it is safe for you to go home and see other doctors provided such as primary care physicians or specialist on an outpatient basis.      Like we discussed, I strongly urge that you follow up with your primary care doctor. Please call their office to set up an appointment as soon as possible so that you can be re-evaluated for improvement in your symptoms or for any other questions.  I have provided the information needed, including phone number, to call to set up an appointment below in these discharge papers.      Educational material has also been provided in the following pages regarding what we have discussed today.  Please follow-up with your primary MD he also need to follow-up with cardiology and electrophysiology for possible ablation     Please return to the emergency room within 12-48 hours if you experience symptoms such as the following:   Fever, chills, chest pain or shortness of breath, pain with inspiration/expiration, pain that travels to your arms, neck or back, nausea, vomiting, severe headache, tearing pain in your chest, dizziness, feel as though you are about to pass out, OR if you have any worsening symptoms, or any other concerns.

## 2024-11-05 NOTE — ED PROVIDER NOTES
Subjective   History of Present Illness  Patient is a 44-year-old physical history of PSVT came into the ER with rapid heartbeat.  He is morbidly obese.  Denies any chest pain or shortness of breath    Palpitations  Palpitations quality:  Regular  Onset quality:  Sudden  Timing:  Constant  Chronicity:  Recurrent  Context: not anxiety, not appetite suppressants, not blood loss, not dehydration, not exercise, not illicit drugs and not nicotine    Relieved by:  Nothing  Worsened by:  Nothing  Ineffective treatments:  None tried  Associated symptoms: no back pain, no chest pain, no chest pressure, no cough, no diaphoresis, no dizziness, no lower extremity edema, no malaise/fatigue, no nausea, no near-syncope, no numbness, no PND, no shortness of breath, no syncope, no vomiting and no weakness    Risk factors: no heart disease, no hx of PE and no hx of thyroid disease        Review of Systems   Constitutional: Negative.  Negative for diaphoresis and malaise/fatigue.   HENT: Negative.     Respiratory:  Negative for cough and shortness of breath.    Cardiovascular:  Positive for palpitations. Negative for chest pain, syncope, PND and near-syncope.   Gastrointestinal: Negative.  Negative for abdominal distention, abdominal pain, nausea and vomiting.   Endocrine: Negative.    Genitourinary: Negative.    Musculoskeletal: Negative.  Negative for back pain and neck pain.   Skin:  Negative for color change and pallor.   Neurological: Negative.  Negative for dizziness, syncope, weakness, light-headedness, numbness and headaches.   Hematological: Negative.  Does not bruise/bleed easily.   All other systems reviewed and are negative.      Past Medical History:   Diagnosis Date    Anxiety     Diverticulitis of colon     Diverticulosis     Hypertension     Non morbid obesity due to excess calories 05/16/2017    SVT (supraventricular tachycardia)        Allergies   Allergen Reactions    Lisinopril Cough       Past Surgical History:    Procedure Laterality Date    COLONOSCOPY      HERNIA REPAIR      INGUINAL HERNIA REPAIR         Family History   Problem Relation Age of Onset    Diabetes Mother     Hypertension Mother     Stroke Mother     Hypertension Father     No Known Problems Sister     No Known Problems Sister     No Known Problems Maternal Grandmother     No Known Problems Maternal Grandfather     No Known Problems Paternal Grandmother     No Known Problems Paternal Grandfather        Social History     Socioeconomic History    Marital status:    Tobacco Use    Smoking status: Never     Passive exposure: Never    Smokeless tobacco: Never   Vaping Use    Vaping status: Never Used   Substance and Sexual Activity    Alcohol use: Yes     Alcohol/week: 7.0 standard drinks of alcohol     Types: 7 Cans of beer per week     Comment: social    Drug use: No    Sexual activity: Not Currently     Partners: Female     Birth control/protection: Condom           Objective   Physical Exam  Vitals and nursing note reviewed. Exam conducted with a chaperone present.   Constitutional:       General: He is not in acute distress.     Appearance: Normal appearance. He is well-developed. He is obese. He is not toxic-appearing.   HENT:      Head: Normocephalic and atraumatic.      Nose: Nose normal.      Mouth/Throat:      Mouth: Mucous membranes are moist.      Pharynx: Uvula midline.   Eyes:      General: Lids are normal. Lids are everted, no foreign bodies appreciated.      Conjunctiva/sclera: Conjunctivae normal.      Pupils: Pupils are equal, round, and reactive to light.   Neck:      Vascular: Normal carotid pulses. No carotid bruit or JVD.      Trachea: Trachea and phonation normal. No tracheal deviation.   Cardiovascular:      Rate and Rhythm: Regular rhythm. Tachycardia present.      Chest Wall: PMI is not displaced.      Pulses: Normal pulses.      Heart sounds: Normal heart sounds.      No gallop.   Pulmonary:      Effort: Pulmonary effort is  normal. No tachypnea, accessory muscle usage or respiratory distress.      Breath sounds: Normal breath sounds. No stridor. No decreased breath sounds, wheezing, rhonchi or rales.   Abdominal:      General: Bowel sounds are normal. There is no distension.      Palpations: Abdomen is soft.      Tenderness: There is no abdominal tenderness.   Musculoskeletal:         General: No swelling. Normal range of motion.      Cervical back: Full passive range of motion without pain, normal range of motion and neck supple. No rigidity.      Comments: Lower extremity exam bilaterally is unremarkable.  There is no right or left calf tenderness .  There is no palpable venous cord.  No obvious difference in the size of the legs.  No pitting edema.  The dorsalis pedis and posterior tibial femoral and popliteal pulses are palpable and +2 bilaterally.  Homans sign is negative   Skin:     General: Skin is warm and dry.      Capillary Refill: Capillary refill takes less than 2 seconds.      Coloration: Skin is not jaundiced or pale.      Nails: There is no clubbing.   Neurological:      General: No focal deficit present.      Mental Status: He is alert and oriented to person, place, and time.      GCS: GCS eye subscore is 4. GCS verbal subscore is 5. GCS motor subscore is 6.      Cranial Nerves: No cranial nerve deficit.      Motor: Motor function is intact.      Gait: Gait normal.      Deep Tendon Reflexes: Reflexes are normal and symmetric. Reflexes normal.   Psychiatric:         Speech: Speech normal.         Behavior: Behavior normal.         Procedures           ED Course  ED Course as of 11/05/24 1142   Tue Nov 05, 2024   0928 Patient is anxious about the adenosine he is thinking about it [TS]   0930 Patient is not ready to get adenosine  We will try Cardizem [TS]   0932 psvt [TS]   1138 Gentleman came in to the ED with paroxysmal supraventricular tachycardia did not want to get adenosine patient on Cardizem drip and Cardizem  bolus.  His heart rate is down 212 bpm with a blood pressure 152/105 my recommendation to the patient was to get admitted to the hospital for the evaluation and treatment of his condition and for further workup since he has had these episodes frequently.  Patient is awake and alert and does not want to do that and wants to go home the possibility of increased morbidity mortality associate with incomplete workup has been discussed with the patient at length he has follow-up appointments with the EP therefore as per his request and will discharge him home he has been advised in there for any worsening symptoms. [TS]   1139 Patient states that his resting heart rate at times is in the range of  and his blood pressure usually is elevated when he is in the hospital because of anxiety. [TS]   1139 The patient has requested to leave the ED and does not want to be admitted nor does he want any further work-up at this time. The patient reason(s) for leaving include, but are not limited to, the following: Feels better and wants to go home. I believe this patient is of sound mind and competent to refuse medical care. The patient is responding and asking questions appropriately. The patient is oriented to person, place and time. The patient is not psychotic, delusional, suicidal, homicidal or hallucinating. The patient demonstrates a normal mental capacity to make decisions regarding their healthcare. The patient is clinically sober and does not appear to be under the influence of any illicit drugs at this time. The patient has been advised of the risks, in layman terms, of leaving  which include, but are not limited to death, coma, permanent disability, loss of current lifestyle, delay in diagnosis. Alternatives have been offered - the patient remains steadfast in their wish to leave. The patient has been advised that should they change their mind they are welcome to return to this hospital, or any other,  I have  provided appropriate prescriptions, referrals, and discharge instructions.. The above discussion was witnessed by another member of staff.  [TS]   1139 His urine drug screen is still pending.  The reason to get a drug screen was because of recurrent tachycardias hypotension but the patient wants to go home. [TS]      ED Course User Index  [TS] Palmer Watkins MD                                               Medical Decision Making  Palpitations history of PSVT        Final diagnoses:   Paroxysmal supraventricular tachycardia   Primary hypertension       ED Disposition  ED Disposition       ED Disposition   Discharge    Condition   Stable    Comment   --               BROOKE Sawyer MD  [address] [address]  [address]  [place] [id number]  [phone]    Schedule an appointment as soon as possible for a visit            Medication List      No changes were made to your prescriptions during this visit.            Palmer Watkins MD  11/05/24 1214

## 2024-11-06 LAB
QT INTERVAL: 324 MS
QT INTERVAL: 364 MS

## 2024-11-07 NOTE — ED NOTES
Combo pads attached to pt at this time  
MD Coker at bedside upon pt arrival   
Pt refusing to take shirt off upon arrival states he is embarrassed, pt tearful not fully cooperating at this time   
General

## 2024-12-04 LAB
QT INTERVAL: 324 MS
QT INTERVAL: 364 MS
QTC INTERVAL: 490 MS
QTC INTERVAL: 503 MS

## 2024-12-05 ENCOUNTER — TELEMEDICINE (OUTPATIENT)
Dept: FAMILY MEDICINE CLINIC | Facility: TELEHEALTH | Age: 44
End: 2024-12-05
Payer: COMMERCIAL

## 2024-12-05 DIAGNOSIS — J06.9 ACUTE URI: Primary | ICD-10-CM

## 2024-12-05 RX ORDER — PREDNISONE 20 MG/1
20 TABLET ORAL 2 TIMES DAILY
Qty: 10 TABLET | Refills: 0 | Status: SHIPPED | OUTPATIENT
Start: 2024-12-05 | End: 2024-12-10

## 2024-12-05 RX ORDER — COVID-19 ANTIGEN TEST
1 KIT MISCELLANEOUS ONCE
Qty: 1 KIT | Refills: 0 | Status: SHIPPED | OUTPATIENT
Start: 2024-12-05 | End: 2024-12-05

## 2024-12-05 RX ORDER — DEXTROMETHORPHAN HYDROBROMIDE AND PROMETHAZINE HYDROCHLORIDE 15; 6.25 MG/5ML; MG/5ML
5 SYRUP ORAL 4 TIMES DAILY PRN
Qty: 140 ML | Refills: 0 | Status: SHIPPED | OUTPATIENT
Start: 2024-12-05 | End: 2024-12-12

## 2024-12-05 NOTE — PROGRESS NOTES
You have chosen to receive care through a telehealth visit.  Do you consent to use a video/audio connection for your medical care today? Yes     CHIEF COMPLAINT  Chief Complaint   Patient presents with    Nasal Congestion         HPI  Marlon Mccarthy is a 44 y.o. male  presents with complaint of nasal congestion. Reports his symptoms started yesterday morning. Reports he has been sneezing. Reports his throat is scratchy from drainage. Difficult to lay down due to increased drainage. Reports he is having a mild cough. Reports no fever or chills. No nausea or vomiting. Denies any chest pain or SOA. Reports he has taken Theraflu, mucinex and Lucy seltzer that is not working.      Review of Systems   Constitutional:  Negative for chills, fatigue and fever.   HENT:  Positive for congestion, sinus pressure, sneezing and sore throat (scratchy throat). Negative for ear discharge, ear pain and sinus pain.    Respiratory:  Positive for cough. Negative for chest tightness, shortness of breath and wheezing.    Cardiovascular:  Negative for chest pain.   Gastrointestinal:  Negative for abdominal pain, diarrhea, nausea and vomiting.   Musculoskeletal:  Negative for back pain and myalgias.   Neurological:  Negative for dizziness and headaches.   Psychiatric/Behavioral: Negative.         Past Medical History:   Diagnosis Date    Anxiety     Diverticulitis of colon     Diverticulosis     Hypertension     Non morbid obesity due to excess calories 05/16/2017    SVT (supraventricular tachycardia)        Family History   Problem Relation Age of Onset    Diabetes Mother     Hypertension Mother     Stroke Mother     Hypertension Father     No Known Problems Sister     No Known Problems Sister     No Known Problems Maternal Grandmother     No Known Problems Maternal Grandfather     No Known Problems Paternal Grandmother     No Known Problems Paternal Grandfather        Social History     Socioeconomic History    Marital status:     Tobacco Use    Smoking status: Never     Passive exposure: Never    Smokeless tobacco: Never   Vaping Use    Vaping status: Never Used   Substance and Sexual Activity    Alcohol use: Yes     Alcohol/week: 7.0 standard drinks of alcohol     Types: 7 Cans of beer per week     Comment: social    Drug use: No    Sexual activity: Not Currently     Partners: Female     Birth control/protection: Condom       Marlon Mccarthy  reports that he has never smoked. He has never been exposed to tobacco smoke. He has never used smokeless tobacco. I have educated him on the risk of diseases from using tobacco products such as cancer, COPD, and heart disease.         I spent  1  minutes counseling the patient.              There were no vitals taken for this visit.    PHYSICAL EXAM  Physical Exam   Constitutional: He is oriented to person, place, and time. He appears well-developed and well-nourished. No distress.   HENT:   Head: Normocephalic and atraumatic.   Right Ear: Hearing normal.   Left Ear: Hearing normal.   Nose: Congestion present. Right sinus exhibits maxillary sinus tenderness. Left sinus exhibits maxillary sinus tenderness.   Mouth/Throat: Mouth/Lips are normal.  Eyes: Conjunctivae and lids are normal.   Pulmonary/Chest: Effort normal.  No respiratory distress.  Neurological: He is alert and oriented to person, place, and time.   Psychiatric: He has a normal mood and affect. His speech is normal and behavior is normal.           Diagnoses and all orders for this visit:    1. Acute URI (Primary)    Other orders  -     COVID-19 At Home Antigen Test kit; 1 kit by In Vitro route 1 (One) Time for 1 dose.  Dispense: 1 kit; Refill: 0  -     predniSONE (DELTASONE) 20 MG tablet; Take 1 tablet by mouth 2 (Two) Times a Day for 5 days.  Dispense: 10 tablet; Refill: 0  -     promethazine-dextromethorphan (PROMETHAZINE-DM) 6.25-15 MG/5ML syrup; Take 5 mL by mouth 4 (Four) Times a Day As Needed for Cough for up to 7 days.   Dispense: 140 mL; Refill: 0    Rest  Fluids  Take a home covid test  PCP if symptoms persist   ER for any worsening symptoms such as high fever, chest pain or SOA       FOLLOW-UP  As discussed during visit with PCP/Virtual Care if no improvement or Urgent Care/Emergency Department if worsening of symptoms    Patient verbalizes understanding of medication dosage, comfort measures, instructions for treatment and follow-up.    Allie Dinh Jenkins, APRN  12/05/2024  04:46 EST    Mode of Visit: Video  Location of patient: -HOME-  Location of provider: +HOME+  You have chosen to receive care through a telehealth visit.  The patient has signed the video visit consent form.  The visit included audio and video interaction. No technical issues occurred during this visit.      Note Disclaimer: At [facility], we believe that sharing information builds trust and better   relationships. You are receiving this note because you recently visited [facility]. It is possible you   will see health information before a provider has talked with you about it. This kind of information can   be easy to misunderstand. To help you fully understand what it means for your health, we urge you to   discuss this note with your provider.

## 2024-12-05 NOTE — PATIENT INSTRUCTIONS
Upper Respiratory Infection, Adult  An upper respiratory infection (URI) is a common viral infection of the nose, throat, and upper air passages that lead to the lungs. The most common type of URI is the common cold. URIs usually get better on their own, without medical treatment.  What are the causes?  A URI is caused by a virus. You may catch a virus by:  Breathing in droplets from an infected person's cough or sneeze.  Touching something that has been exposed to the virus (is contaminated) and then touching your mouth, nose, or eyes.  What increases the risk?  You are more likely to get a URI if:  You are very young or very old.  You have close contact with others, such as at work, school, or a health care facility.  You smoke.  You have long-term (chronic) heart or lung disease.  You have a weakened disease-fighting system (immune system).  You have nasal allergies or asthma.  You are experiencing a lot of stress.  You have poor nutrition.  What are the signs or symptoms?  A URI usually involves some of the following symptoms:  Runny or stuffy (congested) nose.  Cough.  Sneezing.  Sore throat.  Headache.  Fatigue.  Fever.  Loss of appetite.  Pain in your forehead, behind your eyes, and over your cheekbones (sinus pain).  Muscle aches.  Redness or irritation of the eyes.  Pressure in the ears or face.  How is this diagnosed?  This condition may be diagnosed based on your medical history and symptoms, and a physical exam. Your health care provider may use a swab to take a mucus sample from your nose (nasal swab). This sample can be tested to determine what virus is causing the illness.  How is this treated?  URIs usually get better on their own within 7-10 days. Medicines cannot cure URIs, but your health care provider may recommend certain medicines to help relieve symptoms, such as:  Over-the-counter cold medicines.  Cough suppressants. Coughing is a type of defense against infection that helps to clear the  respiratory system, so take these medicines only as recommended by your health care provider.  Fever-reducing medicines.  Follow these instructions at home:  Activity  Rest as needed.  If you have a fever, stay home from work or school until your fever is gone or until your health care provider says your URI cannot spread to other people (is no longer contagious). Your health care provider may have you wear a face mask to prevent your infection from spreading.  Relieving symptoms  Gargle with a mixture of salt and water 3-4 times a day or as needed. To make salt water, completely dissolve ½-1 tsp (3-6 g) of salt in 1 cup (237 mL) of warm water.  Use a cool-mist humidifier to add moisture to the air. This can help you breathe more easily.  Eating and drinking    Drink enough fluid to keep your urine pale yellow.  Eat soups and other clear broths.  General instructions    Take over-the-counter and prescription medicines only as told by your health care provider. These include cold medicines, fever reducers, and cough suppressants.  Do not use any products that contain nicotine or tobacco. These products include cigarettes, chewing tobacco, and vaping devices, such as e-cigarettes. If you need help quitting, ask your health care provider.  Stay away from secondhand smoke.  Stay up to date on all immunizations, including the yearly (annual) flu vaccine.  Keep all follow-up visits. This is important.  How to prevent the spread of infection to others  URIs can be contagious. To prevent the infection from spreading:  Wash your hands with soap and water for at least 20 seconds. If soap and water are not available, use hand .  Avoid touching your mouth, face, eyes, or nose.  Cough or sneeze into a tissue or your sleeve or elbow instead of into your hand or into the air.    Contact a health care provider if:  You are getting worse instead of better.  You have a fever or chills.  Your mucus is brown or red.  You have  yellow or brown discharge coming from your nose.  You have pain in your face, especially when you bend forward.  You have swollen neck glands.  You have pain while swallowing.  You have white areas in the back of your throat.  Get help right away if:  You have shortness of breath that gets worse.  You have severe or persistent:  Headache.  Ear pain.  Sinus pain.  Chest pain.  You have chronic lung disease along with any of the following:  Making high-pitched whistling sounds when you breathe, most often when you breathe out (wheezing).  Prolonged cough (more than 14 days).  Coughing up blood.  A change in your usual mucus.  You have a stiff neck.  You have changes in your:  Vision.  Hearing.  Thinking.  Mood.  These symptoms may be an emergency. Get help right away. Call 911.  Do not wait to see if the symptoms will go away.  Do not drive yourself to the hospital.  Summary  An upper respiratory infection (URI) is a common infection of the nose, throat, and upper air passages that lead to the lungs.  A URI is caused by a virus.  URIs usually get better on their own within 7-10 days.  Medicines cannot cure URIs, but your health care provider may recommend certain medicines to help relieve symptoms.  This information is not intended to replace advice given to you by your health care provider. Make sure you discuss any questions you have with your health care provider.  Document Revised: 07/20/2022 Document Reviewed: 07/20/2022  [employer] Patient Education © 2024 [employer]

## 2025-01-28 ENCOUNTER — OFFICE VISIT (OUTPATIENT)
Dept: INTERNAL MEDICINE | Facility: CLINIC | Age: 45
End: 2025-01-28
Payer: COMMERCIAL

## 2025-01-28 ENCOUNTER — APPOINTMENT (OUTPATIENT)
Dept: ULTRASOUND IMAGING | Facility: HOSPITAL | Age: 45
End: 2025-01-28
Payer: COMMERCIAL

## 2025-01-28 ENCOUNTER — LAB (OUTPATIENT)
Dept: LAB | Facility: HOSPITAL | Age: 45
End: 2025-01-28
Payer: COMMERCIAL

## 2025-01-28 ENCOUNTER — NURSE TRIAGE (OUTPATIENT)
Dept: CALL CENTER | Facility: HOSPITAL | Age: 45
End: 2025-01-28
Payer: COMMERCIAL

## 2025-01-28 VITALS
DIASTOLIC BLOOD PRESSURE: 80 MMHG | OXYGEN SATURATION: 99 % | HEIGHT: 77 IN | SYSTOLIC BLOOD PRESSURE: 140 MMHG | HEART RATE: 91 BPM | TEMPERATURE: 97.5 F | BODY MASS INDEX: 37.19 KG/M2 | WEIGHT: 315 LBS

## 2025-01-28 DIAGNOSIS — R73.03 PREDIABETES: ICD-10-CM

## 2025-01-28 DIAGNOSIS — I10 BENIGN ESSENTIAL HTN: ICD-10-CM

## 2025-01-28 DIAGNOSIS — E66.01 MORBID OBESITY DUE TO EXCESS CALORIES: ICD-10-CM

## 2025-01-28 DIAGNOSIS — N50.89 SCROTAL EDEMA: ICD-10-CM

## 2025-01-28 DIAGNOSIS — F41.9 ANXIETY: ICD-10-CM

## 2025-01-28 DIAGNOSIS — N50.89 SCROTAL EDEMA: Primary | ICD-10-CM

## 2025-01-28 LAB
ANION GAP SERPL CALCULATED.3IONS-SCNC: 10 MMOL/L (ref 5–15)
BUN SERPL-MCNC: 12 MG/DL (ref 6–20)
BUN/CREAT SERPL: 19 (ref 7–25)
CALCIUM SPEC-SCNC: 9.5 MG/DL (ref 8.6–10.5)
CHLORIDE SERPL-SCNC: 102 MMOL/L (ref 98–107)
CHOLEST SERPL-MCNC: 174 MG/DL (ref 0–200)
CO2 SERPL-SCNC: 26 MMOL/L (ref 22–29)
CREAT SERPL-MCNC: 0.63 MG/DL (ref 0.76–1.27)
EGFRCR SERPLBLD CKD-EPI 2021: 118.8 ML/MIN/1.73
GLUCOSE SERPL-MCNC: 107 MG/DL (ref 65–99)
HBA1C MFR BLD: 6.3 % (ref 4.8–5.6)
HDLC SERPL-MCNC: 51 MG/DL (ref 40–60)
LDLC SERPL CALC-MCNC: 109 MG/DL (ref 0–100)
LDLC/HDLC SERPL: 2.12 {RATIO}
POTASSIUM SERPL-SCNC: 3.9 MMOL/L (ref 3.5–5.2)
SODIUM SERPL-SCNC: 138 MMOL/L (ref 136–145)
TRIGL SERPL-MCNC: 75 MG/DL (ref 0–150)
VLDLC SERPL-MCNC: 14 MG/DL (ref 5–40)

## 2025-01-28 PROCEDURE — 83036 HEMOGLOBIN GLYCOSYLATED A1C: CPT

## 2025-01-28 PROCEDURE — 80061 LIPID PANEL: CPT

## 2025-01-28 PROCEDURE — 80048 BASIC METABOLIC PNL TOTAL CA: CPT

## 2025-01-28 PROCEDURE — 99214 OFFICE O/P EST MOD 30 MIN: CPT | Performed by: INTERNAL MEDICINE

## 2025-01-28 PROCEDURE — 36415 COLL VENOUS BLD VENIPUNCTURE: CPT

## 2025-01-28 NOTE — TELEPHONE ENCOUNTER
"Has appt at 0736  Running behind, wants to know if can reschedule for later today  Advised to call back when office is open  Reason for Disposition   Requesting regular office appointment    Additional Information   Negative: [1] Caller is not with the adult (patient) AND [2] reporting urgent symptoms   Negative: Lab result questions   Negative: Medication questions   Negative: Caller can't be reached by phone   Negative: Caller has already spoken to PCP or another triager   Negative: RN needs further essential information from caller in order to complete triage    Answer Assessment - Initial Assessment Questions  1. REASON FOR CALL or QUESTION: \"What is your reason for calling today?\" or \"How can I best help you?\" or \"What question do you have that I can help answer?\"      Wanting to reschedule appointment for later today, stated is running behind    Protocols used: Information Only Call - No Triage-ADULT-    "

## 2025-01-28 NOTE — PROGRESS NOTES
Chief Complaint   Patient presents with    Groin Swelling         History:  Marlon Mccarthy is a 45 y.o. male who presents today for evaluation of the above problems.      HPI  History of Present Illness  The patient is a 45-year-old male who presents for a follow-up visit.    He has been experiencing recurrent scrotal swelling, which initially showed signs of improvement. However, last week, he noticed an exacerbation of the condition, with the swelling being more pronounced than before. Despite attempts at self-management through bed rest, elevation, application of ice packs, and sitz baths, there was no relief. The persistent swelling has resulted in significant discomfort, impeding his ability to walk and sit, and causing pain. He also reports difficulty in fitting into his pants due to the enlarged scrotum. He had to abstain from work due to these symptoms last Thursday and Friday but managed to return on Friday with some accommodations. His job involves frequent standing and walking, which exacerbates his discomfort. He continues to take Lasix 20 mg daily and reports no other areas of swelling. He expresses concern about the size of the swelling and its impact on his ability to work and care for his mother. He reports no urinary issues and no presence of sores.     He has been provided with [place] paperwork by his employer due to his missed workdays and flare-ups and is seeking assistance with this documentation.    Of note, blood work was ordered back in July, but he did not have this obtained.  Previous labs showed A1c of 6.4% back in 2022.    He is also referred to bariatric surgery for weight loss options in July but did not schedule an appointment.    He was scheduled to see me 6 times since July 31, 2024 and he canceled all of these visits.    ROS:  Review of Systems   Constitutional:  Negative for chills, diaphoresis, fatigue and fever.   HENT:  Negative for congestion and sore throat.    Respiratory:   Negative for cough.    Cardiovascular:  Negative for chest pain.   Gastrointestinal:  Negative for abdominal pain, nausea and vomiting.   Genitourinary:  Negative for dysuria.   Musculoskeletal:  Negative for myalgias and neck pain.   Skin:  Negative for rash.   Neurological:  Negative for weakness, numbness and headaches.     See above    Current Outpatient Medications:     carvedilol (COREG) 25 MG tablet, Take 1 tablet by mouth 2 (Two) Times a Day With Meals., Disp: 180 tablet, Rfl: 1    losartan (COZAAR) 50 MG tablet, Take 1 tablet by mouth Daily., Disp: 90 tablet, Rfl: 1    furosemide (Lasix) 20 MG tablet, Take 1 tablet by mouth Daily As Needed (scrotal edema). (Patient not taking: Reported on 1/28/2025), Disp: 30 tablet, Rfl: 5    Lab Results   Component Value Date    GLUCOSE 107 (H) 01/28/2025    BUN 12 01/28/2025    CREATININE 0.63 (L) 01/28/2025     01/28/2025    K 3.9 01/28/2025     01/28/2025    CALCIUM 9.5 01/28/2025    PROTEINTOT 8.1 02/11/2024    ALBUMIN 3.5 02/11/2024    ALT 45 (H) 02/11/2024    AST 50 (H) 02/11/2024    ALKPHOS 101 02/11/2024    BILITOT 0.8 02/11/2024    GLOB 4.6 02/11/2024    AGRATIO 0.8 02/11/2024    BCR 19.0 01/28/2025    ANIONGAP 10.0 01/28/2025    EGFR 118.8 01/28/2025       WBC   Date Value Ref Range Status   11/05/2024 10.83 (H) 3.40 - 10.80 10*3/mm3 Final   07/31/2023 10.25 3.40 - 10.80 10*3/mm3 Final     RBC   Date Value Ref Range Status   11/05/2024 5.83 (H) 4.14 - 5.80 10*6/mm3 Final   07/31/2023 5.69 4.14 - 5.80 10*6/mm3 Final     Hemoglobin   Date Value Ref Range Status   11/05/2024 16.0 13.0 - 17.7 g/dL Final     Hematocrit   Date Value Ref Range Status   11/05/2024 51.0 37.5 - 51.0 % Final     MCV   Date Value Ref Range Status   11/05/2024 87.5 79.0 - 97.0 fL Final     MCH   Date Value Ref Range Status   11/05/2024 27.4 26.6 - 33.0 pg Final     MCHC   Date Value Ref Range Status   11/05/2024 31.4 (L) 31.5 - 35.7 g/dL Final     RDW   Date Value Ref Range  "Status   11/05/2024 14.4 12.3 - 15.4 % Final     RDW-SD   Date Value Ref Range Status   11/05/2024 45.9 37.0 - 54.0 fl Final     MPV   Date Value Ref Range Status   11/05/2024 10.1 6.0 - 12.0 fL Final     Platelets   Date Value Ref Range Status   11/05/2024 259 140 - 450 10*3/mm3 Final     Neutrophil %   Date Value Ref Range Status   11/05/2024 59.4 42.7 - 76.0 % Final     Lymphocyte %   Date Value Ref Range Status   11/05/2024 28.1 19.6 - 45.3 % Final     Monocyte %   Date Value Ref Range Status   11/05/2024 8.9 5.0 - 12.0 % Final     Eosinophil %   Date Value Ref Range Status   11/05/2024 2.6 0.3 - 6.2 % Final     Basophil %   Date Value Ref Range Status   11/05/2024 0.6 0.0 - 1.5 % Final     Immature Grans %   Date Value Ref Range Status   11/05/2024 0.4 0.0 - 0.5 % Final     Neutrophils, Absolute   Date Value Ref Range Status   11/05/2024 6.45 1.70 - 7.00 10*3/mm3 Final     Lymphocytes, Absolute   Date Value Ref Range Status   11/05/2024 3.04 0.70 - 3.10 10*3/mm3 Final     Monocytes, Absolute   Date Value Ref Range Status   11/05/2024 0.96 (H) 0.10 - 0.90 10*3/mm3 Final     Eosinophils, Absolute   Date Value Ref Range Status   11/05/2024 0.28 0.00 - 0.40 10*3/mm3 Final     Basophils, Absolute   Date Value Ref Range Status   11/05/2024 0.06 0.00 - 0.20 10*3/mm3 Final     Immature Grans, Absolute   Date Value Ref Range Status   11/05/2024 0.04 0.00 - 0.05 10*3/mm3 Final     nRBC   Date Value Ref Range Status   11/05/2024 0.0 0.0 - 0.2 /100 WBC Final         OBJECTIVE:  Visit Vitals  /80 (BP Location: Left arm, Patient Position: Sitting, Cuff Size: Adult)   Pulse 91   Temp 97.5 °F (36.4 °C) (Temporal)   Ht 195.6 cm (77.01\")   Wt (!) 192 kg (423 lb 9.6 oz)   SpO2 99%   BMI 50.22 kg/m²      Physical Exam  Vitals and nursing note reviewed.   Constitutional:       General: He is not in acute distress.     Appearance: He is well-developed. He is obese. He is not ill-appearing or toxic-appearing.      Comments: " Sitting in a wheelchair   HENT:      Head: Normocephalic and atraumatic.   Eyes:      Pupils: Pupils are equal, round, and reactive to light.   Neck:      Thyroid: No thyromegaly.      Trachea: Phonation normal.   Cardiovascular:      Rate and Rhythm: Normal rate and regular rhythm.      Heart sounds: No murmur heard.  Pulmonary:      Effort: No respiratory distress.   Genitourinary:     Testes:         Right: Swelling (Significant edema with pitting.  Scrotum is estimated to be the size of a football) present.         Left: Swelling present.      Comments: There is no evidence of candidiasis or infection  Skin:     Coloration: Skin is not pale.      Findings: No erythema.   Neurological:      Mental Status: He is alert.   Psychiatric:         Attention and Perception: Attention normal.         Mood and Affect: Mood is depressed. Affect is tearful.         Behavior: Behavior normal. Behavior is cooperative.         Thought Content: Thought content normal. Thought content does not include suicidal ideation.         Judgment: Judgment normal.       Physical Exam  The scrotum is very tender. No sores are present.    Vital Signs  Blood pressure is normal.    Results  Laboratory Studies July 17, 2022  Previous A1c was 6.4 percent    Assessment/Plan      Diagnoses and all orders for this visit:    1. Scrotal edema (Primary)  -     Ambulatory Referral to Urology  -     US Scrotum & Testicles; Future    2. Benign essential HTN    3. Prediabetes    4. Morbid obesity due to excess calories  -     Ambulatory Referral to Bariatric Surgery    5. Anxiety      Assessment & Plan  1. Scrotal edema.  The patient's scrotal edema is likely attributable to his weight, necessitating further weight loss despite previous efforts. His blood pressure readings are within normal limits. He has been noncompliant with his lab work, which was ordered in July 2024, and has not been completed. Consequently, his current kidney function status is  unknown. He has been missing a lot of appointments. He is advised to prioritize his health by incorporating regular exercise into his routine, dedicating at least 30 to 45 minutes three to four times a week. A comprehensive lab workup, including a lipid panel, A1c, and BMP, will be conducted today. An ultrasound of the scrotum will be ordered. He will be referred back to urology for further evaluation. Additionally, a referral to the bariatric surgery team will be made to address his weight loss needs. The dosage of Lasix may be increased pending the results of his lab work.    He request that I complete [place] paperwork.  I advised that he will need to comply with medical care before such paperwork could be completed.      His lab work was reviewed.  A1c 6.3%, .  Renal function is good.    I will have him increase his Lasix to 40 mg daily for 1 week.  Increased dose was not sent to his pharmacy yet, but he was instructed to take 2 20 mg tablets of Lasix for the next 1 week.  Follow-up  The patient will follow up in 1 week.      Return in about 1 week (around 2/4/2025) for Recheck with me 30minutes.      RAFAEL Sawyer MD  07:51 CST  1/28/2025   Electronically signed    Patient or patient representative verbalized consent for the use of Ambient Listening during the visit with  BROOKE Sawyer MD for chart documentation. 1/28/2025  12:30 CST

## 2025-02-04 ENCOUNTER — HOSPITAL ENCOUNTER (OUTPATIENT)
Dept: ULTRASOUND IMAGING | Facility: HOSPITAL | Age: 45
Discharge: HOME | End: 2025-02-04
Admitting: INTERNAL MEDICINE
Payer: COMMERCIAL

## 2025-02-04 ENCOUNTER — OFFICE VISIT (OUTPATIENT)
Dept: INTERNAL MEDICINE | Facility: CLINIC | Age: 45
End: 2025-02-04
Payer: COMMERCIAL

## 2025-02-04 VITALS
HEART RATE: 104 BPM | BODY MASS INDEX: 37.19 KG/M2 | WEIGHT: 315 LBS | HEIGHT: 77 IN | DIASTOLIC BLOOD PRESSURE: 92 MMHG | TEMPERATURE: 98.1 F | OXYGEN SATURATION: 95 % | SYSTOLIC BLOOD PRESSURE: 142 MMHG

## 2025-02-04 DIAGNOSIS — N50.89 SCROTAL EDEMA: Primary | ICD-10-CM

## 2025-02-04 DIAGNOSIS — E66.01 MORBID OBESITY DUE TO EXCESS CALORIES: ICD-10-CM

## 2025-02-04 DIAGNOSIS — R73.03 PREDIABETES: ICD-10-CM

## 2025-02-04 DIAGNOSIS — N50.89 SCROTAL EDEMA: ICD-10-CM

## 2025-02-04 PROCEDURE — 99215 OFFICE O/P EST HI 40 MIN: CPT | Performed by: INTERNAL MEDICINE

## 2025-02-04 PROCEDURE — 76870 US EXAM SCROTUM: CPT

## 2025-02-04 RX ORDER — FUROSEMIDE 40 MG/1
40 TABLET ORAL DAILY PRN
Qty: 30 TABLET | Refills: 2 | Status: SHIPPED | OUTPATIENT
Start: 2025-02-04

## 2025-02-04 NOTE — PROGRESS NOTES
Chief Complaint   Patient presents with    Follow-up     swelling         History:  Marlon Mccarthy is a 45 y.o. male who presents today for evaluation of the above problems.      HPI  History of Present Illness  The patient presents for a 1-week follow-up of scrotal edema.    He reports a small amount of improvement in his scrotal edema, attributing this to the use of Lasix.      He has been experiencing skin peeling in the affected area, which he attributes to friction and subsequent healing attempts. He has devised a method to apply cream to the area using a . He is seeking a more potent medication for immediate relief and is requesting a prescription for such. He also inquires about the possibility of using topical creams for his skin issues. He is on Lasix 40 mg.    He is currently grappling with depression and anxiety, which he believes are exacerbated by his scrotal edema and pain. He describes his condition as severely impacting his quality of life, causing difficulty in standing, sitting, and moving, and resulting in painful skin cracking. 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 He is currently taking Tylenol 500 mg, 4 times daily, for pain management.    He has been unable to schedule a consultation with the bariatric team due to his work commitments on Thursdays. His diet typically consists of a Jan bar in the morning, beef jerky snacks, Slim Jims, and Sprite Zero in the evening, followed by a large meal after work. He has recently made dietary changes, including reducing his evening meal size and incorporating more vegetables. He expresses interest in joining his friends at "[employer] but is unsure if he can participate in simple activities like walking due to his condition.      ROS:  Review of Systems   Constitutional:  Negative for chills, diaphoresis, fatigue and fever.    HENT:  Negative for congestion and sore throat.    Respiratory:  Negative for cough.    Cardiovascular:  Negative for chest pain.   Gastrointestinal:  Negative for abdominal pain, nausea and vomiting.   Genitourinary:  Negative for dysuria.   Musculoskeletal:  Negative for myalgias and neck pain.   Skin:  Positive for rash.   Neurological:  Negative for weakness, numbness and headaches.     See above    Current Outpatient Medications:     carvedilol (COREG) 25 MG tablet, Take 1 tablet by mouth 2 (Two) Times a Day With Meals., Disp: 180 tablet, Rfl: 1    furosemide (Lasix) 40 MG tablet, Take 1 tablet by mouth Daily As Needed (scrotal edema)., Disp: 30 tablet, Rfl: 2    losartan (COZAAR) 50 MG tablet, Take 1 tablet by mouth Daily., Disp: 90 tablet, Rfl: 1    [START ON 4/1/2025] Tirzepatide-Weight Management (ZEPBOUND) 10 MG/0.5ML solution auto-injector, Inject 0.5 mL under the skin into the appropriate area as directed 1 (One) Time Per Week., Disp: 2 mL, Rfl: 1    Tirzepatide-Weight Management (ZEPBOUND) 5 MG/0.5ML solution auto-injector, Inject 0.5 mL under the skin into the appropriate area as directed 1 (One) Time Per Week for 28 days., Disp: 2 mL, Rfl: 0    [START ON 3/4/2025] Tirzepatide-Weight Management (ZEPBOUND) 7.5 MG/0.5ML solution auto-injector, Inject 0.5 mL under the skin into the appropriate area as directed 1 (One) Time Per Week for 28 days., Disp: 2 mL, Rfl: 0    Lab Results   Component Value Date    GLUCOSE 107 (H) 01/28/2025    BUN 12 01/28/2025    CREATININE 0.63 (L) 01/28/2025     01/28/2025    K 3.9 01/28/2025     01/28/2025    CALCIUM 9.5 01/28/2025    PROTEINTOT 8.1 02/11/2024    ALBUMIN 3.5 02/11/2024    ALT 45 (H) 02/11/2024    AST 50 (H) 02/11/2024    ALKPHOS 101 02/11/2024    BILITOT 0.8 02/11/2024    GLOB 4.6 02/11/2024    AGRATIO 0.8 02/11/2024    BCR 19.0 01/28/2025    ANIONGAP 10.0 01/28/2025    EGFR 118.8 01/28/2025       WBC   Date Value Ref Range Status   11/05/2024  10.83 (H) 3.40 - 10.80 10*3/mm3 Final   07/31/2023 10.25 3.40 - 10.80 10*3/mm3 Final     RBC   Date Value Ref Range Status   11/05/2024 5.83 (H) 4.14 - 5.80 10*6/mm3 Final   07/31/2023 5.69 4.14 - 5.80 10*6/mm3 Final     Hemoglobin   Date Value Ref Range Status   11/05/2024 16.0 13.0 - 17.7 g/dL Final     Hematocrit   Date Value Ref Range Status   11/05/2024 51.0 37.5 - 51.0 % Final     MCV   Date Value Ref Range Status   11/05/2024 87.5 79.0 - 97.0 fL Final     MCH   Date Value Ref Range Status   11/05/2024 27.4 26.6 - 33.0 pg Final     MCHC   Date Value Ref Range Status   11/05/2024 31.4 (L) 31.5 - 35.7 g/dL Final     RDW   Date Value Ref Range Status   11/05/2024 14.4 12.3 - 15.4 % Final     RDW-SD   Date Value Ref Range Status   11/05/2024 45.9 37.0 - 54.0 fl Final     MPV   Date Value Ref Range Status   11/05/2024 10.1 6.0 - 12.0 fL Final     Platelets   Date Value Ref Range Status   11/05/2024 259 140 - 450 10*3/mm3 Final     Neutrophil %   Date Value Ref Range Status   11/05/2024 59.4 42.7 - 76.0 % Final     Lymphocyte %   Date Value Ref Range Status   11/05/2024 28.1 19.6 - 45.3 % Final     Monocyte %   Date Value Ref Range Status   11/05/2024 8.9 5.0 - 12.0 % Final     Eosinophil %   Date Value Ref Range Status   11/05/2024 2.6 0.3 - 6.2 % Final     Basophil %   Date Value Ref Range Status   11/05/2024 0.6 0.0 - 1.5 % Final     Immature Grans %   Date Value Ref Range Status   11/05/2024 0.4 0.0 - 0.5 % Final     Neutrophils, Absolute   Date Value Ref Range Status   11/05/2024 6.45 1.70 - 7.00 10*3/mm3 Final     Lymphocytes, Absolute   Date Value Ref Range Status   11/05/2024 3.04 0.70 - 3.10 10*3/mm3 Final     Monocytes, Absolute   Date Value Ref Range Status   11/05/2024 0.96 (H) 0.10 - 0.90 10*3/mm3 Final     Eosinophils, Absolute   Date Value Ref Range Status   11/05/2024 0.28 0.00 - 0.40 10*3/mm3 Final     Basophils, Absolute   Date Value Ref Range Status   11/05/2024 0.06 0.00 - 0.20 10*3/mm3 Final  "    Immature Grans, Absolute   Date Value Ref Range Status   11/05/2024 0.04 0.00 - 0.05 10*3/mm3 Final     nRBC   Date Value Ref Range Status   11/05/2024 0.0 0.0 - 0.2 /100 WBC Final         OBJECTIVE:  Visit Vitals  /92 (BP Location: Left arm, Patient Position: Sitting, Cuff Size: Adult)   Pulse 104   Temp 98.1 °F (36.7 °C) (Temporal)   Ht 195.6 cm (77.01\")   Wt (!) 202 kg (444 lb 9.6 oz)   SpO2 95%   BMI 52.71 kg/m²      Physical Exam  Vitals and nursing note reviewed.   Constitutional:       General: He is not in acute distress.     Appearance: He is well-developed. He is obese. He is not ill-appearing or toxic-appearing.      Comments: Pleasant   HENT:      Head: Normocephalic and atraumatic.   Eyes:      Pupils: Pupils are equal, round, and reactive to light.   Neck:      Thyroid: No thyromegaly.      Trachea: Phonation normal.   Pulmonary:      Effort: No respiratory distress.   Skin:     Coloration: Skin is not pale.      Findings: No erythema.   Neurological:      Mental Status: He is alert.   Psychiatric:         Behavior: Behavior normal.         Thought Content: Thought content normal.         Judgment: Judgment normal.         Results  Laboratory Studies  A1c was 6.3%.    Imaging  Ultrasound of the scrotum earlier today showed no issues with the testicles, but he did have significant edema, but no infection, abscess, or fluid collection that needed to be drained.    Assessment/Plan      Diagnoses and all orders for this visit:    1. Scrotal edema (Primary)  -     furosemide (Lasix) 40 MG tablet; Take 1 tablet by mouth Daily As Needed (scrotal edema).  Dispense: 30 tablet; Refill: 2  -     Basic metabolic panel; Future    2. Prediabetes    3. Morbid obesity due to excess calories  -     Tirzepatide-Weight Management (ZEPBOUND) 5 MG/0.5ML solution auto-injector; Inject 0.5 mL under the skin into the appropriate area as directed 1 (One) Time Per Week for 28 days.  Dispense: 2 mL; Refill: 0  -     " Tirzepatide-Weight Management (ZEPBOUND) 7.5 MG/0.5ML solution auto-injector; Inject 0.5 mL under the skin into the appropriate area as directed 1 (One) Time Per Week for 28 days.  Dispense: 2 mL; Refill: 0  -     Tirzepatide-Weight Management (ZEPBOUND) 10 MG/0.5ML solution auto-injector; Inject 0.5 mL under the skin into the appropriate area as directed 1 (One) Time Per Week.  Dispense: 2 mL; Refill: 1      Assessment & Plan  1. Scrotal edema.  The ultrasound results revealed no abnormalities in the testicles, but significant edema was observed. There was no evidence of infection, abscess, or fluid accumulation necessitating drainage. The patient's weight last week was 423 pounds which is likely an error given his weight of 444 pounds today. He is advised to maintain a minimum fluid intake of 64 ounces, preferably 80 ounces, given his current Lasix dosage. The continuation of Lasix 40 mg is recommended to help alleviate the swelling. He is encouraged to elevate the affected area as much as possible. A referral to [facility] is suggested for further evaluation. Weight loss is emphasized as a crucial factor in managing the swelling. He is advised to consult with the urology and bariatric surgery departments. A prescription for Zepbound will be provided to aid in weight loss. He is advised to consume plain water instead of sweet tea or flavored water.     Repeat BMP in 1 month.    A goal of losing 5 pounds per month is set, with a target weight of 430 pounds from 445 pounds in 3 months. He will continue Lasix 40 mg. A prescription for Zepbound will be provided to aid in weight loss. The initial dose will be 5 mg for 4 weeks, followed by 7.5 mg for 4 weeks, and finally 10 mg for 4 weeks. He is advised to continue taking Tylenol 500 mg, 4 times daily, for pain management. The use of anti-inflammatory medications like ibuprofen is discouraged due to potential kidney damage when combined with  Lasix.    Prediabetes.  His A1c was high at 6.3%, indicating prediabetes. Diet and exercise are recommended to help manage and potentially resolve this condition.    Follow-up  The patient will follow up in 3 months.    A total of 40 minutes was spent on the day of the visit.    Return in about 3 months (around 5/4/2025) for Annual physical.      RAFAEL Sawyer MD  14:09 CST  2/4/2025   Electronically signed    Patient or patient representative verbalized consent for the use of Ambient Listening during the visit with  BROOKE Sawyer MD for chart documentation. 2/4/2025  15:04 CST

## 2025-02-07 DIAGNOSIS — N50.82 SCROTUM PAIN: ICD-10-CM

## 2025-02-07 DIAGNOSIS — S30.813A ABRASION OF SCROTUM, INITIAL ENCOUNTER: ICD-10-CM

## 2025-02-07 DIAGNOSIS — N50.89 SCROTAL EDEMA: Primary | ICD-10-CM

## 2025-02-07 NOTE — PROGRESS NOTES
Subjective    Mr. Mccarthy is 45 y.o. male    Chief Complaint: Scrotal Edema    History of Present Illness  Patient with previous history of scrotal edema I last saw him 07/31/2023.  Patient states the last 3 to 4 weeks has had increasing swelling and enlargement of the scrotum it is uncomfortable difficulty to walk and he is has some splitting and scrotal skin.  He did state that it does seem to improve slightly when he is able to sit and rest.  He is on 40 mg of Lasix and in the past diuresing is helped with his scrotal edema.  He was recently in the emergency department for SVT but was not seen for the scrotal swelling.  Scrotal ultrasound shows findings consistent with scrotal edema does not appear to be hydrocele or abscess.  Patient appear to be short of breath.  He is on meloxicam also.      The following portions of the patient's history were reviewed and updated as appropriate: allergies, current medications, past family history, past medical history, past social history, past surgical history and problem list.    Review of Systems   Genitourinary:  Positive for scrotal swelling. Negative for difficulty urinating.         Current Outpatient Medications:     carvedilol (COREG) 25 MG tablet, Take 1 tablet by mouth 2 (Two) Times a Day With Meals., Disp: 180 tablet, Rfl: 1    furosemide (Lasix) 40 MG tablet, Take 1 tablet by mouth Daily As Needed (scrotal edema)., Disp: 30 tablet, Rfl: 2    hydrocortisone-bacitracin-zinc oxide-nystatin (MAGIC BARRIER), Apply 1 Application topically to the appropriate area as directed 2 (Two) Times a Day As Needed (rash)., Disp: 400 g, Rfl: 5    losartan (COZAAR) 50 MG tablet, Take 1 tablet by mouth Daily., Disp: 90 tablet, Rfl: 1    [START ON 4/1/2025] Tirzepatide-Weight Management (ZEPBOUND) 10 MG/0.5ML solution auto-injector, Inject 0.5 mL under the skin into the appropriate area as directed 1 (One) Time Per Week., Disp: 2 mL, Rfl: 1    Tirzepatide-Weight Management (ZEPBOUND)  "5 MG/0.5ML solution auto-injector, Inject 0.5 mL under the skin into the appropriate area as directed 1 (One) Time Per Week for 28 days., Disp: 2 mL, Rfl: 0    [START ON 3/4/2025] Tirzepatide-Weight Management (ZEPBOUND) 7.5 MG/0.5ML solution auto-injector, Inject 0.5 mL under the skin into the appropriate area as directed 1 (One) Time Per Week for 28 days., Disp: 2 mL, Rfl: 0    Past Medical History:   Diagnosis Date    Anxiety     Diverticulitis of colon     Diverticulosis     Hypertension     Non morbid obesity due to excess calories 05/16/2017    SVT (supraventricular tachycardia)        Past Surgical History:   Procedure Laterality Date    COLONOSCOPY      HERNIA REPAIR      INGUINAL HERNIA REPAIR         Social History     Socioeconomic History    Marital status:    Tobacco Use    Smoking status: Never     Passive exposure: Never    Smokeless tobacco: Never   Vaping Use    Vaping status: Never Used   Substance and Sexual Activity    Alcohol use: Yes     Alcohol/week: 7.0 standard drinks of alcohol     Types: 7 Cans of beer per week     Comment: social    Drug use: No    Sexual activity: Not Currently     Partners: Female     Birth control/protection: Condom       Family History   Problem Relation Age of Onset    Diabetes Mother     Hypertension Mother     Stroke Mother     Hypertension Father     No Known Problems Sister     No Known Problems Sister     No Known Problems Maternal Grandmother     No Known Problems Maternal Grandfather     No Known Problems Paternal Grandmother     No Known Problems Paternal Grandfather        Objective    Temp 98.1 °F (36.7 °C) (Infrared)   Ht 195.6 cm (77\")   Wt (!) 202 kg (445 lb)   BMI 52.77 kg/m²     Physical Exam  Constitutional:       Appearance: He is obese.   Pulmonary:      Effort: Pulmonary effort is normal.   Genitourinary:     Comments: Massive scrotal edema buried penis.  No drainage or pus noted.  Neurological:      Mental Status: He is alert. "   Psychiatric:         Mood and Affect: Mood normal.             Assessment and Plan    Diagnoses and all orders for this visit:    1. Scrotal pain (Primary)  -     POC Urinalysis Dipstick, Multipro    2. Scrotal edema      I examined patient he has massive scrotal edema this is similar swelling I had seen previously.  I explained that this is fluids accumulates in the scrotum it is fluid from other parts of the body particularly of the abdomen that drains into the scrotal contents.  I explained this is not a primary urologic problem there is no surgery or treatment that we can do from a urologic standpoint that we will approve this it is improved with elevation of the scrotum scrotal support and diuresing.  Patient denies any history of congestive heart failure.  I feel weight loss would help.    I am going to message Dr. Sawyer to see whether he would consider increasing his Lasix.  This would be the only treatment option for him.

## 2025-02-08 ENCOUNTER — HOSPITAL ENCOUNTER (EMERGENCY)
Facility: HOSPITAL | Age: 45
Discharge: HOME | End: 2025-02-08
Attending: FAMILY MEDICINE
Payer: COMMERCIAL

## 2025-02-08 VITALS
BODY MASS INDEX: 37.19 KG/M2 | DIASTOLIC BLOOD PRESSURE: 98 MMHG | SYSTOLIC BLOOD PRESSURE: 160 MMHG | HEIGHT: 77 IN | TEMPERATURE: 98.4 F | OXYGEN SATURATION: 96 % | HEART RATE: 96 BPM | WEIGHT: 315 LBS | RESPIRATION RATE: 18 BRPM

## 2025-02-08 DIAGNOSIS — I47.10 SVT (SUPRAVENTRICULAR TACHYCARDIA): Primary | ICD-10-CM

## 2025-02-08 PROCEDURE — 93005 ELECTROCARDIOGRAM TRACING: CPT

## 2025-02-08 PROCEDURE — 99283 EMERGENCY DEPT VISIT LOW MDM: CPT

## 2025-02-08 PROCEDURE — 25810000003 SODIUM CHLORIDE 0.9 % SOLUTION: Performed by: STUDENT IN AN ORGANIZED HEALTH CARE EDUCATION/TRAINING PROGRAM

## 2025-02-08 PROCEDURE — 96374 THER/PROPH/DIAG INJ IV PUSH: CPT

## 2025-02-08 RX ORDER — DILTIAZEM HYDROCHLORIDE 5 MG/ML
INJECTION INTRAVENOUS
Status: COMPLETED
Start: 2025-02-08 | End: 2025-02-08

## 2025-02-08 RX ORDER — ADENOSINE 3 MG/ML
12 INJECTION, SOLUTION INTRAVENOUS ONCE
Status: DISCONTINUED | OUTPATIENT
Start: 2025-02-08 | End: 2025-02-08 | Stop reason: ALTCHOICE

## 2025-02-08 RX ORDER — DILTIAZEM HYDROCHLORIDE 5 MG/ML
20 INJECTION INTRAVENOUS ONCE
Status: COMPLETED | OUTPATIENT
Start: 2025-02-08 | End: 2025-02-08

## 2025-02-08 RX ADMIN — SODIUM CHLORIDE 1000 ML: 9 INJECTION, SOLUTION INTRAVENOUS at 16:04

## 2025-02-08 RX ADMIN — DILTIAZEM HYDROCHLORIDE 20 MG: 5 INJECTION INTRAVENOUS at 16:00

## 2025-02-08 RX ADMIN — DILTIAZEM HYDROCHLORIDE 20 MG: 5 INJECTION, SOLUTION INTRAVENOUS at 16:00

## 2025-02-09 NOTE — ED PROVIDER NOTES
Subjective   History of Present Illness  45-year-old male with past medical history of SVT, presents emergency department in [facility].  Upon arrival he denies any pain associate with his symptoms but states that he is mildly short of breath.  He has been in SVT multiple times in the past with his been treated with both adenosine and diltiazem.  He denies any fevers, chills, nausea, vomiting, chest pain.        Review of Systems   All other systems reviewed and are negative.      Past Medical History:   Diagnosis Date    Anxiety     Diverticulitis of colon     Diverticulosis     Hypertension     Non morbid obesity due to excess calories 05/16/2017    SVT (supraventricular tachycardia)        Allergies   Allergen Reactions    Lisinopril Cough       Past Surgical History:   Procedure Laterality Date    COLONOSCOPY      HERNIA REPAIR      INGUINAL HERNIA REPAIR         Family History   Problem Relation Age of Onset    Diabetes Mother     Hypertension Mother     Stroke Mother     Hypertension Father     No Known Problems Sister     No Known Problems Sister     No Known Problems Maternal Grandmother     No Known Problems Maternal Grandfather     No Known Problems Paternal Grandmother     No Known Problems Paternal Grandfather        Social History     Socioeconomic History    Marital status:    Tobacco Use    Smoking status: Never     Passive exposure: Never    Smokeless tobacco: Never   Vaping Use    Vaping status: Never Used   Substance and Sexual Activity    Alcohol use: Yes     Alcohol/week: 7.0 standard drinks of alcohol     Types: 7 Cans of beer per week     Comment: social    Drug use: No    Sexual activity: Not Currently     Partners: Female     Birth control/protection: Condom           Objective   Physical Exam  Constitutional:       Appearance: Normal appearance.   HENT:      Head: Normocephalic.      Mouth/Throat:      Mouth: Mucous membranes are moist.   Eyes:      Pupils: Pupils are equal, round, and  reactive to light.   Cardiovascular:      Rate and Rhythm: Regular rhythm. Tachycardia present.   Pulmonary:      Effort: Pulmonary effort is normal.   Abdominal:      Palpations: Abdomen is soft.   Musculoskeletal:         General: Normal range of motion.      Cervical back: Normal range of motion.   Neurological:      General: No focal deficit present.      Mental Status: He is alert.   Psychiatric:         Mood and Affect: Mood normal.         Procedures           ED Course  ED Course as of 02/08/25 2048   Sat Feb 08, 2025   1626 Initial EKG at 1556  Independently interpreted by me  Rate 190  SVT  QRS duration 86  QTc 441  No STEMI    Electronically signed by Saulo Vick DO, 02/08/25, 4:27 PM CST.   [OI]   1627 Repeat EKG at 1603 independently interpreted by me  Rate 105  FL interval 174  QTc 454  No STEMI    Electronically signed by Saulo Vick DO, 02/08/25, 4:27 PM CST.   [OI]      ED Course User Index  [OI] Saulo Vick DO                                                       Medical Decision Making  45-year-old male with past medical history of SVT, presents emergency department in SVT.  Upon arrival he denies any pain associate with his symptoms but states that he is mildly short of breath.  He has been in SVT multiple times in the past with his been treated with both adenosine and diltiazem.  He denies any fevers, chills, nausea, vomiting, chest pain.  Vital signs significant for tachycardia upon arrival.  Patient was initially hypotensive with a blood pressure in the 90s systolic.  Repeat blood pressure was not appropriate range therefore he was given 20 of diltiazem.  His heart rate significantly decreased to an appropriate level.  He was monitored in the emergency department for over an hour.  His heart rate remained within normal limits.  Due to patient's repeat episodes of SVT, advised him to follow-up with cardiology to potentially have an ablation performed outpatient.  Upon reexamination patient  in no acute distress, nontoxic-appearing, stable condition.  Patient understands and agrees with our plan for discharge.    Electronically signed by Saulo Vick DO, 02/08/25, 8:50 PM CST.      Problems Addressed:  SVT (supraventricular tachycardia): complicated acute illness or injury    Risk  Prescription drug management.        Final diagnoses:   SVT (supraventricular tachycardia)       ED Disposition  ED Disposition       ED Disposition   Discharge    Condition   Stable    Comment   --               BROOKE Sawyer MD  [address] [address]  [address] [place] [address]  [phone]    In 2 days           Medication List      No changes were made to your prescriptions during this visit.            Saulo Vick DO  02/08/25 3054

## 2025-02-10 LAB
QT INTERVAL: 248 MS
QT INTERVAL: 344 MS
QTC INTERVAL: 441 MS
QTC INTERVAL: 454 MS

## 2025-02-12 ENCOUNTER — OFFICE VISIT (OUTPATIENT)
Dept: UROLOGY | Facility: CLINIC | Age: 45
End: 2025-02-12
Payer: COMMERCIAL

## 2025-02-12 ENCOUNTER — OFFICE VISIT (OUTPATIENT)
Dept: WOUND CARE | Facility: HOSPITAL | Age: 45
End: 2025-02-12
Payer: COMMERCIAL

## 2025-02-12 VITALS — WEIGHT: 315 LBS | HEIGHT: 77 IN | TEMPERATURE: 98.1 F | BODY MASS INDEX: 37.19 KG/M2

## 2025-02-12 DIAGNOSIS — N50.89 SCROTAL EDEMA: ICD-10-CM

## 2025-02-12 DIAGNOSIS — N50.82 SCROTAL PAIN: Primary | ICD-10-CM

## 2025-02-12 PROCEDURE — G0463 HOSPITAL OUTPT CLINIC VISIT: HCPCS

## 2025-02-17 ENCOUNTER — TELEPHONE (OUTPATIENT)
Dept: UROLOGY | Facility: CLINIC | Age: 45
End: 2025-02-17
Payer: COMMERCIAL

## 2025-02-17 NOTE — TELEPHONE ENCOUNTER
Caller: Marlon Mccarthy    Relationship: Self    Best call back number: [phone] (home)      What form or medical record are you requesting: [place]    Who is requesting this form or medical record from you: [employer]    How would you like to receive the form or medical records (pick-up, mail, fax): UPLOAD    Timeframe paperwork needed: ASAP     Additional notes: PATIENT CALLED REGARDING [place] PAPERWORK.  HE QUESTIONED THE DATES LISTED ON THE FORM AND HE STATED THAT IT NEEDS TO COVER 02/10, 02/16, AND 02/17/2025.  HE HAS NOT BEEN ABLE TO ACCESS THE FORM ON HIS MYCHART AND HAS BEEN TRYING TO GET ASSISTANCE.  CAN YOU PLEASE ADVISE HOW ELSE HE CAN POSSIBLY GET THE PAPERWORK SO HE CAN UPLOAD TO HIS EMPLOYER.  IT CANNOT BE FAXED OR EMAILED.  WANTS TO KNOW IF WORST CASE HE COULD HAVE SOMEONE COME TO THE OFFICE TO  A HARD COPY FOR HIM AS HE IS NOT ABLE TO DRIVE AND  NEEDS IT SENT TODAY.  PLEASE CALL TO ADVISE. THANK  YOU.

## 2025-05-01 ENCOUNTER — APPOINTMENT (OUTPATIENT)
Dept: GENERAL RADIOLOGY | Facility: HOSPITAL | Age: 45
End: 2025-05-01
Payer: COMMERCIAL

## 2025-05-01 ENCOUNTER — TELEMEDICINE (OUTPATIENT)
Dept: FAMILY MEDICINE CLINIC | Facility: TELEHEALTH | Age: 45
End: 2025-05-01
Payer: COMMERCIAL

## 2025-05-01 ENCOUNTER — HOSPITAL ENCOUNTER (EMERGENCY)
Facility: HOSPITAL | Age: 45
Discharge: HOME | End: 2025-05-01
Attending: EMERGENCY MEDICINE
Payer: COMMERCIAL

## 2025-05-01 VITALS
SYSTOLIC BLOOD PRESSURE: 158 MMHG | DIASTOLIC BLOOD PRESSURE: 110 MMHG | OXYGEN SATURATION: 94 % | WEIGHT: 315 LBS | BODY MASS INDEX: 37.19 KG/M2 | HEART RATE: 97 BPM | RESPIRATION RATE: 14 BRPM | TEMPERATURE: 98.5 F | HEIGHT: 77 IN

## 2025-05-01 DIAGNOSIS — J01.90 ACUTE NON-RECURRENT SINUSITIS, UNSPECIFIED LOCATION: Primary | ICD-10-CM

## 2025-05-01 DIAGNOSIS — I47.10 PAROXYSMAL SUPRAVENTRICULAR TACHYCARDIA: Primary | ICD-10-CM

## 2025-05-01 DIAGNOSIS — R03.0 ELEVATED BLOOD PRESSURE READING: ICD-10-CM

## 2025-05-01 LAB
ALBUMIN SERPL-MCNC: 3.7 G/DL (ref 3.5–5.2)
ALBUMIN/GLOB SERPL: 0.8 G/DL
ALP SERPL-CCNC: 99 U/L (ref 39–117)
ALT SERPL W P-5'-P-CCNC: 32 U/L (ref 1–41)
ANION GAP SERPL CALCULATED.3IONS-SCNC: 14 MMOL/L (ref 5–15)
AST SERPL-CCNC: 41 U/L (ref 1–40)
BASOPHILS # BLD AUTO: 0.08 10*3/MM3 (ref 0–0.2)
BASOPHILS NFR BLD AUTO: 0.8 % (ref 0–1.5)
BILIRUB SERPL-MCNC: 0.7 MG/DL (ref 0–1.2)
BUN SERPL-MCNC: 7 MG/DL (ref 6–20)
BUN/CREAT SERPL: 11.5 (ref 7–25)
CALCIUM SPEC-SCNC: 8.7 MG/DL (ref 8.6–10.5)
CHLORIDE SERPL-SCNC: 101 MMOL/L (ref 98–107)
CO2 SERPL-SCNC: 24 MMOL/L (ref 22–29)
CREAT SERPL-MCNC: 0.61 MG/DL (ref 0.76–1.27)
D DIMER PPP FEU-MCNC: 0.63 MCGFEU/ML (ref 0–0.5)
DEPRECATED RDW RBC AUTO: 46.6 FL (ref 37–54)
EGFRCR SERPLBLD CKD-EPI 2021: 120 ML/MIN/1.73
EOSINOPHIL # BLD AUTO: 0.39 10*3/MM3 (ref 0–0.4)
EOSINOPHIL NFR BLD AUTO: 3.7 % (ref 0.3–6.2)
ERYTHROCYTE [DISTWIDTH] IN BLOOD BY AUTOMATED COUNT: 14.6 % (ref 12.3–15.4)
GLOBULIN UR ELPH-MCNC: 4.6 GM/DL
GLUCOSE SERPL-MCNC: 99 MG/DL (ref 65–99)
HCT VFR BLD AUTO: 47.8 % (ref 37.5–51)
HGB BLD-MCNC: 14.9 G/DL (ref 13–17.7)
IMM GRANULOCYTES # BLD AUTO: 0.05 10*3/MM3 (ref 0–0.05)
IMM GRANULOCYTES NFR BLD AUTO: 0.5 % (ref 0–0.5)
LYMPHOCYTES # BLD AUTO: 2.11 10*3/MM3 (ref 0.7–3.1)
LYMPHOCYTES NFR BLD AUTO: 20.3 % (ref 19.6–45.3)
MAGNESIUM SERPL-MCNC: 2 MG/DL (ref 1.6–2.6)
MCH RBC QN AUTO: 27.2 PG (ref 26.6–33)
MCHC RBC AUTO-ENTMCNC: 31.2 G/DL (ref 31.5–35.7)
MCV RBC AUTO: 87.4 FL (ref 79–97)
MONOCYTES # BLD AUTO: 0.66 10*3/MM3 (ref 0.1–0.9)
MONOCYTES NFR BLD AUTO: 6.3 % (ref 5–12)
NEUTROPHILS NFR BLD AUTO: 68.4 % (ref 42.7–76)
NEUTROPHILS NFR BLD AUTO: 7.12 10*3/MM3 (ref 1.7–7)
NRBC BLD AUTO-RTO: 0 /100 WBC (ref 0–0.2)
NT-PROBNP SERPL-MCNC: 66.1 PG/ML (ref 0–450)
PLATELET # BLD AUTO: 242 10*3/MM3 (ref 140–450)
PMV BLD AUTO: 10.4 FL (ref 6–12)
POTASSIUM SERPL-SCNC: 3.9 MMOL/L (ref 3.5–5.2)
PROT SERPL-MCNC: 8.3 G/DL (ref 6–8.5)
RBC # BLD AUTO: 5.47 10*6/MM3 (ref 4.14–5.8)
SODIUM SERPL-SCNC: 139 MMOL/L (ref 136–145)
WBC NRBC COR # BLD AUTO: 10.41 10*3/MM3 (ref 3.4–10.8)

## 2025-05-01 PROCEDURE — 80053 COMPREHEN METABOLIC PANEL: CPT | Performed by: EMERGENCY MEDICINE

## 2025-05-01 PROCEDURE — 99283 EMERGENCY DEPT VISIT LOW MDM: CPT | Performed by: EMERGENCY MEDICINE

## 2025-05-01 PROCEDURE — 96374 THER/PROPH/DIAG INJ IV PUSH: CPT

## 2025-05-01 PROCEDURE — 93005 ELECTROCARDIOGRAM TRACING: CPT | Performed by: EMERGENCY MEDICINE

## 2025-05-01 PROCEDURE — 85025 COMPLETE CBC W/AUTO DIFF WBC: CPT | Performed by: EMERGENCY MEDICINE

## 2025-05-01 PROCEDURE — 93010 ELECTROCARDIOGRAM REPORT: CPT | Performed by: INTERNAL MEDICINE

## 2025-05-01 PROCEDURE — 93005 ELECTROCARDIOGRAM TRACING: CPT

## 2025-05-01 PROCEDURE — 25010000002 LABETALOL 5 MG/ML SOLUTION: Performed by: EMERGENCY MEDICINE

## 2025-05-01 PROCEDURE — 83735 ASSAY OF MAGNESIUM: CPT | Performed by: EMERGENCY MEDICINE

## 2025-05-01 PROCEDURE — 83880 ASSAY OF NATRIURETIC PEPTIDE: CPT | Performed by: EMERGENCY MEDICINE

## 2025-05-01 PROCEDURE — 85379 FIBRIN DEGRADATION QUANT: CPT | Performed by: EMERGENCY MEDICINE

## 2025-05-01 PROCEDURE — 25010000002 ADENOSINE PER 6 MG

## 2025-05-01 RX ORDER — LABETALOL HYDROCHLORIDE 5 MG/ML
20 INJECTION, SOLUTION INTRAVENOUS ONCE
Status: COMPLETED | OUTPATIENT
Start: 2025-05-01 | End: 2025-05-01

## 2025-05-01 RX ORDER — ADENOSINE 3 MG/ML
6 INJECTION, SOLUTION INTRAVENOUS ONCE
Status: DISCONTINUED | OUTPATIENT
Start: 2025-05-01 | End: 2025-05-01 | Stop reason: HOSPADM

## 2025-05-01 RX ORDER — PREDNISONE 20 MG/1
20 TABLET ORAL 2 TIMES DAILY
Qty: 10 TABLET | Refills: 0 | Status: SHIPPED | OUTPATIENT
Start: 2025-05-01 | End: 2025-05-06

## 2025-05-01 RX ORDER — DEXTROMETHORPHAN HYDROBROMIDE AND PROMETHAZINE HYDROCHLORIDE 15; 6.25 MG/5ML; MG/5ML
5 SYRUP ORAL 4 TIMES DAILY PRN
Qty: 118 ML | Refills: 0 | Status: SHIPPED | OUTPATIENT
Start: 2025-05-01

## 2025-05-01 RX ORDER — DILTIAZEM HCL/D5W 125 MG/125
5-15 PLASTIC BAG, INJECTION (ML) INTRAVENOUS
Status: DISCONTINUED | OUTPATIENT
Start: 2025-05-01 | End: 2025-05-01 | Stop reason: HOSPADM

## 2025-05-01 RX ORDER — FLUTICASONE PROPIONATE 50 MCG
2 SPRAY, SUSPENSION (ML) NASAL DAILY
Qty: 16 G | Refills: 0 | Status: SHIPPED | OUTPATIENT
Start: 2025-05-01

## 2025-05-01 RX ORDER — ADENOSINE 3 MG/ML
INJECTION, SOLUTION INTRAVENOUS
Status: DISCONTINUED
Start: 2025-05-01 | End: 2025-05-01 | Stop reason: HOSPADM

## 2025-05-01 RX ADMIN — LABETALOL HYDROCHLORIDE 20 MG: 5 INJECTION INTRAVENOUS at 15:49

## 2025-05-01 NOTE — ED PROVIDER NOTES
Subjective   History of Present Illness  Patient is a 45-year-old morbidly obese gentleman who has got history of paroxysmal supraventricular tachycardias in the past came in the ED with episode and for evaluation has any chest pain or shortness of breath denies any syncope.  In the ED IV access obtained we will going to give him and nursing he wanted know what the complications could be and something else could be used I tried to explain the rate limiting and rate converting medications to him.  If he is not wanting adenosine I can start him on Cardizem during all this discussion he converted spontaneously back to normal sinus rhythm.    Palpitations  Palpitations quality:  Regular  Onset quality:  Sudden  Timing:  Constant  Chronicity:  Recurrent  Context: anxiety    Context: not appetite suppressants, not blood loss, not caffeine, not dehydration, not exercise, not nicotine and not stimulant use    Relieved by:  Nothing  Worsened by:  Nothing  Ineffective treatments:  None tried  Associated symptoms: no back pain, no chest pain, no chest pressure, no cough, no diaphoresis, no dizziness, no leg pain, no lower extremity edema, no malaise/fatigue, no nausea, no near-syncope, no numbness, no orthopnea, no PND, no syncope, no vomiting and no weakness    Risk factors: no diabetes mellitus, no heart disease, no hx of atrial fibrillation, no hx of PE and no hx of thyroid disease        Review of Systems   Constitutional: Negative.  Negative for diaphoresis and malaise/fatigue.   HENT: Negative.     Respiratory:  Negative for cough.    Cardiovascular:  Positive for palpitations. Negative for chest pain, orthopnea, syncope, PND and near-syncope.   Gastrointestinal: Negative.  Negative for abdominal distention, abdominal pain, nausea and vomiting.   Endocrine: Negative.    Genitourinary: Negative.    Musculoskeletal: Negative.  Negative for back pain and neck pain.   Skin:  Negative for color change and pallor.    Neurological: Negative.  Negative for dizziness, syncope, weakness, light-headedness, numbness and headaches.   Hematological: Negative.  Does not bruise/bleed easily.   All other systems reviewed and are negative.      Past Medical History:   Diagnosis Date    Anxiety     Diverticulitis of colon     Diverticulosis     Hypertension     Non morbid obesity due to excess calories 05/16/2017    SVT (supraventricular tachycardia)        Allergies   Allergen Reactions    Lisinopril Cough       Past Surgical History:   Procedure Laterality Date    COLONOSCOPY      HERNIA REPAIR      INGUINAL HERNIA REPAIR         Family History   Problem Relation Age of Onset    Diabetes Mother     Hypertension Mother     Stroke Mother     Hypertension Father     No Known Problems Sister     No Known Problems Sister     No Known Problems Maternal Grandmother     No Known Problems Maternal Grandfather     No Known Problems Paternal Grandmother     No Known Problems Paternal Grandfather        Social History     Socioeconomic History    Marital status:    Tobacco Use    Smoking status: Never     Passive exposure: Never    Smokeless tobacco: Never   Vaping Use    Vaping status: Never Used   Substance and Sexual Activity    Alcohol use: Yes     Alcohol/week: 7.0 standard drinks of alcohol     Types: 7 Cans of beer per week     Comment: social    Drug use: No    Sexual activity: Not Currently     Partners: Female     Birth control/protection: Condom           Objective   Physical Exam  Vitals and nursing note reviewed. Exam conducted with a chaperone present.   Constitutional:       General: He is not in acute distress.     Appearance: Normal appearance. He is well-developed. He is morbidly obese. He is not toxic-appearing.   HENT:      Head: Normocephalic and atraumatic.      Nose: Nose normal.      Mouth/Throat:      Mouth: Mucous membranes are moist.      Pharynx: Uvula midline.   Eyes:      General: Lids are normal. Lids are  everted, no foreign bodies appreciated.      Conjunctiva/sclera: Conjunctivae normal.      Pupils: Pupils are equal, round, and reactive to light.   Neck:      Vascular: Normal carotid pulses. No carotid bruit or JVD.      Trachea: Trachea and phonation normal. No tracheal deviation.   Cardiovascular:      Rate and Rhythm: Regular rhythm. Tachycardia present.      Chest Wall: PMI is not displaced.      Pulses: Normal pulses.      Heart sounds: Normal heart sounds.      No gallop.      Comments: Chronic venous changes lower extremities morbidly obese patient.  No calf tenderness.  Pulmonary:      Effort: Pulmonary effort is normal. No tachypnea, accessory muscle usage or respiratory distress.      Breath sounds: Normal breath sounds. No stridor. No decreased breath sounds, wheezing, rhonchi or rales.   Abdominal:      General: Bowel sounds are normal. There is no distension.      Palpations: Abdomen is soft.      Tenderness: There is no abdominal tenderness.   Musculoskeletal:         General: No swelling. Normal range of motion.      Cervical back: Full passive range of motion without pain, normal range of motion and neck supple. No rigidity.      Right lower le+ Edema present.      Left lower le+ Edema present.      Comments: Lower extremity exam bilaterally is unremarkable.  There is no right or left calf tenderness .  There is no palpable venous cord.  No obvious difference in the size of the legs.  No pitting edema.  The dorsalis pedis and posterior tibial femoral and popliteal pulses are palpable and +2 bilaterally.  Homans sign is negative   Skin:     General: Skin is warm and dry.      Capillary Refill: Capillary refill takes less than 2 seconds.      Coloration: Skin is not jaundiced or pale.      Nails: There is no clubbing.   Neurological:      General: No focal deficit present.      Mental Status: He is alert and oriented to person, place, and time.      GCS: GCS eye subscore is 4. GCS verbal  subscore is 5. GCS motor subscore is 6.      Cranial Nerves: No cranial nerve deficit.      Motor: Motor function is intact.      Gait: Gait normal.      Deep Tendon Reflexes: Reflexes are normal and symmetric. Reflexes normal.   Psychiatric:         Speech: Speech normal.         Behavior: Behavior normal.         Procedures           ED Course  ED Course as of 05/01/25 1620   u May 01, 2025   1344 Repeat EKG patient show sinus tachycardia some PVCs evidence of acute ischemia [TS]   1515 Patient states that his blood pressure and heart rate stays up when he is anxious.  His baseline heart rate is 90 when he gets excited or anxious rate goes up to 119. [TS]   1534 Years Algorithm for Pulmonary Embolus  To be used in hemodynamically stable patient >18 years old    No signs of DVT are present, there is no hemoptysis, PE is not the most likely diagnosis and the D-dimer is not greater or equal to 1000 ng/mL. Therefore PE is excluded . The Years algorithm rules out PE (0.43 % with symptomatic VTE during 3-month follow-up) [TS]   1614 Patient's dimer is slightly elevated he has had elevated dimer in the past also.  I have discussed CT angiogram with him the reasoning behind the CT angiogram the pros and cons of that has been explained the patient does not want a CT angiogram to be performed this time. [TS]   1615 Patient blood pressure has improved heart rate is improved we will place him in a Holter monitor will discharge him home to follow-up with his cardiologist Dr. Hector he may need ablation at some later date. [TS]   1617 Blood pressure 147/86 with a heart rate of 98. [TS]   1617 I had a discussion with the patient/ regarding diagnosis, diagnostic results, treatment plan, and medications.  The patient/family indicated understanding of these instructions.  I spent adequate time at the bedside prior to discharge necessary to discuss the aftercare instructions, giving patient education, providing explanations of  the results of our evaluations/findings, and my decision making to assure that the patient/family understand the plan of care.  Time was allotted to answer questions at that time and throughout the ED course.  Emphasis was placed on timely follow-up after discharge.  I also discussed the potential for the development of an acute emergent condition requiring further evaluation, return to the ER, admission, or even surgical intervention. I discussed that we found nothing during the visit today indicating the need for further ER workup at this time, admission to the hospital, or the presence of an acute unstable medical condition.  I encouraged the patient to return to the emergency department immediately for ANY concerns, worsening, new complaints, or if symptoms persist and unable to seek follow-up in a timely fashion.  The patient/ expressed understanding and agreement with this plan.    [TS]      ED Course User Index  [TS] Palmer Watkins MD                                                       Medical Decision Making  Palpitation the pros and cons adenosine have been explained the pros and cons of Cardizem has been explained the patient did not want to use adenosine wanted to try Cardizem.  Meanwhile reverted back to normal sinus rhythm on his own.  Denies any chest pain denies any shortness of breath denies any fever lab workup was initiated and patient was placed on a monitor we were going to cardiovert him chemically with adenosine the patient did not want to use that was very anxious about it therefore Cardizem was ordered but the patient meanwhile spontaneously reverted to normal sinus rhythm we have watched him in the ED and need him blood pressure control medications.  He is doing much better at this time and wants to go home.    Problems Addressed:  Elevated blood pressure reading: complicated acute illness or injury  Paroxysmal supraventricular tachycardia: complicated acute illness or injury    Amount  and/or Complexity of Data Reviewed  Labs: ordered.     Details: As reviewed  Radiology: ordered.  ECG/medicine tests: ordered.     Details: Reviewed patient was in PSVT and now in normal sinus rhythm    Risk  Prescription drug management.  Risk Details: Years Algorithm for Pulmonary Embolus  To be used in hemodynamically stable patient >18 years old    No signs of DVT are present, there is no hemoptysis, PE is not the most likely diagnosis and the D-dimer is not greater or equal to 1000 ng/mL. Therefore PE is excluded . The Years algorithm rules out PE (0.43 % with symptomatic VTE during 3-month follow-up)    Patient came in for rapid heartbeat.  His got history of PSVT there is no chest pain or shortness of breath associate with this.  No fever associate with this.  No history drug use recreational drug use.  Patient has got recurrent episodes this may require to see a ablation physiologist.  Is going to follow-up with his cardiologist.  He wants to go home at this time.        Final diagnoses:   Paroxysmal supraventricular tachycardia   Elevated blood pressure reading       ED Disposition  ED Disposition       ED Disposition   Discharge    Condition   Stable    Comment   --               BROOKE Sawyer MD  [address] [facility] [address]  [address]  [facility] [address]  [phone]    Schedule an appointment as soon as possible for a visit       Teddy Hector MD  [address] [facility]  [address]  [facility] [address]  [phone]    Schedule an appointment as soon as possible for a visit            Medication List      No changes were made to your prescriptions during this visit.            Palmer Watkins MD  05/01/25 1338       Palmer Watkins MD  05/01/25 1619       Palmer Watkins MD  05/01/25 1620

## 2025-05-01 NOTE — DISCHARGE INSTRUCTIONS
It was very nice to meet you, Marlon. Thank you for allowing us to take care of you today at [facility].     Today you were seen in the emergency department for your symptoms. Please understand that an ER evaluation is just the start of your evaluation. We do the best we can, but we are often unable to fully find what is causing your symptoms from one evaluation.  Because of this, the goal is to determine whether you need to be evaluated in the hospital or if it is safe for you to go home and see other doctors provided such as primary care physicians or specialist on an outpatient basis.      Like we discussed, I strongly urge that you follow up with your primary care doctor. Please call their office to set up an appointment as soon as possible so that you can be re-evaluated for improvement in your symptoms or for any other questions.  I have provided the information needed, including phone number, to call to set up an appointment below in these discharge papers.      Educational material has also been provided in the following pages regarding what we have discussed today.  I have called in a Holter monitor for you.     Please return to the emergency room within 12-48 hours if you experience symptoms such as the following:   Fever, chills, chest pain or shortness of breath, pain with inspiration/expiration, pain that travels to your arms, neck or back, nausea, vomiting, severe headache, tearing pain in your chest, dizziness, feel as though you are about to pass out, OR if you have any worsening symptoms, or any other concerns.    R

## 2025-05-01 NOTE — PROGRESS NOTES
Subjective   Chief Complaint   Patient presents with    Sinusitis       Marlon Mccarthy is a 45 y.o. male.     History of Present Illness  Pt reports congestion, cough, rattling in chest and wheezing that started about 1 week. His main complaint is nasal congestion. He feels like his nasal passages are closing up.   Sinusitis  This is a new problem. Episode onset: 1 week. The problem is unchanged. There has been no fever. Associated symptoms include congestion and coughing. Pertinent negatives include no chills, diaphoresis, hoarse voice, neck pain, shortness of breath, sinus pressure, sneezing, sore throat or swollen glands. Treatments tried: claritin, mucinex.        Allergies   Allergen Reactions    Lisinopril Cough       Past Medical History:   Diagnosis Date    Anxiety     Diverticulitis of colon     Diverticulosis     Hypertension     Non morbid obesity due to excess calories 05/16/2017    SVT (supraventricular tachycardia)        Past Surgical History:   Procedure Laterality Date    COLONOSCOPY      HERNIA REPAIR      INGUINAL HERNIA REPAIR         Social History     Socioeconomic History    Marital status:    Tobacco Use    Smoking status: Never     Passive exposure: Never    Smokeless tobacco: Never   Vaping Use    Vaping status: Never Used   Substance and Sexual Activity    Alcohol use: Yes     Alcohol/week: 7.0 standard drinks of alcohol     Types: 7 Cans of beer per week     Comment: social    Drug use: No    Sexual activity: Not Currently     Partners: Female     Birth control/protection: Condom       Family History   Problem Relation Age of Onset    Diabetes Mother     Hypertension Mother     Stroke Mother     Hypertension Father     No Known Problems Sister     No Known Problems Sister     No Known Problems Maternal Grandmother     No Known Problems Maternal Grandfather     No Known Problems Paternal Grandmother     No Known Problems Paternal Grandfather          Current Outpatient  Medications:     carvedilol (COREG) 25 MG tablet, Take 1 tablet by mouth 2 (Two) Times a Day With Meals., Disp: 180 tablet, Rfl: 1    fluticasone (FLONASE) 50 MCG/ACT nasal spray, Administer 2 sprays into the nostril(s) as directed by provider Daily., Disp: 16 g, Rfl: 0    furosemide (Lasix) 40 MG tablet, Take 1 tablet by mouth Daily As Needed (scrotal edema)., Disp: 30 tablet, Rfl: 2    hydrocortisone-bacitracin-zinc oxide-nystatin (MAGIC BARRIER), Apply 1 Application topically to the appropriate area as directed 2 (Two) Times a Day As Needed (rash)., Disp: 400 g, Rfl: 5    losartan (COZAAR) 50 MG tablet, Take 1 tablet by mouth Daily., Disp: 90 tablet, Rfl: 1    predniSONE (DELTASONE) 20 MG tablet, Take 1 tablet by mouth 2 (Two) Times a Day for 5 days., Disp: 10 tablet, Rfl: 0    promethazine-dextromethorphan (PROMETHAZINE-DM) 6.25-15 MG/5ML syrup, Take 5 mL by mouth 4 (Four) Times a Day As Needed for Cough., Disp: 118 mL, Rfl: 0    Tirzepatide-Weight Management (ZEPBOUND) 10 MG/0.5ML solution auto-injector, Inject 0.5 mL under the skin into the appropriate area as directed 1 (One) Time Per Week., Disp: 2 mL, Rfl: 1      Review of Systems   Constitutional:  Negative for chills, diaphoresis, fatigue and fever.   HENT:  Positive for congestion. Negative for hoarse voice, postnasal drip, sinus pressure, sneezing, sore throat, swollen glands and voice change.    Respiratory:  Positive for cough and wheezing. Negative for chest tightness and shortness of breath.    Musculoskeletal:  Negative for neck pain.   Neurological:  Negative for headache.        There were no vitals filed for this visit.    Objective   Physical Exam  Constitutional:       General: He is not in acute distress.     Appearance: Normal appearance. He is not ill-appearing, toxic-appearing or diaphoretic.   HENT:      Head: Normocephalic.      Nose: Congestion present.      Right Sinus: No maxillary sinus tenderness or frontal sinus tenderness.       Left Sinus: No maxillary sinus tenderness or frontal sinus tenderness.      Comments: Per pt       Mouth/Throat:      Lips: Pink.      Mouth: Mucous membranes are moist.   Pulmonary:      Effort: Pulmonary effort is normal.   Neurological:      Mental Status: He is alert and oriented to person, place, and time.          Procedures     Assessment & Plan   Diagnoses and all orders for this visit:    1. Acute non-recurrent sinusitis, unspecified location (Primary)  -     predniSONE (DELTASONE) 20 MG tablet; Take 1 tablet by mouth 2 (Two) Times a Day for 5 days.  Dispense: 10 tablet; Refill: 0  -     fluticasone (FLONASE) 50 MCG/ACT nasal spray; Administer 2 sprays into the nostril(s) as directed by provider Daily.  Dispense: 16 g; Refill: 0  -     promethazine-dextromethorphan (PROMETHAZINE-DM) 6.25-15 MG/5ML syrup; Take 5 mL by mouth 4 (Four) Times a Day As Needed for Cough.  Dispense: 118 mL; Refill: 0            PLAN: Discussed dosing, side effects, recommended other symptomatic care.  Patient should follow up with primary care provider, Urgent Care or ER if symptoms worsen, fail to resolve or other symptoms need attention. Patient/family agree to the above.         KARLA Murcia     Mode of Visit: Video  Location of patient: -HOME-  Location of provider: +HOME+  You have chosen to receive care through a telehealth visit.  The patient has signed the video visit consent form.  The visit included audio and video interaction. No technical issues occurred during this visit.    The use of a video visit has been reviewed with the patient and verbal informed consent has been obtained. Myself and Marlon Mccarthy participated in this visit. The patient is located at [address] [place] [address]. I am located in [place], [place]. [employer] and [employer] were utilized.        This visit was performed via Telehealth.  This patient has been instructed to follow-up with their primary care provider if their symptoms worsen  or the treatment provided does not resolve their illness.

## 2025-05-03 LAB
QT INTERVAL: 276 MS
QT INTERVAL: 362 MS
QTC INTERVAL: 485 MS
QTC INTERVAL: 487 MS

## 2025-05-22 DIAGNOSIS — N50.89 SCROTAL EDEMA: ICD-10-CM

## 2025-05-22 RX ORDER — FUROSEMIDE 40 MG/1
40 TABLET ORAL DAILY PRN
Qty: 30 TABLET | Refills: 2 | Status: SHIPPED | OUTPATIENT
Start: 2025-05-22

## 2025-05-22 NOTE — TELEPHONE ENCOUNTER
Refills are being sent, but I would like him to schedule a visit since he canceled 2 visits in April and they have not been rescheduled.  Thanks

## 2025-05-22 NOTE — TELEPHONE ENCOUNTER
Rx Refill Note  Requested Prescriptions     Pending Prescriptions Disp Refills    furosemide (Lasix) 40 MG tablet 30 tablet 2     Sig: Take 1 tablet by mouth Daily As Needed (scrotal edema).      Last office visit with prescribing clinician: 2/4/2025   Last telemedicine visit with prescribing clinician: Visit date not found   Next office visit with prescribing clinician: Visit date not found                         Would you like a call back once the refill request has been completed: [] Yes [] No    If the office needs to give you a call back, can they leave a voicemail: [] Yes [] No    SVITLANA Mata  05/22/25, 08:16 CDT    Medication last filled 02/04/2025, qty 30, 2 refills.

## 2025-06-11 ENCOUNTER — TELEMEDICINE (OUTPATIENT)
Dept: FAMILY MEDICINE CLINIC | Facility: TELEHEALTH | Age: 45
End: 2025-06-11
Payer: COMMERCIAL

## 2025-06-11 DIAGNOSIS — J06.9 UPPER RESPIRATORY TRACT INFECTION, UNSPECIFIED TYPE: Primary | ICD-10-CM

## 2025-06-11 PROCEDURE — 99213 OFFICE O/P EST LOW 20 MIN: CPT | Performed by: NURSE PRACTITIONER

## 2025-06-11 RX ORDER — BENZONATATE 100 MG/1
CAPSULE ORAL
Qty: 30 CAPSULE | Refills: 0 | Status: SHIPPED | OUTPATIENT
Start: 2025-06-11

## 2025-06-11 RX ORDER — PREDNISONE 20 MG/1
20 TABLET ORAL 2 TIMES DAILY
Qty: 10 TABLET | Refills: 0 | Status: SHIPPED | OUTPATIENT
Start: 2025-06-11 | End: 2025-06-16

## 2025-06-11 NOTE — PATIENT INSTRUCTIONS
Upper Respiratory Infection, Adult  An upper respiratory infection (URI) is a common viral infection of the nose, throat, and upper air passages that lead to the lungs. The most common type of URI is the common cold. URIs usually get better on their own, without medical treatment.  What are the causes?  A URI is caused by a virus. You may catch a virus by:  Breathing in droplets from an infected person's cough or sneeze.  Touching something that has been exposed to the virus (is contaminated) and then touching your mouth, nose, or eyes.  What increases the risk?  You are more likely to get a URI if:  You are very young or very old.  You have close contact with others, such as at work, school, or a health care facility.  You smoke.  You have long-term (chronic) heart or lung disease.  You have a weakened disease-fighting system (immune system).  You have nasal allergies or asthma.  You are experiencing a lot of stress.  You have poor nutrition.  What are the signs or symptoms?  A URI usually involves some of the following symptoms:  Runny or stuffy (congested) nose.  Cough.  Sneezing.  Sore throat.  Headache.  Fatigue.  Fever.  Loss of appetite.  Pain in your forehead, behind your eyes, and over your cheekbones (sinus pain).  Muscle aches.  Redness or irritation of the eyes.  Pressure in the ears or face.  How is this diagnosed?  This condition may be diagnosed based on your medical history and symptoms, and a physical exam. Your health care provider may use a swab to take a mucus sample from your nose (nasal swab). This sample can be tested to determine what virus is causing the illness.  How is this treated?  URIs usually get better on their own within 7-10 days. Medicines cannot cure URIs, but your health care provider may recommend certain medicines to help relieve symptoms, such as:  Over-the-counter cold medicines.  Cough suppressants. Coughing is a type of defense against infection that helps to clear the  respiratory system, so take these medicines only as recommended by your health care provider.  Fever-reducing medicines.  Follow these instructions at home:  Activity  Rest as needed.  If you have a fever, stay home from work or school until your fever is gone or until your health care provider says your URI cannot spread to other people (is no longer contagious). Your health care provider may have you wear a face mask to prevent your infection from spreading.  Relieving symptoms  Gargle with a mixture of salt and water 3-4 times a day or as needed. To make salt water, completely dissolve ½-1 tsp (3-6 g) of salt in 1 cup (237 mL) of warm water.  Use a cool-mist humidifier to add moisture to the air. This can help you breathe more easily.  Eating and drinking    Drink enough fluid to keep your urine pale yellow.  Eat soups and other clear broths.  General instructions    Take over-the-counter and prescription medicines only as told by your health care provider. These include cold medicines, fever reducers, and cough suppressants.  Do not use any products that contain nicotine or tobacco. These products include cigarettes, chewing tobacco, and vaping devices, such as e-cigarettes. If you need help quitting, ask your health care provider.  Stay away from secondhand smoke.  Stay up to date on all immunizations, including the yearly (annual) flu vaccine.  Keep all follow-up visits. This is important.  How to prevent the spread of infection to others  URIs can be contagious. To prevent the infection from spreading:  Wash your hands with soap and water for at least 20 seconds. If soap and water are not available, use hand .  Avoid touching your mouth, face, eyes, or nose.  Cough or sneeze into a tissue or your sleeve or elbow instead of into your hand or into the air.    Contact a health care provider if:  You are getting worse instead of better.  You have a fever or chills.  Your mucus is brown or red.  You have  yellow or brown discharge coming from your nose.  You have pain in your face, especially when you bend forward.  You have swollen neck glands.  You have pain while swallowing.  You have white areas in the back of your throat.  Get help right away if:  You have shortness of breath that gets worse.  You have severe or persistent:  Headache.  Ear pain.  Sinus pain.  Chest pain.  You have chronic lung disease along with any of the following:  Making high-pitched whistling sounds when you breathe, most often when you breathe out (wheezing).  Prolonged cough (more than 14 days).  Coughing up blood.  A change in your usual mucus.  You have a stiff neck.  You have changes in your:  Vision.  Hearing.  Thinking.  Mood.  These symptoms may be an emergency. Get help right away. Call 911.  Do not wait to see if the symptoms will go away.  Do not drive yourself to the hospital.  Summary  An upper respiratory infection (URI) is a common infection of the nose, throat, and upper air passages that lead to the lungs.  A URI is caused by a virus.  URIs usually get better on their own within 7-10 days.  Medicines cannot cure URIs, but your health care provider may recommend certain medicines to help relieve symptoms.  This information is not intended to replace advice given to you by your health care provider. Make sure you discuss any questions you have with your health care provider.  Document Revised: 07/20/2022 Document Reviewed: 07/20/2022  [employer] Patient Education © 2024 [employer]

## 2025-06-11 NOTE — PROGRESS NOTES
You have chosen to receive care through a telehealth visit.  Do you consent to use a video/audio connection for your medical care today? Yes     CHIEF COMPLAINT  No chief complaint on file.        HPI  Marlon Mccarthy is a 45 y.o. male  presents with complaint of cough. Reports started 2 days ago. Reports he has been coughing up a small amt of thick mucous. Reports it is a light greenish color. Reports no fever or chills. Nausea has vomited x 2 . Reports he has a gag reflex and the drainage is causing him to gag and vomit. Reports no CP little SOA with cough. Reports he has not taking any medication for his symptoms. Reports he has not taken a COVID test.     Review of Systems   Constitutional:  Negative for chills, fatigue and fever.   HENT:  Positive for congestion and postnasal drip. Negative for ear discharge, ear pain, sinus pressure, sinus pain and sore throat.    Respiratory:  Positive for cough and shortness of breath (mild with cough). Negative for chest tightness and wheezing.    Cardiovascular:  Negative for chest pain.   Gastrointestinal:  Positive for nausea and vomiting (gag with postnasal drainage). Negative for abdominal pain and diarrhea.   Musculoskeletal:  Negative for back pain and myalgias.   Neurological:  Negative for dizziness and headaches.   Psychiatric/Behavioral: Negative.         Past Medical History:   Diagnosis Date    Anxiety     Diverticulitis of colon     Diverticulosis     Hypertension     Non morbid obesity due to excess calories 05/16/2017    SVT (supraventricular tachycardia)        Family History   Problem Relation Age of Onset    Diabetes Mother     Hypertension Mother     Stroke Mother     Hypertension Father     No Known Problems Sister     No Known Problems Sister     No Known Problems Maternal Grandmother     No Known Problems Maternal Grandfather     No Known Problems Paternal Grandmother     No Known Problems Paternal Grandfather        Social History     Socioeconomic  History    Marital status:    Tobacco Use    Smoking status: Never     Passive exposure: Never    Smokeless tobacco: Never   Vaping Use    Vaping status: Never Used   Substance and Sexual Activity    Alcohol use: Yes     Alcohol/week: 7.0 standard drinks of alcohol     Types: 7 Cans of beer per week     Comment: social    Drug use: No    Sexual activity: Not Currently     Partners: Female     Birth control/protection: Condom       Marlon Mccarthy  reports that he has never smoked. He has never been exposed to tobacco smoke. He has never used smokeless tobacco. I have educated him on the risk of diseases from using tobacco products such as cancer, COPD, and heart disease.       I spent 1 minutes counseling the patient.              There were no vitals taken for this visit.    PHYSICAL EXAM  Physical Exam   Constitutional: He is oriented to person, place, and time. He appears well-developed and well-nourished. He does not have a sickly appearance. No distress.   HENT:   Head: Normocephalic and atraumatic.   Right Ear: Hearing normal.   Left Ear: Hearing normal.   Nose: No congestion.   Mouth/Throat: Mouth/Lips are normal.  Eyes: Conjunctivae and lids are normal.   Pulmonary/Chest: Effort normal.  No respiratory distress.  Neurological: He is alert and oriented to person, place, and time.   Psychiatric: He has a normal mood and affect. His speech is normal and behavior is normal.       Results for orders placed or performed during the hospital encounter of 05/01/25   ECG 12 Lead Tachycardia    Collection Time: 05/01/25  2:12 PM   Result Value Ref Range    QT Interval 276 ms    QTC Interval 485 ms   ECG 12 Lead Rhythm Change    Collection Time: 05/01/25  2:41 PM   Result Value Ref Range    QT Interval 362 ms    QTC Interval 487 ms   CBC Auto Differential    Collection Time: 05/01/25  2:46 PM    Specimen: Blood   Result Value Ref Range    WBC 10.41 3.40 - 10.80 10*3/mm3    RBC 5.47 4.14 - 5.80 10*6/mm3     Hemoglobin 14.9 13.0 - 17.7 g/dL    Hematocrit 47.8 37.5 - 51.0 %    MCV 87.4 79.0 - 97.0 fL    MCH 27.2 26.6 - 33.0 pg    MCHC 31.2 (L) 31.5 - 35.7 g/dL    RDW 14.6 12.3 - 15.4 %    RDW-SD 46.6 37.0 - 54.0 fl    MPV 10.4 6.0 - 12.0 fL    Platelets 242 140 - 450 10*3/mm3    Neutrophil % 68.4 42.7 - 76.0 %    Lymphocyte % 20.3 19.6 - 45.3 %    Monocyte % 6.3 5.0 - 12.0 %    Eosinophil % 3.7 0.3 - 6.2 %    Basophil % 0.8 0.0 - 1.5 %    Immature Grans % 0.5 0.0 - 0.5 %    Neutrophils, Absolute 7.12 (H) 1.70 - 7.00 10*3/mm3    Lymphocytes, Absolute 2.11 0.70 - 3.10 10*3/mm3    Monocytes, Absolute 0.66 0.10 - 0.90 10*3/mm3    Eosinophils, Absolute 0.39 0.00 - 0.40 10*3/mm3    Basophils, Absolute 0.08 0.00 - 0.20 10*3/mm3    Immature Grans, Absolute 0.05 0.00 - 0.05 10*3/mm3    nRBC 0.0 0.0 - 0.2 /100 WBC   BNP    Collection Time: 05/01/25  2:46 PM    Specimen: Blood   Result Value Ref Range    proBNP 66.1 0.0 - 450.0 pg/mL   D-dimer, Quantitative    Collection Time: 05/01/25  2:46 PM    Specimen: Blood   Result Value Ref Range    D-Dimer, Quantitative 0.63 (H) 0.00 - 0.50 MCGFEU/mL   Comprehensive Metabolic Panel    Collection Time: 05/01/25  3:26 PM    Specimen: Arm, Left; Blood   Result Value Ref Range    Glucose 99 65 - 99 mg/dL    BUN 7 6 - 20 mg/dL    Creatinine 0.61 (L) 0.76 - 1.27 mg/dL    Sodium 139 136 - 145 mmol/L    Potassium 3.9 3.5 - 5.2 mmol/L    Chloride 101 98 - 107 mmol/L    CO2 24.0 22.0 - 29.0 mmol/L    Calcium 8.7 8.6 - 10.5 mg/dL    Total Protein 8.3 6.0 - 8.5 g/dL    Albumin 3.7 3.5 - 5.2 g/dL    ALT (SGPT) 32 1 - 41 U/L    AST (SGOT) 41 (H) 1 - 40 U/L    Alkaline Phosphatase 99 39 - 117 U/L    Total Bilirubin 0.7 0.0 - 1.2 mg/dL    Globulin 4.6 gm/dL    A/G Ratio 0.8 g/dL    BUN/Creatinine Ratio 11.5 7.0 - 25.0    Anion Gap 14.0 5.0 - 15.0 mmol/L    eGFR 120.0 >60.0 mL/min/1.73   Magnesium    Collection Time: 05/01/25  3:26 PM    Specimen: Arm, Left; Blood   Result Value Ref Range    Magnesium 2.0  1.6 - 2.6 mg/dL       Diagnoses and all orders for this visit:    1. Upper respiratory tract infection, unspecified type (Primary)    Other orders  -     predniSONE (DELTASONE) 20 MG tablet; Take 1 tablet by mouth 2 (Two) Times a Day for 5 days.  Dispense: 10 tablet; Refill: 0  -     benzonatate (Tessalon Perles) 100 MG capsule; 1 or 2 perles tid prn cough  Dispense: 30 capsule; Refill: 0    Rest  Fluids  PCP if symptoms continue  ER for any worsening symptoms such as high fever, chest pain or shortness of air      FOLLOW-UP  As discussed during visit with PCP/[facility] Care if no improvement or Urgent Care/Emergency Department if worsening of symptoms    Patient verbalizes understanding of medication dosage, comfort measures, instructions for treatment and follow-up.    Allie Wade, KARLA  06/11/2025  06:00 EDT    Mode of Visit: Video  Location of patient: -HOME-  Location of provider: +HOME+  You have chosen to receive care through a telehealth visit.  The patient has signed the video visit consent form.  The visit included audio and video interaction. No technical issues occurred during this visit.      The use of a video visit has been reviewed with the patient and verbal informed consent has been obtained. Myself and Marlon Mccarthy participated in this visit. The patient is located in [address] [place] [address].   I am located in [place], [place]. [employer] and [employer] were utilized. I spent 5 minutes in the patient's chart for this visit.         Note Disclaimer: At [facility], we believe that sharing information builds trust and better   relationships. You are receiving this note because you recently visited [facility]. It is possible you   will see health information before a provider has talked with you about it. This kind of information can   be easy to misunderstand. To help you fully understand what it means for your health, we urge you to   discuss this note with your  provider.

## 2025-06-17 DIAGNOSIS — N50.89 SCROTAL EDEMA: ICD-10-CM

## 2025-06-17 RX ORDER — FUROSEMIDE 40 MG/1
40 TABLET ORAL DAILY PRN
Qty: 30 TABLET | Refills: 2 | OUTPATIENT
Start: 2025-06-17

## 2025-06-17 NOTE — TELEPHONE ENCOUNTER
Caller: Marlon Mccarthy    Relationship: Self    Best call back number: [phone]     Requested Prescriptions:   Requested Prescriptions     Pending Prescriptions Disp Refills    furosemide (Lasix) 40 MG tablet 30 tablet 2     Sig: Take 1 tablet by mouth Daily As Needed (scrotal edema).        Pharmacy where request should be sent: [id number] - [place], [place] - [address] [facility] [phone] [facility] [phone]      Last office visit with prescribing clinician: 2/4/2025   Last telemedicine visit with prescribing clinician: Visit date not found   Next office visit with prescribing clinician: 6/17/2025     Additional details provided by patient: COMPLETELY OUT    Does the patient have less than a 3 day supply:  [x] Yes  [] No    Would you like a call back once the refill request has been completed: [x] Yes [] No    If the office needs to give you a call back, can they leave a voicemail: [] Yes [] No    Donald Stevens Rep   06/17/25 08:08 CDT

## 2025-07-01 ENCOUNTER — OFFICE VISIT (OUTPATIENT)
Dept: INTERNAL MEDICINE | Facility: CLINIC | Age: 45
End: 2025-07-01
Payer: COMMERCIAL

## 2025-07-01 VITALS
HEIGHT: 77 IN | TEMPERATURE: 97.7 F | OXYGEN SATURATION: 96 % | BODY MASS INDEX: 37.19 KG/M2 | HEART RATE: 87 BPM | DIASTOLIC BLOOD PRESSURE: 88 MMHG | WEIGHT: 315 LBS | SYSTOLIC BLOOD PRESSURE: 156 MMHG

## 2025-07-01 DIAGNOSIS — I10 BENIGN ESSENTIAL HTN: Primary | ICD-10-CM

## 2025-07-01 DIAGNOSIS — N50.89 SCROTAL EDEMA: ICD-10-CM

## 2025-07-01 DIAGNOSIS — E66.01 MORBID OBESITY DUE TO EXCESS CALORIES: ICD-10-CM

## 2025-07-01 PROCEDURE — 99214 OFFICE O/P EST MOD 30 MIN: CPT | Performed by: INTERNAL MEDICINE

## 2025-07-01 RX ORDER — FUROSEMIDE 40 MG/1
40 TABLET ORAL 2 TIMES DAILY
Qty: 60 TABLET | Refills: 2 | Status: SHIPPED | OUTPATIENT
Start: 2025-07-01

## 2025-07-01 RX ORDER — LOSARTAN POTASSIUM 100 MG/1
100 TABLET ORAL DAILY
Qty: 90 TABLET | Refills: 1 | Status: SHIPPED | OUTPATIENT
Start: 2025-07-01 | End: 2025-07-01

## 2025-07-01 RX ORDER — LOSARTAN POTASSIUM 50 MG/1
50 TABLET ORAL DAILY
Qty: 90 TABLET | Refills: 1 | Status: SHIPPED | OUTPATIENT
Start: 2025-07-01

## 2025-07-01 NOTE — PROGRESS NOTES
Chief Complaint   Patient presents with    Groin Swelling         History:  Marlon Mccarthy is a 45 y.o. male who presents today for evaluation of the above problems.        Chronic Scrotal Swelling  Symptoms are: chronic.   Onset was in the past 7 days.   Symptoms occur: intermittently.  Symptoms include: rash and swollen glands.   Pertinent negative symptoms include no abdominal pain, no anorexia, no joint pain, no change in stool, no chest pain, no chills, no congestion, no cough, no diaphoresis, no fatigue, no fever, no headaches, no joint swelling, no myalgias, no nausea, no neck pain, no numbness, no sore throat, no dysuria, no vertigo, no visual change, no vomiting and no weakness.   Treatment and/or Medications comments include: Lasix     History of Present Illness  The patient presents for evaluation of scrotal swelling.    He reports a gradual increase in the size of his scrotum, which has begun to interfere with daily activities such as dressing, showering, and working. He describes a waddling gait due to the girth of his scrotum, particularly on days when the swelling is more pronounced. He also experiences skin irritation similar to sunburn when the swelling subsides slightly, resulting in flaky skin. Hygiene has been affected, and using the restroom has become challenging. He is currently on a daily dose of 40 mg Lasix but does not feel it is effective as he does not experience an urge to urinate within 30 minutes to an hour after taking the medication. He has been monitoring his fluid intake and notes that even after consuming a gallon of water, he only urinates once at work and three times at home. On a typical day, he does not feel the need to urinate. His urologist had planned to discuss the possibility of a stronger diuretic or increased dosage with this provider. He is considering taking a multivitamin to prevent dehydration and other complications.    He has been prescribed Zepbound but was  unable to obtain it due to insurance issues. He has consulted a dietitian and another physician who provided access to his medical records. He acknowledges his higher body weight and has attempted to lose weight through calorie counting apps and mindful eating but struggles to maintain these habits. He reports using food as a source of comfort and often snacks while watching TV or movies. He is interested in exploring the use of compression garments for his scrotum.    His blood pressure was elevated this morning, which he attributes to lack of sleep the previous night.      ROS:  Review of Systems   Constitutional:  Negative for chills, diaphoresis, fatigue and fever.   HENT:  Negative for congestion and sore throat.    Respiratory:  Negative for cough.    Cardiovascular:  Negative for chest pain.   Gastrointestinal:  Negative for abdominal pain, anorexia, nausea and vomiting.   Genitourinary:  Negative for dysuria.   Musculoskeletal:  Negative for joint pain, myalgias and neck pain.   Skin:  Positive for rash.   Neurological:  Negative for vertigo, weakness, numbness and headaches.     See above    Current Outpatient Medications:     furosemide (Lasix) 40 MG tablet, Take 1 tablet by mouth 2 (Two) Times a Day., Disp: 60 tablet, Rfl: 2    hydrocortisone-bacitracin-zinc oxide-nystatin (MAGIC BARRIER), Apply 1 Application topically to the appropriate area as directed 2 (Two) Times a Day As Needed (rash)., Disp: 400 g, Rfl: 5    losartan (COZAAR) 50 MG tablet, Take 1 tablet by mouth Daily., Disp: 90 tablet, Rfl: 1    carvedilol (COREG) 25 MG tablet, Take 1 tablet by mouth 2 (Two) Times a Day With Meals., Disp: 180 tablet, Rfl: 1    Lab Results   Component Value Date    GLUCOSE 99 05/01/2025    BUN 7 05/01/2025    CREATININE 0.61 (L) 05/01/2025     05/01/2025    K 3.9 05/01/2025     05/01/2025    CALCIUM 8.7 05/01/2025    PROTEINTOT 8.3 05/01/2025    ALBUMIN 3.7 05/01/2025    ALT 32 05/01/2025    AST 41 (H)  05/01/2025    ALKPHOS 99 05/01/2025    BILITOT 0.7 05/01/2025    GLOB 4.6 05/01/2025    AGRATIO 0.8 05/01/2025    BCR 11.5 05/01/2025    ANIONGAP 14.0 05/01/2025    EGFR 120.0 05/01/2025       WBC   Date Value Ref Range Status   05/01/2025 10.41 3.40 - 10.80 10*3/mm3 Final   07/31/2023 10.25 3.40 - 10.80 10*3/mm3 Final     RBC   Date Value Ref Range Status   05/01/2025 5.47 4.14 - 5.80 10*6/mm3 Final   07/31/2023 5.69 4.14 - 5.80 10*6/mm3 Final     Hemoglobin   Date Value Ref Range Status   05/01/2025 14.9 13.0 - 17.7 g/dL Final     Hematocrit   Date Value Ref Range Status   05/01/2025 47.8 37.5 - 51.0 % Final     MCV   Date Value Ref Range Status   05/01/2025 87.4 79.0 - 97.0 fL Final     MCH   Date Value Ref Range Status   05/01/2025 27.2 26.6 - 33.0 pg Final     MCHC   Date Value Ref Range Status   05/01/2025 31.2 (L) 31.5 - 35.7 g/dL Final     RDW   Date Value Ref Range Status   05/01/2025 14.6 12.3 - 15.4 % Final     RDW-SD   Date Value Ref Range Status   05/01/2025 46.6 37.0 - 54.0 fl Final     MPV   Date Value Ref Range Status   05/01/2025 10.4 6.0 - 12.0 fL Final     Platelets   Date Value Ref Range Status   05/01/2025 242 140 - 450 10*3/mm3 Final     Neutrophil %   Date Value Ref Range Status   05/01/2025 68.4 42.7 - 76.0 % Final     Lymphocyte %   Date Value Ref Range Status   05/01/2025 20.3 19.6 - 45.3 % Final     Monocyte %   Date Value Ref Range Status   05/01/2025 6.3 5.0 - 12.0 % Final     Eosinophil %   Date Value Ref Range Status   05/01/2025 3.7 0.3 - 6.2 % Final     Basophil %   Date Value Ref Range Status   05/01/2025 0.8 0.0 - 1.5 % Final     Immature Grans %   Date Value Ref Range Status   05/01/2025 0.5 0.0 - 0.5 % Final     Neutrophils, Absolute   Date Value Ref Range Status   05/01/2025 7.12 (H) 1.70 - 7.00 10*3/mm3 Final     Lymphocytes, Absolute   Date Value Ref Range Status   05/01/2025 2.11 0.70 - 3.10 10*3/mm3 Final     Monocytes, Absolute   Date Value Ref Range Status   05/01/2025  "0.66 0.10 - 0.90 10*3/mm3 Final     Eosinophils, Absolute   Date Value Ref Range Status   05/01/2025 0.39 0.00 - 0.40 10*3/mm3 Final     Basophils, Absolute   Date Value Ref Range Status   05/01/2025 0.08 0.00 - 0.20 10*3/mm3 Final     Immature Grans, Absolute   Date Value Ref Range Status   05/01/2025 0.05 0.00 - 0.05 10*3/mm3 Final     nRBC   Date Value Ref Range Status   05/01/2025 0.0 0.0 - 0.2 /100 WBC Final         OBJECTIVE:  Visit Vitals  /88 (BP Location: Left arm, Patient Position: Sitting, Cuff Size: Large Adult)   Pulse 87   Temp 97.7 °F (36.5 °C) (Temporal)   Ht 195.6 cm (77\")   Wt (!) 256 kg (564 lb 9.6 oz)   SpO2 96%   BMI 66.95 kg/m²      Physical Exam  Vitals and nursing note reviewed.   Constitutional:       General: He is not in acute distress.     Appearance: Normal appearance. He is well-developed. He is obese. He is not ill-appearing or toxic-appearing.   HENT:      Head: Normocephalic and atraumatic.   Eyes:      Pupils: Pupils are equal, round, and reactive to light.   Neck:      Thyroid: No thyromegaly.      Trachea: Phonation normal.   Cardiovascular:      Rate and Rhythm: Normal rate.   Pulmonary:      Effort: No respiratory distress.   Skin:     Coloration: Skin is not pale.      Findings: No erythema.   Neurological:      Mental Status: He is alert.   Psychiatric:         Behavior: Behavior normal.         Thought Content: Thought content normal.         Judgment: Judgment normal.           Assessment/Plan      Diagnoses and all orders for this visit:    1. Benign essential HTN (Primary)  -     Basic metabolic panel; Future  -     Discontinue: losartan (COZAAR) 100 MG tablet; Take 1 tablet by mouth Daily.  Dispense: 90 tablet; Refill: 1  -     losartan (COZAAR) 50 MG tablet; Take 1 tablet by mouth Daily.  Dispense: 90 tablet; Refill: 1    2. Scrotal edema  -     Basic metabolic panel; Future  -     furosemide (Lasix) 40 MG tablet; Take 1 tablet by mouth 2 (Two) Times a Day.  " Dispense: 60 tablet; Refill: 2    3. Morbid obesity due to excess calories      Assessment & Plan  1. Scrotal swelling.  - The patient's scrotal swelling is likely due to significant his severe morbid obesity.  - Physical exam findings previously indicated the scrotal skin is tight and firm, suggesting fluid retention similar to lymphedema.  Not evaluated today.  - Discussed the importance of weight loss as the primary treatment, aiming for a gradual loss of 5 pounds per month or a more aggressive approach of 7-10 pounds per month initially.  - Increased Lasix dosage to 40 mg twice daily; blood test ordered in 2 weeks to monitor kidney function and potassium levels.    2. Hypertension.  - The patient's blood pressure is high, attributed to lack of sleep and stress related to scrotal swelling.  - Discussed the impact of stress and poor sleep on blood pressure control.  - Encouraged weight loss and stress management as part of the treatment plan.  - Continue monitoring home blood pressure readings.  - Consider increasing losartan to 100 mg daily if blood pressure still high at the next visit.    Follow-up  - The patient will follow up in 4 weeks.      Return in about 4 weeks (around 7/29/2025) for Recheck.      RAFAEL Sawyer MD  08:39 CDT  7/1/2025   Electronically signed    Patient or patient representative verbalized consent for the use of Ambient Listening during the visit with  BROOKE Sawyer MD for chart documentation. 7/1/2025  08:55 CDT

## 2025-07-11 ENCOUNTER — DOCUMENTATION (OUTPATIENT)
Dept: CARDIOLOGY | Facility: CLINIC | Age: 45
End: 2025-07-11
Payer: COMMERCIAL

## 2025-07-11 ENCOUNTER — HOSPITAL ENCOUNTER (EMERGENCY)
Facility: HOSPITAL | Age: 45
Discharge: HOME | End: 2025-07-11
Attending: EMERGENCY MEDICINE
Payer: COMMERCIAL

## 2025-07-11 VITALS
DIASTOLIC BLOOD PRESSURE: 83 MMHG | TEMPERATURE: 98.2 F | BODY MASS INDEX: 37.19 KG/M2 | SYSTOLIC BLOOD PRESSURE: 163 MMHG | OXYGEN SATURATION: 94 % | WEIGHT: 315 LBS | HEART RATE: 85 BPM | RESPIRATION RATE: 17 BRPM | HEIGHT: 77 IN

## 2025-07-11 VITALS
TEMPERATURE: 98.4 F | DIASTOLIC BLOOD PRESSURE: 152 MMHG | RESPIRATION RATE: 24 BRPM | OXYGEN SATURATION: 91 % | BODY MASS INDEX: 37.19 KG/M2 | WEIGHT: 315 LBS | SYSTOLIC BLOOD PRESSURE: 171 MMHG | HEART RATE: 16 BPM | HEIGHT: 77 IN

## 2025-07-11 DIAGNOSIS — I47.10 SVT (SUPRAVENTRICULAR TACHYCARDIA): Primary | ICD-10-CM

## 2025-07-11 DIAGNOSIS — I47.10 PAROXYSMAL SUPRAVENTRICULAR TACHYCARDIA: Primary | ICD-10-CM

## 2025-07-11 LAB
ALBUMIN SERPL-MCNC: 3.6 G/DL (ref 3.5–5.2)
ALBUMIN/GLOB SERPL: 0.8 G/DL
ALP SERPL-CCNC: 108 U/L (ref 39–117)
ALT SERPL W P-5'-P-CCNC: 51 U/L (ref 1–41)
ANION GAP SERPL CALCULATED.3IONS-SCNC: 14 MMOL/L (ref 5–15)
AST SERPL-CCNC: 66 U/L (ref 1–40)
BASOPHILS # BLD AUTO: 0.07 10*3/MM3 (ref 0–0.2)
BASOPHILS NFR BLD AUTO: 0.7 % (ref 0–1.5)
BILIRUB SERPL-MCNC: 0.4 MG/DL (ref 0–1.2)
BUN SERPL-MCNC: 10.4 MG/DL (ref 6–20)
BUN/CREAT SERPL: 14.4 (ref 7–25)
CALCIUM SPEC-SCNC: 9.2 MG/DL (ref 8.6–10.5)
CHLORIDE SERPL-SCNC: 100 MMOL/L (ref 98–107)
CO2 SERPL-SCNC: 25 MMOL/L (ref 22–29)
CREAT SERPL-MCNC: 0.72 MG/DL (ref 0.76–1.27)
DEPRECATED RDW RBC AUTO: 45.6 FL (ref 37–54)
EGFRCR SERPLBLD CKD-EPI 2021: 114.1 ML/MIN/1.73
EOSINOPHIL # BLD AUTO: 0.28 10*3/MM3 (ref 0–0.4)
EOSINOPHIL NFR BLD AUTO: 2.8 % (ref 0.3–6.2)
ERYTHROCYTE [DISTWIDTH] IN BLOOD BY AUTOMATED COUNT: 14.5 % (ref 12.3–15.4)
GLOBULIN UR ELPH-MCNC: 4.8 GM/DL
GLUCOSE SERPL-MCNC: 137 MG/DL (ref 65–99)
HCT VFR BLD AUTO: 49.9 % (ref 37.5–51)
HGB BLD-MCNC: 15.8 G/DL (ref 13–17.7)
IMM GRANULOCYTES # BLD AUTO: 0.03 10*3/MM3 (ref 0–0.05)
IMM GRANULOCYTES NFR BLD AUTO: 0.3 % (ref 0–0.5)
LYMPHOCYTES # BLD AUTO: 2.77 10*3/MM3 (ref 0.7–3.1)
LYMPHOCYTES NFR BLD AUTO: 27.7 % (ref 19.6–45.3)
MAGNESIUM SERPL-MCNC: 1.9 MG/DL (ref 1.6–2.6)
MCH RBC QN AUTO: 27.5 PG (ref 26.6–33)
MCHC RBC AUTO-ENTMCNC: 31.7 G/DL (ref 31.5–35.7)
MCV RBC AUTO: 86.9 FL (ref 79–97)
MONOCYTES # BLD AUTO: 0.88 10*3/MM3 (ref 0.1–0.9)
MONOCYTES NFR BLD AUTO: 8.8 % (ref 5–12)
NEUTROPHILS NFR BLD AUTO: 5.97 10*3/MM3 (ref 1.7–7)
NEUTROPHILS NFR BLD AUTO: 59.7 % (ref 42.7–76)
NRBC BLD AUTO-RTO: 0 /100 WBC (ref 0–0.2)
PLATELET # BLD AUTO: 229 10*3/MM3 (ref 140–450)
PMV BLD AUTO: 10.5 FL (ref 6–12)
POTASSIUM SERPL-SCNC: 3.9 MMOL/L (ref 3.5–5.2)
PROT SERPL-MCNC: 8.4 G/DL (ref 6–8.5)
QT INTERVAL: 242 MS
QT INTERVAL: 374 MS
QTC INTERVAL: 432 MS
QTC INTERVAL: 479 MS
RBC # BLD AUTO: 5.74 10*6/MM3 (ref 4.14–5.8)
SODIUM SERPL-SCNC: 139 MMOL/L (ref 136–145)
TSH SERPL DL<=0.05 MIU/L-ACNC: 1.11 UIU/ML (ref 0.27–4.2)
WBC NRBC COR # BLD AUTO: 10 10*3/MM3 (ref 3.4–10.8)

## 2025-07-11 PROCEDURE — 93005 ELECTROCARDIOGRAM TRACING: CPT | Performed by: EMERGENCY MEDICINE

## 2025-07-11 PROCEDURE — 96374 THER/PROPH/DIAG INJ IV PUSH: CPT

## 2025-07-11 PROCEDURE — 93010 ELECTROCARDIOGRAM REPORT: CPT | Performed by: INTERNAL MEDICINE

## 2025-07-11 PROCEDURE — 25010000002 DILTIAZEM 25 MG/5ML SOLUTION: Performed by: EMERGENCY MEDICINE

## 2025-07-11 PROCEDURE — 99283 EMERGENCY DEPT VISIT LOW MDM: CPT | Performed by: EMERGENCY MEDICINE

## 2025-07-11 PROCEDURE — 80050 GENERAL HEALTH PANEL: CPT | Performed by: EMERGENCY MEDICINE

## 2025-07-11 PROCEDURE — 83735 ASSAY OF MAGNESIUM: CPT | Performed by: EMERGENCY MEDICINE

## 2025-07-11 PROCEDURE — 25810000003 SODIUM CHLORIDE 0.9 % SOLUTION: Performed by: EMERGENCY MEDICINE

## 2025-07-11 RX ORDER — DILTIAZEM HYDROCHLORIDE 5 MG/ML
30 INJECTION INTRAVENOUS ONCE
Status: COMPLETED | OUTPATIENT
Start: 2025-07-11 | End: 2025-07-11

## 2025-07-11 RX ORDER — DILTIAZEM HYDROCHLORIDE 5 MG/ML
25 INJECTION INTRAVENOUS ONCE
Status: COMPLETED | OUTPATIENT
Start: 2025-07-11 | End: 2025-07-11

## 2025-07-11 RX ORDER — SODIUM CHLORIDE 0.9 % (FLUSH) 0.9 %
10 SYRINGE (ML) INJECTION AS NEEDED
Status: DISCONTINUED | OUTPATIENT
Start: 2025-07-11 | End: 2025-07-11 | Stop reason: HOSPADM

## 2025-07-11 RX ADMIN — DILTIAZEM HYDROCHLORIDE 25 MG: 5 INJECTION, SOLUTION INTRAVENOUS at 08:51

## 2025-07-11 RX ADMIN — SODIUM CHLORIDE 1000 ML: 9 INJECTION, SOLUTION INTRAVENOUS at 05:48

## 2025-07-11 RX ADMIN — DILTIAZEM HYDROCHLORIDE 30 MG: 5 INJECTION, SOLUTION INTRAVENOUS at 05:42

## 2025-07-11 NOTE — ED PROVIDER NOTES
Subjective   History of Present Illness  Patient is a 45-year-old who came to the ED with palpitation patient just was discharged from the ER with SVT.  He does not see a EP because he thinks he has 2 few episodes associated electrophysiologist.  He is back in the ER with sinus tachycardia refusing appropriate algorithm driven treatment is dictating the medication that can be used to control his SVT    Palpitations  Palpitations quality:  Regular  Onset quality:  Sudden  Timing:  Constant  Chronicity:  Recurrent  Context: not anxiety, not appetite suppressants, not blood loss, not dehydration, not exercise and not nicotine    Relieved by:  Nothing  Worsened by:  Nothing  Ineffective treatments:  None tried  Associated symptoms: no back pain, no chest pain, no chest pressure, no cough, no diaphoresis, no dizziness, no malaise/fatigue, no nausea, no near-syncope, no shortness of breath, no syncope and no vomiting    Risk factors: no diabetes mellitus, no heart disease and no hx of thyroid disease        Review of Systems   Constitutional: Negative.  Negative for chills, diaphoresis, fatigue, fever and malaise/fatigue.   HENT: Negative.  Negative for congestion.    Respiratory: Negative.  Negative for cough, chest tightness, shortness of breath and stridor.    Cardiovascular:  Positive for palpitations. Negative for chest pain, syncope and near-syncope.   Gastrointestinal: Negative.  Negative for abdominal distention, abdominal pain, nausea and vomiting.   Endocrine: Negative.    Genitourinary: Negative.  Negative for difficulty urinating and flank pain.   Musculoskeletal: Negative.  Negative for back pain.   Skin: Negative.  Negative for color change.   Neurological: Negative.  Negative for dizziness and headaches.   All other systems reviewed and are negative.      Past Medical History:   Diagnosis Date    Anxiety     Diverticulitis of colon     Diverticulosis     Hypertension     Non morbid obesity due to excess  calories 05/16/2017    SVT (supraventricular tachycardia)        Allergies   Allergen Reactions    Lisinopril Cough       Past Surgical History:   Procedure Laterality Date    COLONOSCOPY      HERNIA REPAIR      INGUINAL HERNIA REPAIR         Family History   Problem Relation Age of Onset    Diabetes Mother     Hypertension Mother     Stroke Mother     Hypertension Father     No Known Problems Sister     No Known Problems Sister     No Known Problems Maternal Grandmother     No Known Problems Maternal Grandfather     No Known Problems Paternal Grandmother     No Known Problems Paternal Grandfather        Social History     Socioeconomic History    Marital status:    Tobacco Use    Smoking status: Never     Passive exposure: Never    Smokeless tobacco: Never   Vaping Use    Vaping status: Never Used   Substance and Sexual Activity    Alcohol use: Yes     Alcohol/week: 7.0 standard drinks of alcohol     Types: 7 Cans of beer per week     Comment: social    Drug use: No    Sexual activity: Not Currently     Partners: Female     Birth control/protection: Condom           Objective   Physical Exam  Vitals and nursing note reviewed. Exam conducted with a chaperone present.   Constitutional:       General: He is not in acute distress.     Appearance: Normal appearance. He is well-developed. He is morbidly obese. He is not toxic-appearing.   HENT:      Head: Normocephalic and atraumatic.      Nose: Nose normal.      Mouth/Throat:      Mouth: Mucous membranes are moist.      Pharynx: Uvula midline.   Eyes:      General: Lids are normal. Lids are everted, no foreign bodies appreciated.      Conjunctiva/sclera: Conjunctivae normal.      Pupils: Pupils are equal, round, and reactive to light.   Neck:      Vascular: Normal carotid pulses. No carotid bruit or JVD.      Trachea: Trachea and phonation normal. No tracheal deviation.   Cardiovascular:      Rate and Rhythm: Regular rhythm. Tachycardia present.      Chest Wall:  PMI is not displaced.      Pulses: Normal pulses.      Heart sounds: Normal heart sounds.      No gallop.   Pulmonary:      Effort: Pulmonary effort is normal. No tachypnea, accessory muscle usage or respiratory distress.      Breath sounds: Normal breath sounds. No stridor. No decreased breath sounds, wheezing, rhonchi or rales.   Abdominal:      General: Bowel sounds are normal. There is no distension.      Palpations: Abdomen is soft.      Tenderness: There is no abdominal tenderness.   Musculoskeletal:         General: No swelling. Normal range of motion.      Cervical back: Full passive range of motion without pain, normal range of motion and neck supple. No rigidity.      Comments: Lower extremity exam bilaterally is unremarkable.  There is no right or left calf tenderness .  There is no palpable venous cord.  No obvious difference in the size of the legs.  No pitting edema.  The dorsalis pedis and posterior tibial femoral and popliteal pulses are palpable and +2 bilaterally.  Homans sign is negative   Skin:     General: Skin is warm and dry.      Capillary Refill: Capillary refill takes less than 2 seconds.      Coloration: Skin is not jaundiced or pale.      Nails: There is no clubbing.   Neurological:      General: No focal deficit present.      Mental Status: He is alert and oriented to person, place, and time.      GCS: GCS eye subscore is 4. GCS verbal subscore is 5. GCS motor subscore is 6.      Cranial Nerves: Cranial nerves 2-12 are intact. No cranial nerve deficit or facial asymmetry.      Motor: Motor function is intact. No weakness.      Gait: Gait normal.   Psychiatric:         Speech: Speech normal.         Behavior: Behavior normal.         Chemical Cardioversion    Date/Time: 7/11/2025 10:08 AM    Performed by: Palmer Watkins MD  Authorized by: Palmer Watkins MD    Consent:     Consent obtained:  Emergent situation    Consent given by:  Patient    Risks discussed:  Death, induced arrhythmia and  pain    Alternatives discussed:  Electrical cardioversion  Pre-procedure details:     Cardioversion basis:  Emergent    Rhythm:  Supraventricular tachycardia  Attempt one:     : Cardizem.      Medication outcome:  Conversion to normal sinus rhythm: Cardizem to 25 mg.  Post-procedure details:     Patient status:  Awake    Patient tolerance of procedure:  Tolerated well, no immediate complications             ED Course  ED Course as of 07/11/25 1622   Fri Jul 11, 2025   1006 His lab workup from earlier in the morning were reviewed patient received diltiazem 25 mg IV and the EKG shows normal sinus rhythm at this time.  No chest pain or shortness of breath.  Patient wants to go home have recommended follow-up with electrophysiology as an outpatient and to return here for any worsening symptoms.  Patient will be discharged home. [TS]   1014 Called EP awaiting the call back the patient wants to go home we will discharge him home with recommendation to follow-up with EP [TS]      ED Course User Index  [TS] Palmer Watkins MD                                                       Medical Decision Making  Patient with supraventricular tachycardia no chest pain or shortness of breath.  Morbidly obese is on medications for that.  Cardizem has improved the symptoms.  The patient's case and we discussed the patient he wants to go home my recommendation is to follow-up electrophysiologist as an outpatient for ablation the patient states that these are not too frequent as far he is not wanting an ablation to be performed.    Problems Addressed:  Paroxysmal supraventricular tachycardia: chronic illness or injury with exacerbation, progression, or side effects of treatment    Amount and/or Complexity of Data Reviewed  ECG/medicine tests: ordered.     Details: SVT converted to normal sinus rhythm  Discussion of management or test interpretation with external provider(s): Patient no chest pain no shortness of breath no fevers and we  discharged home with a follow-up with the primary MD.    Risk  Prescription drug management.        Final diagnoses:   Paroxysmal supraventricular tachycardia       ED Disposition  ED Disposition       ED Disposition   Discharge    Condition   Stable    Comment   Pt verbalized d/c instructions               BROOKE Sawyer MD  [address] [facility] [address]  [address]  [facility] [address]  [phone]    Schedule an appointment as soon as possible for a visit       Raj Valdez MD  [address] [facility] [address]  [place] [id number]  [phone]    Schedule an appointment as soon as possible for a visit            Medication List      No changes were made to your prescriptions during this visit.            Palmer Watkins MD  07/11/25 0821       Palmer Watkins MD  07/11/25 1009       Palmer Watkins MD  07/11/25 0884

## 2025-07-11 NOTE — PROGRESS NOTES
Presented to the ER today with symptomatic SVT.  Does have a known history of SVT in the past.  Will arrange for EP follow-up.

## 2025-07-11 NOTE — ED PROVIDER NOTES
Subjective   History of Present Illness  Pt presents to the ED with report of rapid heart rate - onset this morning.  + hx of SVT.  Has had a little SOB - states he feels this when his SVT is acting up.  No n/v/f/c.  No new foods/meds.  No chest pain.          Review of Systems   Constitutional:  Negative for fever.   Respiratory:  Positive for shortness of breath.    Cardiovascular:  Positive for palpitations and leg swelling. Negative for chest pain.   Gastrointestinal:  Negative for diarrhea, nausea and vomiting.   Genitourinary:  Negative for dysuria.   Neurological:  Negative for dizziness and light-headedness.   All other systems reviewed and are negative.      Past Medical History:   Diagnosis Date    Anxiety     Diverticulitis of colon     Diverticulosis     Hypertension     Non morbid obesity due to excess calories 05/16/2017    SVT (supraventricular tachycardia)        Allergies   Allergen Reactions    Lisinopril Cough       Past Surgical History:   Procedure Laterality Date    COLONOSCOPY      HERNIA REPAIR      INGUINAL HERNIA REPAIR         Family History   Problem Relation Age of Onset    Diabetes Mother     Hypertension Mother     Stroke Mother     Hypertension Father     No Known Problems Sister     No Known Problems Sister     No Known Problems Maternal Grandmother     No Known Problems Maternal Grandfather     No Known Problems Paternal Grandmother     No Known Problems Paternal Grandfather        Social History     Socioeconomic History    Marital status:    Tobacco Use    Smoking status: Never     Passive exposure: Never    Smokeless tobacco: Never   Vaping Use    Vaping status: Never Used   Substance and Sexual Activity    Alcohol use: Yes     Alcohol/week: 7.0 standard drinks of alcohol     Types: 7 Cans of beer per week     Comment: social    Drug use: No    Sexual activity: Not Currently     Partners: Female     Birth control/protection: Condom           Objective   Physical  Exam  Vitals and nursing note reviewed.   Constitutional:       General: He is not in acute distress.     Appearance: He is obese.   HENT:      Head: Normocephalic and atraumatic.      Mouth/Throat:      Mouth: Mucous membranes are moist.   Eyes:      Conjunctiva/sclera: Conjunctivae normal.   Cardiovascular:      Rate and Rhythm: Regular rhythm. Tachycardia present.      Pulses: Normal pulses.      Heart sounds: Normal heart sounds.   Pulmonary:      Effort: Pulmonary effort is normal.      Breath sounds: Normal breath sounds.   Abdominal:      General: Abdomen is flat.      Palpations: Abdomen is soft.   Musculoskeletal:      Right lower leg: Edema present.      Left lower leg: Edema present.   Skin:     General: Skin is warm and dry.      Capillary Refill: Capillary refill takes less than 2 seconds.      Comments: + venous stasis changes bilat LE   Neurological:      Mental Status: He is alert.         Procedures           ED Course                                                       Medical Decision Making  Pt stable in ED - feeling much better.  SVT aborted after 30mg IV cardizem.  He was given IVFs.  Pt did not wish to have adenosine as he has had that before, therefore cardizem was given 1st line.  He reports he feels ready for discharge - did not wish to stay for lab results.  Will d/c to home - recommend outpt f/u    Amount and/or Complexity of Data Reviewed  Labs: ordered.  ECG/medicine tests: ordered.    Risk  Prescription drug management.        Final diagnoses:   SVT (supraventricular tachycardia)       ED Disposition  ED Disposition       ED Disposition   Discharge    Condition   Stable    Comment   --               BROOKE Sawyer MD  [address] [place] [address]  [address] [place], [place] [id number]  [phone]               Medication List      No changes were made to your prescriptions during this visit.            Brett Donovan DO  07/11/25 0522       Brett Donovan,  DO  07/11/25 0604

## 2025-07-14 LAB
QT INTERVAL: 260 MS
QT INTERVAL: 378 MS
QTC INTERVAL: 446 MS
QTC INTERVAL: 467 MS

## 2025-07-21 ENCOUNTER — APPOINTMENT (OUTPATIENT)
Dept: GENERAL RADIOLOGY | Facility: HOSPITAL | Age: 45
End: 2025-07-21
Payer: COMMERCIAL

## 2025-07-21 ENCOUNTER — HOSPITAL ENCOUNTER (EMERGENCY)
Facility: HOSPITAL | Age: 45
Discharge: HOME | End: 2025-07-21
Attending: FAMILY MEDICINE | Admitting: FAMILY MEDICINE
Payer: COMMERCIAL

## 2025-07-21 VITALS
BODY MASS INDEX: 37.19 KG/M2 | HEART RATE: 91 BPM | OXYGEN SATURATION: 94 % | TEMPERATURE: 98 F | WEIGHT: 315 LBS | SYSTOLIC BLOOD PRESSURE: 135 MMHG | RESPIRATION RATE: 18 BRPM | HEIGHT: 77 IN | DIASTOLIC BLOOD PRESSURE: 83 MMHG

## 2025-07-21 DIAGNOSIS — I47.10 SVT (SUPRAVENTRICULAR TACHYCARDIA): Primary | ICD-10-CM

## 2025-07-21 PROCEDURE — 25010000002 DILTIAZEM 25 MG/5ML SOLUTION: Performed by: FAMILY MEDICINE

## 2025-07-21 PROCEDURE — 99283 EMERGENCY DEPT VISIT LOW MDM: CPT | Performed by: FAMILY MEDICINE

## 2025-07-21 PROCEDURE — 93005 ELECTROCARDIOGRAM TRACING: CPT

## 2025-07-21 PROCEDURE — 93005 ELECTROCARDIOGRAM TRACING: CPT | Performed by: FAMILY MEDICINE

## 2025-07-21 PROCEDURE — 96374 THER/PROPH/DIAG INJ IV PUSH: CPT

## 2025-07-21 RX ORDER — DILTIAZEM HYDROCHLORIDE 5 MG/ML
25 INJECTION INTRAVENOUS ONCE
Status: COMPLETED | OUTPATIENT
Start: 2025-07-21 | End: 2025-07-21

## 2025-07-21 RX ADMIN — DILTIAZEM HYDROCHLORIDE 25 MG: 5 INJECTION, SOLUTION INTRAVENOUS at 16:28

## 2025-07-21 NOTE — ED PROVIDER NOTES
Subjective   History of Present Illness  45-year-old male states that he felt like his heart was racing.  [place] like he was in SVT.  Has a history of SVT.  He states that Cardizem is what works for his SVT.  Patient denies any other symptoms at this time.  No chest pain.  No shortness of breath.  No headache or dizziness.      Review of Systems   All other systems reviewed and are negative.      Past Medical History:   Diagnosis Date    Anxiety     Diverticulitis of colon     Diverticulosis     Hypertension     Non morbid obesity due to excess calories 05/16/2017    SVT (supraventricular tachycardia)        Allergies   Allergen Reactions    Lisinopril Cough       Past Surgical History:   Procedure Laterality Date    COLONOSCOPY      HERNIA REPAIR      INGUINAL HERNIA REPAIR         Family History   Problem Relation Age of Onset    Diabetes Mother     Hypertension Mother     Stroke Mother     Hypertension Father     No Known Problems Sister     No Known Problems Sister     No Known Problems Maternal Grandmother     No Known Problems Maternal Grandfather     No Known Problems Paternal Grandmother     No Known Problems Paternal Grandfather        Social History     Socioeconomic History    Marital status:    Tobacco Use    Smoking status: Never     Passive exposure: Never    Smokeless tobacco: Never   Vaping Use    Vaping status: Never Used   Substance and Sexual Activity    Alcohol use: Yes     Alcohol/week: 7.0 standard drinks of alcohol     Types: 7 Cans of beer per week     Comment: social    Drug use: No    Sexual activity: Not Currently     Partners: Female     Birth control/protection: Condom           Objective   Physical Exam  Vitals and nursing note reviewed.   Constitutional:       Appearance: Normal appearance.   HENT:      Head: Normocephalic and atraumatic.      Mouth/Throat:      Mouth: Mucous membranes are moist.   Eyes:      Extraocular Movements: Extraocular movements intact.      Pupils: Pupils  are equal, round, and reactive to light.   Cardiovascular:      Rate and Rhythm: Tachycardia present. Rhythm irregular.      Heart sounds: Normal heart sounds.   Pulmonary:      Effort: Pulmonary effort is normal.      Breath sounds: Normal breath sounds.   Abdominal:      General: Bowel sounds are normal.      Palpations: Abdomen is soft.      Tenderness: There is no abdominal tenderness.   Skin:     General: Skin is warm and dry.   Neurological:      General: No focal deficit present.      Mental Status: He is alert and oriented to person, place, and time.   Psychiatric:         Mood and Affect: Mood normal.         Behavior: Behavior normal.         Procedures           ED Course  ED Course as of 07/21/25 1748   Mon Jul 21, 2025   1645 EKG rate 190  SVT [RP]   1646 EKG rate 102  Sinus tachycardia [RP]      ED Course User Index  [RP] Rolo Cifuentes MD                                                     Medications   dilTIAZem (CARDIZEM) injection 25 mg (25 mg Intravenous Given 7/21/25 1628)       Medical Decision Making  45-year-old male found to be in SVT.  He was given IV Cardizem.  Heart rate did improve.  He was observed and remained stable.  He did not want any blood work done.  He did not want any diagnostic imaging done.  Patient will be discharged home in stable condition.  All questions were answered with patient prior to discharge.  Patient was advised to follow-up with primary care provider and his cardiologist.  Patient was advised return to the emergency room with new or worsening symptoms.  Patient verbalized understanding was agreeable plan as discussed.    Problems Addressed:  SVT (supraventricular tachycardia): complicated acute illness or injury    Amount and/or Complexity of Data Reviewed  External Data Reviewed: ECG and notes.  ECG/medicine tests: ordered. Decision-making details documented in ED Course.    Risk  Prescription drug management.        Final diagnoses:   SVT (supraventricular  tachycardia)       ED Disposition  ED Disposition       ED Disposition   Discharge    Condition   Stable    Comment   --               BROOKE Sawyer MD  [address] [facility] [address]  [address]  [facility] [address] [phone]    Schedule an appointment as soon as possible for a visit       [facility] EMERGENCY DEPARTMENT  [address] [address] [phone]    As needed, If symptoms worsen         Medication List      No changes were made to your prescriptions during this visit.            Rolo Cifuentes MD  07/21/25 9851

## 2025-07-22 LAB
QT INTERVAL: 244 MS
QT INTERVAL: 368 MS
QTC INTERVAL: 433 MS
QTC INTERVAL: 479 MS

## 2025-08-12 ENCOUNTER — TELEPHONE (OUTPATIENT)
Dept: INTERNAL MEDICINE | Facility: CLINIC | Age: 45
End: 2025-08-12